# Patient Record
Sex: MALE | Race: WHITE | Employment: FULL TIME | URBAN - METROPOLITAN AREA
[De-identification: names, ages, dates, MRNs, and addresses within clinical notes are randomized per-mention and may not be internally consistent; named-entity substitution may affect disease eponyms.]

---

## 2017-12-12 ENCOUNTER — CLINICAL DOCUMENTATION (OUTPATIENT)
Dept: PHARMACY | Facility: CLINIC | Age: 52
End: 2017-12-12

## 2017-12-12 NOTE — PROGRESS NOTES
Pharmacy Pop Care Documentation:   Patient application received. Patient missing First provider visit for DM, First A1c result, Lipid panel drawn yearly and [facility] creation: code sent via email provided. Letter sent.

## 2017-12-12 NOTE — LETTER
[facility]   [address] [address]           12/12/17     Dear Home Corrales,    Thanks so much for taking the first step towards better health. This letter is to inform you that we have received your enrollment form for the [facility] DM Program but you are missing the following requirements or documentation:    Documentation of 2017 provider visit for DM; 2017 Results for A1C and Lipid Panel; MyChart account creation    To continue to qualify for this program the above requirements must be met by 12/15/17. Results or visits obtained outside of [facility] will need to be provided by fax or email to the numbers listed below before the deadline in order to qualify for the program.    If requirements are not met by the date listed above you will be disqualified from the program and the credit valued at $600 towards your diabetic medications and supplies will be revoked. You will be able to reapply the following calendar year. [facility] Team        [phone] Option #7    Email: [email or web address]    Fax Number: [phone]

## 2017-12-12 NOTE — LETTER
[place]   [address] [facility] [address]           01/08/18     Dear Pablo Ramachandran,    We regret to inform you that you have been disqualified from The [address] [place] [address] DM Program because the following requirements were not met by the stated deadline:    Documentation of 2017 MD Visit for DM and A1C and Lipid Panel drawn yearly. You will not receive the copay waiver up to $600 towards your diabetic medications and supplies in 2018. If you complete the missing requirements by 7/01/18 you can be enrolled for the remainder of the year and receive $300 in waived copays. Or you will be eligible to reapply to The [address] [place] [address] DM Program the following calendar year. [address] [place] [address] Team    [phone] Option #7    Email: [email or web address]    Fax Number: [phone]

## 2017-12-12 NOTE — PROGRESS NOTES
Pharmacy Pop Care Documentation:      The application for Caridad Stringer for enrollment into the diabetes management program has been reviewed and accepted on 12/12/17.     Nadia Markham

## 2017-12-15 NOTE — PROGRESS NOTES
Spoke with patient informed missing dm note, a1c, and lipid. Patient stated we will not get those today.  Informed him deadline is 12/15/17

## 2018-01-09 ENCOUNTER — TELEPHONE (OUTPATIENT)
Dept: PHARMACY | Facility: CLINIC | Age: 53
End: 2018-01-09

## 2018-03-14 ENCOUNTER — HOSPITAL ENCOUNTER (OUTPATIENT)
Age: 53
Discharge: HOME | End: 2018-03-14
Payer: COMMERCIAL

## 2018-03-14 LAB
C3 COMPLEMENT: 134 MG/DL (ref 90–180)
C4 COMPLEMENT: 23 MG/DL (ref 10–40)
HCT VFR BLD CALC: 44.7 % (ref 37–54)
HEMOGLOBIN: 15.9 G/DL (ref 12.5–16.5)
MCH RBC QN AUTO: 32.6 PG (ref 26–35)
MCHC RBC AUTO-ENTMCNC: 35.6 % (ref 32–34.5)
MCV RBC AUTO: 91.6 FL (ref 80–99.9)
PDW BLD-RTO: 12.6 FL (ref 11.5–15)
PLATELET # BLD: 252 E9/L (ref 130–450)
PMV BLD AUTO: 10.6 FL (ref 7–12)
RBC # BLD: 4.88 E12/L (ref 3.8–5.8)
TOTAL CK: 58 U/L (ref 20–200)
WBC # BLD: 7 E9/L (ref 4.5–11.5)

## 2018-03-14 PROCEDURE — 36415 COLL VENOUS BLD VENIPUNCTURE: CPT

## 2018-03-14 PROCEDURE — 86255 FLUORESCENT ANTIBODY SCREEN: CPT

## 2018-03-14 PROCEDURE — 85027 COMPLETE CBC AUTOMATED: CPT

## 2018-03-14 PROCEDURE — 86235 NUCLEAR ANTIGEN ANTIBODY: CPT

## 2018-03-14 PROCEDURE — 82550 ASSAY OF CK (CPK): CPT

## 2018-03-14 PROCEDURE — 86200 CCP ANTIBODY: CPT

## 2018-03-14 PROCEDURE — 86225 DNA ANTIBODY NATIVE: CPT

## 2018-03-14 PROCEDURE — 86160 COMPLEMENT ANTIGEN: CPT

## 2018-03-16 LAB
ANCA IFA: NORMAL
ANTI DNA DOUBLE STRANDED: NEGATIVE
CCP IGG ANTIBODIES: 3 UNITS (ref 0–19)
ENA TO SMITH (SM) ANTIBODY: NEGATIVE
ENA TO SSA (RO) ANTIBODY: NEGATIVE
ENA TO SSB (LA) ANTIBODY: NEGATIVE

## 2018-04-25 ENCOUNTER — PATIENT MESSAGE (OUTPATIENT)
Dept: PHARMACY | Facility: CLINIC | Age: 53
End: 2018-04-25

## 2019-03-13 PROBLEM — E55.9 VITAMIN D DEFICIENCY: Status: ACTIVE | Noted: 2019-03-13

## 2019-03-13 PROBLEM — J30.2 SEASONAL ALLERGIES: Status: ACTIVE | Noted: 2019-03-13

## 2019-03-13 PROBLEM — E66.812 CLASS 2 SEVERE OBESITY DUE TO EXCESS CALORIES WITH SERIOUS COMORBIDITY AND BODY MASS INDEX (BMI) OF 35.0 TO 35.9 IN ADULT: Status: ACTIVE | Noted: 2019-03-13

## 2019-03-13 PROBLEM — E11.69 DIABETES MELLITUS TYPE 2 IN OBESE (HCC): Status: ACTIVE | Noted: 2019-03-13

## 2019-03-13 PROBLEM — E66.9 DIABETES MELLITUS TYPE 2 IN OBESE (HCC): Status: ACTIVE | Noted: 2019-03-13

## 2019-03-13 PROBLEM — E66.01 CLASS 2 SEVERE OBESITY DUE TO EXCESS CALORIES WITH SERIOUS COMORBIDITY AND BODY MASS INDEX (BMI) OF 35.0 TO 35.9 IN ADULT (HCC): Status: ACTIVE | Noted: 2019-03-13

## 2019-06-20 ENCOUNTER — HOSPITAL ENCOUNTER (OUTPATIENT)
Age: 54
Discharge: HOME | End: 2019-06-20
Payer: COMMERCIAL

## 2019-06-20 LAB
ALBUMIN SERPL-MCNC: 4.3 G/DL (ref 3.5–5.2)
ALP BLD-CCNC: 49 U/L (ref 40–129)
ALT SERPL-CCNC: 20 U/L (ref 0–40)
ANION GAP SERPL CALCULATED.3IONS-SCNC: 12 MMOL/L (ref 7–16)
AST SERPL-CCNC: 22 U/L (ref 0–39)
BILIRUB SERPL-MCNC: 0.6 MG/DL (ref 0–1.2)
BUN BLDV-MCNC: 15 MG/DL (ref 6–20)
CALCIUM SERPL-MCNC: 9.5 MG/DL (ref 8.6–10.2)
CHLORIDE BLD-SCNC: 103 MMOL/L (ref 98–107)
CHOLESTEROL, TOTAL: 161 MG/DL (ref 0–199)
CO2: 27 MMOL/L (ref 22–29)
CREAT SERPL-MCNC: 0.7 MG/DL (ref 0.7–1.2)
GFR AFRICAN AMERICAN: >60
GFR NON-AFRICAN AMERICAN: >60 ML/MIN/1.73
GLUCOSE BLD-MCNC: 185 MG/DL (ref 74–99)
HBA1C MFR BLD: 8 % (ref 4–5.6)
HDLC SERPL-MCNC: 32 MG/DL
LDL CHOLESTEROL CALCULATED: 92 MG/DL (ref 0–99)
POTASSIUM SERPL-SCNC: 4.5 MMOL/L (ref 3.5–5)
SODIUM BLD-SCNC: 142 MMOL/L (ref 132–146)
TOTAL PROTEIN: 7.3 G/DL (ref 6.4–8.3)
TRIGL SERPL-MCNC: 183 MG/DL (ref 0–149)
VITAMIN D 25-HYDROXY: 51 NG/ML (ref 30–100)
VLDLC SERPL CALC-MCNC: 37 MG/DL

## 2019-06-20 PROCEDURE — 83036 HEMOGLOBIN GLYCOSYLATED A1C: CPT

## 2019-06-20 PROCEDURE — 82306 VITAMIN D 25 HYDROXY: CPT

## 2019-06-20 PROCEDURE — 80061 LIPID PANEL: CPT

## 2019-06-20 PROCEDURE — 36415 COLL VENOUS BLD VENIPUNCTURE: CPT

## 2019-06-20 PROCEDURE — 80053 COMPREHEN METABOLIC PANEL: CPT

## 2019-07-30 ENCOUNTER — PREP FOR PROCEDURE (OUTPATIENT)
Dept: GASTROENTEROLOGY | Age: 54
End: 2019-07-30

## 2019-07-30 RX ORDER — SODIUM CHLORIDE 9 MG/ML
INJECTION, SOLUTION INTRAVENOUS CONTINUOUS
Status: CANCELLED | OUTPATIENT
Start: 2019-07-30

## 2019-07-30 RX ORDER — SODIUM CHLORIDE 0.9 % (FLUSH) 0.9 %
10 SYRINGE (ML) INJECTION EVERY 12 HOURS SCHEDULED
Status: CANCELLED | OUTPATIENT
Start: 2019-07-30

## 2019-07-31 ENCOUNTER — ANESTHESIA EVENT (OUTPATIENT)
Dept: ENDOSCOPY | Age: 54
End: 2019-07-31
Payer: COMMERCIAL

## 2019-07-31 ENCOUNTER — ANESTHESIA (OUTPATIENT)
Dept: ENDOSCOPY | Age: 54
End: 2019-07-31
Payer: COMMERCIAL

## 2019-07-31 ENCOUNTER — HOSPITAL ENCOUNTER (OUTPATIENT)
Age: 54
Setting detail: OUTPATIENT SURGERY
Discharge: HOME | End: 2019-07-31
Attending: INTERNAL MEDICINE | Admitting: INTERNAL MEDICINE
Payer: COMMERCIAL

## 2019-07-31 VITALS
SYSTOLIC BLOOD PRESSURE: 131 MMHG | HEART RATE: 57 BPM | OXYGEN SATURATION: 96 % | DIASTOLIC BLOOD PRESSURE: 84 MMHG | TEMPERATURE: 97.6 F | BODY MASS INDEX: 33.2 KG/M2 | HEIGHT: 75 IN | RESPIRATION RATE: 20 BRPM | WEIGHT: 267 LBS

## 2019-07-31 VITALS — SYSTOLIC BLOOD PRESSURE: 110 MMHG | DIASTOLIC BLOOD PRESSURE: 74 MMHG | OXYGEN SATURATION: 91 %

## 2019-07-31 LAB — METER GLUCOSE: 136 MG/DL (ref 74–99)

## 2019-07-31 PROCEDURE — 2580000003 HC RX 258: Performed by: INTERNAL MEDICINE

## 2019-07-31 PROCEDURE — 2709999900 HC NON-CHARGEABLE SUPPLY: Performed by: INTERNAL MEDICINE

## 2019-07-31 PROCEDURE — 3700000001 HC ADD 15 MINUTES (ANESTHESIA): Performed by: INTERNAL MEDICINE

## 2019-07-31 PROCEDURE — 3609027000 HC COLONOSCOPY: Performed by: INTERNAL MEDICINE

## 2019-07-31 PROCEDURE — 6360000002 HC RX W HCPCS: Performed by: NURSE ANESTHETIST, CERTIFIED REGISTERED

## 2019-07-31 PROCEDURE — 7100000010 HC PHASE II RECOVERY - FIRST 15 MIN: Performed by: INTERNAL MEDICINE

## 2019-07-31 PROCEDURE — 82962 GLUCOSE BLOOD TEST: CPT

## 2019-07-31 PROCEDURE — 7100000011 HC PHASE II RECOVERY - ADDTL 15 MIN: Performed by: INTERNAL MEDICINE

## 2019-07-31 PROCEDURE — 3700000000 HC ANESTHESIA ATTENDED CARE: Performed by: INTERNAL MEDICINE

## 2019-07-31 RX ORDER — SODIUM CHLORIDE 0.9 % (FLUSH) 0.9 %
10 SYRINGE (ML) INJECTION PRN
Status: DISCONTINUED | OUTPATIENT
Start: 2019-07-31 | End: 2019-07-31 | Stop reason: HOSPADM

## 2019-07-31 RX ORDER — FENTANYL CITRATE 50 UG/ML
INJECTION, SOLUTION INTRAMUSCULAR; INTRAVENOUS PRN
Status: DISCONTINUED | OUTPATIENT
Start: 2019-07-31 | End: 2019-07-31 | Stop reason: SDUPTHER

## 2019-07-31 RX ORDER — SODIUM CHLORIDE 9 MG/ML
INJECTION, SOLUTION INTRAVENOUS CONTINUOUS
Status: DISCONTINUED | OUTPATIENT
Start: 2019-07-31 | End: 2019-07-31 | Stop reason: HOSPADM

## 2019-07-31 RX ORDER — SODIUM CHLORIDE 0.9 % (FLUSH) 0.9 %
10 SYRINGE (ML) INJECTION EVERY 12 HOURS SCHEDULED
Status: DISCONTINUED | OUTPATIENT
Start: 2019-07-31 | End: 2019-07-31 | Stop reason: HOSPADM

## 2019-07-31 RX ORDER — MIDAZOLAM HYDROCHLORIDE 1 MG/ML
INJECTION INTRAMUSCULAR; INTRAVENOUS PRN
Status: DISCONTINUED | OUTPATIENT
Start: 2019-07-31 | End: 2019-07-31 | Stop reason: SDUPTHER

## 2019-07-31 RX ADMIN — MIDAZOLAM HYDROCHLORIDE 2 MG: 1 INJECTION, SOLUTION INTRAMUSCULAR; INTRAVENOUS at 14:10

## 2019-07-31 RX ADMIN — SODIUM CHLORIDE: 9 INJECTION, SOLUTION INTRAVENOUS at 13:21

## 2019-07-31 RX ADMIN — SODIUM CHLORIDE: 9 INJECTION, SOLUTION INTRAVENOUS at 14:06

## 2019-07-31 RX ADMIN — FENTANYL CITRATE 50 MCG: 50 INJECTION, SOLUTION INTRAMUSCULAR; INTRAVENOUS at 14:11

## 2019-07-31 RX ADMIN — FENTANYL CITRATE 50 MCG: 50 INJECTION, SOLUTION INTRAMUSCULAR; INTRAVENOUS at 14:10

## 2019-07-31 ASSESSMENT — PAIN SCALES - GENERAL
PAINLEVEL_OUTOF10: 0

## 2019-07-31 ASSESSMENT — PAIN - FUNCTIONAL ASSESSMENT: PAIN_FUNCTIONAL_ASSESSMENT: 0-10

## 2019-07-31 NOTE — PROGRESS NOTES
Abdomen softly distended  With audible  Bowel sounds
products on the day of surgery    [x] If not already done, you can expect a call from registration    [x] You can expect a call the business day prior to procedure to notify you if your arrival time changes    [x] If you receive a survey after surgery we would greatly appreciate your comments    [] Parent/guardian of a minor must accompany their child and remain on the premises  the entire time they are under our care     [] Pediatric patients may bring favorite toy, blanket or comfort item with them    [] A caregiver or family member must remain with the patient during their stay if they are mentally handicapped, have dementia, disoriented or unable to use a call light or would be a safety concern if left unattended    [x] Please notify surgeon if you develop any illness between now and time of surgery (cold, cough, sore throat, fever, nausea, vomiting) or any signs of infections  including skin, wounds, and dental.    []  The Outpatient Pharmacy is available to fill your prescription here on your day of surgery, ask your preop nurse for details    [] Other instructions  EDUCATIONAL MATERIALS PROVIDED:    [] PAT Preoperative Education Packet/Booklet     [] Medication List    [] Fluoroscopy Information Pamphlet    [] Transfusion bracelet applied with instructions    [] Joint replacement video reviewed    [] Shower with soap, lather and rinse well, and use CHG wipes provided the evening before surgery as instructed

## 2019-07-31 NOTE — ANESTHESIA PRE PROCEDURE
Intravenous Continuous Elicia Gilmore MD 50 mL/hr at 07/31/19 1321      sodium chloride flush 0.9 % injection 10 mL  10 mL Intravenous PRN Elicia Gilmore MD         Facility-Administered Medications Ordered in Other Encounters   Medication Dose Route Frequency Provider Last Rate Last Dose    midazolam (VERSED) injection    PRN Bonita Mac APRN - CRNA   2 mg at 07/31/19 1410    fentaNYL (SUBLIMAZE) injection    PRN Bonita Mac APRN - CRNA   50 mcg at 07/31/19 1410       Allergies:  No Known Allergies    Problem List:    Patient Active Problem List   Diagnosis Code    Chest pain R07.9    Hyperlipemia E78.5    Hypertension I10    Diabetes mellitus type 2 in obese ([facility] E11.69, E66.9    Vitamin D deficiency E55.9    Class 2 severe obesity due to excess calories with serious comorbidity and body mass index (BMI) of 35.0 to 35.9 in adult ([facility]) E66.01, Z68.35    Seasonal allergies J30.2       Past Medical History:        Diagnosis Date    Diabetes mellitus ([facility]     Encounter for screening colonoscopy     Hyperlipemia     Hypertension        Past Surgical History:        Procedure Laterality Date    SINUS SURGERY      TONSILLECTOMY      22yrs ago    UPPER GASTROINTESTINAL ENDOSCOPY  09/20/12    ESOPHAGOGASTRODUODENOSCOPY       Social History:    Social History     Tobacco Use    Smoking status: Never Smoker    Smokeless tobacco: Never Used   Substance Use Topics    Alcohol use: Yes     Comment: Once per week                                Counseling given: Not Answered      Vital Signs (Current):   Vitals:    07/24/19 1422 07/31/19 1303   BP:  (!) 149/83   Pulse:  64   Resp:  18   Temp:  96.9 °F (36.1 °C)   TempSrc:  Temporal   SpO2:  98%   Weight: 275 lb (124.7 kg) 267 lb (121.1 kg)   Height: 6' 3\" (1.905 m) 6' 3\" (1.905 m)                                              BP Readings from Last 3 Encounters:   07/31/19 (!) 149/83   06/21/19 (!) 152/90   03/13/19 130/80       NPO Status: Time of last liquid consumption: 0500(finishing prep)                        Time of last solid consumption: 1800                        Date of last liquid consumption: 07/31/19                        Date of last solid food consumption: 07/29/19    BMI:   Wt Readings from Last 3 Encounters:   07/31/19 267 lb (121.1 kg)   06/21/19 278 lb (126.1 kg)   03/13/19 275 lb (124.7 kg)     Body mass index is 33.37 kg/m². CBC:   Lab Results   Component Value Date    WBC 7.0 03/14/2018    RBC 4.88 03/14/2018    HGB 15.9 03/14/2018    HCT 44.7 03/14/2018    MCV 91.6 03/14/2018    RDW 12.6 03/14/2018     03/14/2018       CMP:   Lab Results   Component Value Date     06/20/2019    K 4.5 06/20/2019     06/20/2019    CO2 27 06/20/2019    BUN 15 06/20/2019    CREATININE 0.7 06/20/2019    GFRAA >60 06/20/2019    LABGLOM >60 06/20/2019    GLUCOSE 185 06/20/2019    GLUCOSE 147 05/15/2012    PROT 7.3 06/20/2019    CALCIUM 9.5 06/20/2019    BILITOT 0.6 06/20/2019    ALKPHOS 49 06/20/2019    AST 22 06/20/2019    ALT 20 06/20/2019       POC Tests: No results for input(s): POCGLU, POCNA, POCK, POCCL, POCBUN, POCHEMO, POCHCT in the last 72 hours.     Coags: No results found for: PROTIME, INR, APTT    HCG (If Applicable): No results found for: PREGTESTUR, PREGSERUM, HCG, HCGQUANT     ABGs: No results found for: PHART, PO2ART, JYF7TFC, KDY9LWC, BEART, E0KKTLMH     Type & Screen (If Applicable):  No results found for: LABABO, LABRH    Anesthesia Evaluation   no history of anesthetic complications:   Airway: Mallampati: III  TM distance: >3 FB   Neck ROM: full  Mouth opening: > = 3 FB Dental:          Pulmonary:Negative Pulmonary ROS breath sounds clear to auscultation                             Cardiovascular:  Exercise tolerance: good (>4 METS),   (+) hypertension:, hyperlipidemia        Rhythm: regular  Rate: normal                    Neuro/Psych:   Negative Neuro/Psych ROS              GI/Hepatic/Renal: Neg GI/Hepatic/Renal ROS            Endo/Other:    (+) DiabetesType II DM, , .                  ROS comment: Obesity Abdominal:           Vascular: negative vascular ROS. Anesthesia Plan      MAC     ASA 3       Induction: intravenous. Anesthetic plan and risks discussed with patient.                       Foster Garcia MD   7/31/2019

## 2019-09-30 ENCOUNTER — HOSPITAL ENCOUNTER (OUTPATIENT)
Age: 54
Discharge: HOME | End: 2019-09-30
Payer: COMMERCIAL

## 2019-09-30 DIAGNOSIS — E11.69 DIABETES MELLITUS ASSOCIATED WITH HORMONAL ETIOLOGY (HCC): ICD-10-CM

## 2019-09-30 DIAGNOSIS — E55.9 AVITAMINOSIS D: ICD-10-CM

## 2019-09-30 DIAGNOSIS — E66.9 DIABETES MELLITUS TYPE 2 IN OBESE (HCC): ICD-10-CM

## 2019-09-30 DIAGNOSIS — E11.69 DIABETES MELLITUS TYPE 2 IN OBESE (HCC): ICD-10-CM

## 2019-09-30 LAB
ALBUMIN SERPL-MCNC: 4.7 G/DL (ref 3.5–5.2)
ALP BLD-CCNC: 52 U/L (ref 40–129)
ALT SERPL-CCNC: 20 U/L (ref 0–40)
ANION GAP SERPL CALCULATED.3IONS-SCNC: 14 MMOL/L (ref 7–16)
AST SERPL-CCNC: 21 U/L (ref 0–39)
BILIRUB SERPL-MCNC: 0.6 MG/DL (ref 0–1.2)
BUN BLDV-MCNC: 16 MG/DL (ref 6–20)
CALCIUM SERPL-MCNC: 10.3 MG/DL (ref 8.6–10.2)
CHLORIDE BLD-SCNC: 102 MMOL/L (ref 98–107)
CHOLESTEROL, TOTAL: 138 MG/DL (ref 0–199)
CO2: 29 MMOL/L (ref 22–29)
CREAT SERPL-MCNC: 0.8 MG/DL (ref 0.7–1.2)
GFR AFRICAN AMERICAN: >60
GFR NON-AFRICAN AMERICAN: >60 ML/MIN/1.73
GLUCOSE BLD-MCNC: 164 MG/DL (ref 74–99)
HBA1C MFR BLD: 7.2 % (ref 4–5.6)
HDLC SERPL-MCNC: 36 MG/DL
LDL CHOLESTEROL CALCULATED: 71 MG/DL (ref 0–99)
POTASSIUM SERPL-SCNC: 4.6 MMOL/L (ref 3.5–5)
SODIUM BLD-SCNC: 145 MMOL/L (ref 132–146)
TOTAL PROTEIN: 7.5 G/DL (ref 6.4–8.3)
TRIGL SERPL-MCNC: 156 MG/DL (ref 0–149)
VITAMIN D 25-HYDROXY: 50 NG/ML (ref 30–100)
VLDLC SERPL CALC-MCNC: 31 MG/DL

## 2019-09-30 PROCEDURE — 36415 COLL VENOUS BLD VENIPUNCTURE: CPT

## 2019-09-30 PROCEDURE — 80053 COMPREHEN METABOLIC PANEL: CPT

## 2019-09-30 PROCEDURE — 83036 HEMOGLOBIN GLYCOSYLATED A1C: CPT

## 2019-09-30 PROCEDURE — 80061 LIPID PANEL: CPT

## 2019-09-30 PROCEDURE — 82306 VITAMIN D 25 HYDROXY: CPT

## 2019-10-01 PROBLEM — E66.811 CLASS 1 OBESITY DUE TO EXCESS CALORIES WITH SERIOUS COMORBIDITY AND BODY MASS INDEX (BMI) OF 34.0 TO 34.9 IN ADULT: Status: ACTIVE | Noted: 2019-03-13

## 2019-10-01 PROBLEM — E66.09 CLASS 1 OBESITY DUE TO EXCESS CALORIES WITH SERIOUS COMORBIDITY AND BODY MASS INDEX (BMI) OF 34.0 TO 34.9 IN ADULT: Status: ACTIVE | Noted: 2019-03-13

## 2019-12-18 ENCOUNTER — TELEPHONE (OUTPATIENT)
Dept: PHARMACY | Facility: CLINIC | Age: 54
End: 2019-12-18

## 2020-05-11 ENCOUNTER — HOSPITAL ENCOUNTER (OUTPATIENT)
Age: 55
Discharge: HOME | End: 2020-05-13
Payer: COMMERCIAL

## 2020-05-11 ENCOUNTER — OFFICE VISIT (OUTPATIENT)
Dept: PRIMARY CARE CLINIC | Age: 55
End: 2020-05-11
Payer: COMMERCIAL

## 2020-05-11 VITALS
HEART RATE: 91 BPM | TEMPERATURE: 98.9 F | OXYGEN SATURATION: 99 % | HEIGHT: 75 IN | RESPIRATION RATE: 16 BRPM | SYSTOLIC BLOOD PRESSURE: 160 MMHG | WEIGHT: 275 LBS | BODY MASS INDEX: 34.19 KG/M2 | DIASTOLIC BLOOD PRESSURE: 90 MMHG

## 2020-05-11 PROCEDURE — 99213 OFFICE O/P EST LOW 20 MIN: CPT | Performed by: FAMILY MEDICINE

## 2020-05-11 PROCEDURE — U0003 INFECTIOUS AGENT DETECTION BY NUCLEIC ACID (DNA OR RNA); SEVERE ACUTE RESPIRATORY SYNDROME CORONAVIRUS 2 (SARS-COV-2) (CORONAVIRUS DISEASE [COVID-19]), AMPLIFIED PROBE TECHNIQUE, MAKING USE OF HIGH THROUGHPUT TECHNOLOGIES AS DESCRIBED BY CMS-2020-01-R: HCPCS

## 2020-05-11 NOTE — LETTER
[address]  Phone: [phone]  Fax: [phone] [facility], DO        May 11, 2020     Patient: Valerie Rayo. YOB: 1965   Date of Visit: 5/11/2020       To Whom It May Concern: It is my medical opinion that Rj Query should remain out of work pending COVID testing. .    If you have any questions or concerns, please don't hesitate to call.     Sincerely,        Jesús Panchal, DO

## 2020-05-12 LAB
SARS-COV-2: NOT DETECTED
SOURCE: NORMAL

## 2020-05-14 ENCOUNTER — TELEPHONE (OUTPATIENT)
Dept: ADMINISTRATIVE | Age: 55
End: 2020-05-14

## 2020-06-17 ENCOUNTER — HOSPITAL ENCOUNTER (OUTPATIENT)
Age: 55
Discharge: HOME | End: 2020-06-17
Payer: COMMERCIAL

## 2020-06-17 LAB
ALBUMIN SERPL-MCNC: 4.2 G/DL (ref 3.5–5.2)
ALP BLD-CCNC: 49 U/L (ref 40–129)
ALT SERPL-CCNC: 21 U/L (ref 0–40)
ANION GAP SERPL CALCULATED.3IONS-SCNC: 12 MMOL/L (ref 7–16)
AST SERPL-CCNC: 23 U/L (ref 0–39)
BILIRUB SERPL-MCNC: 0.7 MG/DL (ref 0–1.2)
BUN BLDV-MCNC: 12 MG/DL (ref 6–20)
CALCIUM SERPL-MCNC: 9.7 MG/DL (ref 8.6–10.2)
CHLORIDE BLD-SCNC: 102 MMOL/L (ref 98–107)
CHOLESTEROL, TOTAL: 133 MG/DL (ref 0–199)
CO2: 27 MMOL/L (ref 22–29)
CREAT SERPL-MCNC: 0.7 MG/DL (ref 0.7–1.2)
GFR AFRICAN AMERICAN: >60
GFR NON-AFRICAN AMERICAN: >60 ML/MIN/1.73
GLUCOSE BLD-MCNC: 156 MG/DL (ref 74–99)
HBA1C MFR BLD: 8.4 % (ref 4–5.6)
HDLC SERPL-MCNC: 32 MG/DL
LDL CHOLESTEROL CALCULATED: 63 MG/DL (ref 0–99)
POTASSIUM SERPL-SCNC: 4.4 MMOL/L (ref 3.5–5)
SODIUM BLD-SCNC: 141 MMOL/L (ref 132–146)
TOTAL PROTEIN: 7.1 G/DL (ref 6.4–8.3)
TRIGL SERPL-MCNC: 188 MG/DL (ref 0–149)
VITAMIN D 25-HYDROXY: 43 NG/ML (ref 30–100)
VLDLC SERPL CALC-MCNC: 38 MG/DL

## 2020-06-17 PROCEDURE — 80053 COMPREHEN METABOLIC PANEL: CPT

## 2020-06-17 PROCEDURE — 82306 VITAMIN D 25 HYDROXY: CPT

## 2020-06-17 PROCEDURE — 36415 COLL VENOUS BLD VENIPUNCTURE: CPT

## 2020-06-17 PROCEDURE — 83036 HEMOGLOBIN GLYCOSYLATED A1C: CPT

## 2020-06-17 PROCEDURE — 80061 LIPID PANEL: CPT

## 2020-10-01 ENCOUNTER — HOSPITAL ENCOUNTER (OUTPATIENT)
Age: 55
Discharge: HOME | End: 2020-10-01
Payer: COMMERCIAL

## 2020-10-01 LAB
CHOLESTEROL, TOTAL: 143 MG/DL (ref 0–199)
HBA1C MFR BLD: 8.1 % (ref 4–5.6)
HDLC SERPL-MCNC: 40 MG/DL
LDL CHOLESTEROL CALCULATED: 78 MG/DL (ref 0–99)
TRIGL SERPL-MCNC: 123 MG/DL (ref 0–149)
VLDLC SERPL CALC-MCNC: 25 MG/DL

## 2020-10-01 PROCEDURE — 83036 HEMOGLOBIN GLYCOSYLATED A1C: CPT

## 2020-10-01 PROCEDURE — 36415 COLL VENOUS BLD VENIPUNCTURE: CPT

## 2020-10-01 PROCEDURE — 80061 LIPID PANEL: CPT

## 2020-11-30 ENCOUNTER — OFFICE VISIT (OUTPATIENT)
Dept: PRIMARY CARE CLINIC | Age: 55
End: 2020-11-30
Payer: COMMERCIAL

## 2020-11-30 VITALS
OXYGEN SATURATION: 98 % | DIASTOLIC BLOOD PRESSURE: 88 MMHG | HEIGHT: 75 IN | WEIGHT: 265 LBS | BODY MASS INDEX: 32.95 KG/M2 | HEART RATE: 70 BPM | TEMPERATURE: 99.3 F | SYSTOLIC BLOOD PRESSURE: 142 MMHG

## 2020-11-30 LAB
Lab: NORMAL
QC PASS/FAIL: NORMAL
SARS-COV-2, POC: DETECTED

## 2020-11-30 PROCEDURE — 87426 SARSCOV CORONAVIRUS AG IA: CPT | Performed by: PHYSICIAN ASSISTANT

## 2020-11-30 PROCEDURE — 99213 OFFICE O/P EST LOW 20 MIN: CPT | Performed by: PHYSICIAN ASSISTANT

## 2020-11-30 RX ORDER — METHYLPREDNISOLONE 4 MG/1
TABLET ORAL
Qty: 1 KIT | Refills: 0 | Status: SHIPPED | OUTPATIENT
Start: 2020-11-30 | End: 2020-12-06

## 2020-11-30 RX ORDER — AZITHROMYCIN 250 MG/1
TABLET, FILM COATED ORAL
Qty: 6 TABLET | Refills: 0 | Status: SHIPPED
Start: 2020-11-30 | End: 2021-01-08

## 2020-11-30 NOTE — PROGRESS NOTES
Chief Complaint   Nausea; Diarrhea; Cough (nonproductive, no known exposure); Chills (nothing OTC); and Pharyngitis      History of Present Illness   Source of history provided by: patientMegan Ortiz is a 50 y.o. old male who has a past medical history of:   Past Medical History:   Diagnosis Date    Diabetes mellitus ([facility]     Encounter for screening colonoscopy     Hyperlipemia     Hypertension    Presents to the flu clinic for evaluation of nausea, diarrhea, nonproductive cough, chills, and sore throat x4 days. Patient denies any known exposure to COVID-19. Patient has not been taking anything over-the-counter for symptomatic relief. Denies any fever, loss of taste or smell, CP, dyspnea, LE edema, abdominal pain, vomiting, rash, or lethargy. Denies any hx of asthma, COPD, or tobacco use. Past medical history significant for hypertension and diabetes. ROS   Pertinent positives and negatives are stated within HPI, all other systems reviewed and are negative. Surgical History:  has a past surgical history that includes Tonsillectomy; Upper gastrointestinal endoscopy (09/20/12); sinus surgery; and Colonoscopy (N/A, 7/31/2019). Social History:  reports that he has never smoked. He has never used smokeless tobacco. He reports current alcohol use. He reports that he does not use drugs. Family History: family history includes Alzheimer's Disease in his mother; Anxiety Disorder in his sister; No Known Problems in his father. Allergies: Patient has no known allergies. Physical Exam      VS:  BP (!) 142/88   Pulse 70   Temp 99.3 °F (37.4 °C)   Ht 6' 3\" (1.905 m)   Wt 265 lb (120.2 kg)   SpO2 98%   BMI 33.12 kg/m²    Oxygen Saturation Interpretation: Normal.    Constitutional:  Alert, development consistent with age. NAD. Head:  NC/NT. Airway patent. Mouth: Posterior pharynx with mild erythema and clear postnasal drip. No tonsillar hypertrophy or exudate. Neck:  Normal ROM. Supple. No anterior cervical adenopathy noted. Lungs: CTAB without wheezes, rales, or rhonchi. CV:  Regular rate and rhythm, normal heart sounds, without pathological murmurs, ectopy, gallops, or rubs. Skin:  Normal turgor. Warm, dry, without visible rash. Lymphatic: No lymphangitis or adenopathy noted. Neurological:  Oriented. Motor functions intact. Lab / Imaging Results   (All laboratory and radiology results have been personally reviewed by myself)  Labs:  Results for orders placed or performed in visit on 11/30/20   POCT COVID-19, Antigen   Result Value Ref Range    SARS-COV-2, POC Detected Not Detected    Lot Number [id number]     QC Pass/Fail pass        Imaging: All Radiology results interpreted by Radiologist unless otherwise noted. No results found. Medical Decision Making   Pt non-toxic, in no apparent distress and stable at time of discharge. Assessment/Plan   Mari Welch was seen today for nausea, diarrhea, cough, chills and pharyngitis. Diagnoses and all orders for this visit:    COVID-19  -     POCT COVID-19, Antigen  -     azithromycin (ZITHROMAX Z-RAMSES) 250 MG tablet; Take 2 tabs on day one, then 1 tab daily for the next 4 days  -     methylPREDNISolone (MEDROL DOSEPACK) 4 MG tablet; Take by mouth. Rapid COVID-19 testing is positive in office. Advised strict 10-day quarantine from start of illness. Prescriptions written for Zithromax and Medrol Dosepak, side effects discussed. Patient advised to closely monitor blood glucose readings at home. ER immediately with any readings greater than 350 or signs/symptoms of hyperglycemia. COVID-19 swab obtained and pending, will call with results once available. Pt should remain out of work for at least 10 days from the start of symptoms. Pt should also be fever free for 24 hours and symptoms should be improved overall prior to returning. Increase fluids and rest. Symptomatic relief discussed including Tylenol prn pain/fever.  Schedule virtual f/u with PCP in 7-10 days if symptoms persist. ED sooner if symptoms worsen or change. ED immediately with high or refractory fever, progressive SOB, dyspnea, CP, calf pain/swelling, shaking chills, vomiting, abdominal pain, lethargy, flank pain, or decreased urinary output. Pt verbalizes understanding and is in agreement with plan of care. All questions answered. Rhona Gonzales PA-C    This visit was provided as a focused evaluation during the COVID -19 pandemic/national emergency. A comprehensive review of all previous patient history and testing was not conducted. Pertinent findings were elicited during the visit.

## 2020-11-30 NOTE — LETTER
[address] [place] [address]  [place], [address]   Phone: [phone]  Fax: [phone]    Evonnie Koyanagi. Jimmy Ceja PA-C      11/30/2020     Patient: Anup Christianson YOB: 1965       To Whom It May Concern: It is my medical opinion that Anup Christianson should remain out of work while acutely ill as he tested positive for COVID-19. Return to work with no retesting should be followed if test is negative AND meets these 3 criteria as outlined by CDC/ODH:     a. No fever without the use of fever reducers for 24 hours  b. Improvement in symptoms  c. At least 10 days since the onset of symptoms (12/7). If you have any questions or concerns, please don't hesitate to call. Sincerely,        Evonnie Koyanagi.  WILBER Gonzales

## 2021-01-08 ENCOUNTER — OFFICE VISIT (OUTPATIENT)
Dept: ORTHOPEDIC SURGERY | Age: 56
End: 2021-01-08
Payer: COMMERCIAL

## 2021-01-08 VITALS — TEMPERATURE: 97.4 F | WEIGHT: 260 LBS | BODY MASS INDEX: 32.33 KG/M2 | HEIGHT: 75 IN

## 2021-01-08 DIAGNOSIS — M25.511 ACUTE PAIN OF RIGHT SHOULDER: Primary | ICD-10-CM

## 2021-01-08 PROCEDURE — 99203 OFFICE O/P NEW LOW 30 MIN: CPT | Performed by: NURSE PRACTITIONER

## 2021-01-08 RX ORDER — GLIPIZIDE 5 MG/1
TABLET ORAL
COMMUNITY
End: 2021-03-11 | Stop reason: SDUPTHER

## 2021-01-08 RX ORDER — SIMVASTATIN 5 MG
TABLET ORAL
COMMUNITY
End: 2021-01-18 | Stop reason: SDUPTHER

## 2021-01-08 NOTE — PROGRESS NOTES
SOCIAL HISTORY  Social History     Occupational History    Occupation: active worker     Comment: [employer]   Tobacco Use    Smoking status: Never Smoker    Smokeless tobacco: Never Used    Tobacco comment: social   Substance and Sexual Activity    Alcohol use: Yes     Comment: Once per week    Drug use: No    Sexual activity: Yes       CURRENT MEDICATIONS     Current Outpatient Medications:     glipiZIDE (GLUCOTROL) 5 MG tablet, , Disp: , Rfl:     simvastatin (ZOCOR) 5 MG tablet, , Disp: , Rfl:     metFORMIN (GLUCOPHAGE-XR) 500 MG extended release tablet, Take 2 tablets by mouth 2 times daily, Disp: 360 tablet, Rfl: 1    montelukast (SINGULAIR) 10 MG tablet, TAKE ONE TABLET BY MOUTH NIGHTLY, Disp: 30 tablet, Rfl: 5    dapagliflozin (FARXIGA) 10 MG tablet, Take 1 tablet by mouth every morning, Disp: 30 tablet, Rfl: 5    losartan-hydrochlorothiazide (HYZAAR) 100-25 MG per tablet, Take 1 tablet by mouth daily, Disp: 30 tablet, Rfl: 5    blood glucose test strips (ASCENSIA AUTODISC VI;ONE TOUCH ULTRA TEST VI) strip, 1 each by In Vitro route 2 times daily As needed. , Disp: 200 each, Rfl: 1    Lancets Ultra Fine MISC, 1 each by Does not apply route 2 times daily, Disp: 200 each, Rfl: 1    Cholecalciferol (VITAMIN D3) 5000 UNITS TABS, Take by mouth daily LD 7/25/19, Disp: , Rfl:     KRILL OIL PO, Take by mouth daily LD 7/25/19, Disp: , Rfl:     aspirin 81 MG tablet, Take 81 mg by mouth daily LD 7/25/19 (takes for healthy living), Disp: , Rfl:     Multiple Vitamins-Minerals (CENTRUM PO), Take 1 tablet by mouth daily LD 7/25/19, Disp: , Rfl:     ALLERGIES  No Known Allergies    Controlled Substances Monitoring:        REVIEW OF SYSTEMS:     General: Negative Fever, chills, fatigue   Cardiovascular: Negative chest pain, palpitations  Respiratory: Equal chest expansion, negative shortness of breath   Skin: Negative rash, open wounds  Psych: Normal affect, mood stable Neurologic: sensation grossly intact in extremities   Musculoskeletal: See HPI      PHYSICAL EXAM     Vitals:    01/08/21 0951 01/08/21 0954   Temp: 97.4 °F (36.3 °C)    TempSrc: Infrared    Weight:  260 lb (117.9 kg)   Height:  6' 3\" (1.905 m)       Height: 6' 3\" (1.905 m)  Weight: [unfilled]  BMI:  Body mass index is 32.5 kg/m². General: The patient is alert and oriented x 3, appears to be stated age and in no distress. HEENT: head is normocephalic, atraumatic. EOMI. Neck: supple, trachea midline, no thyromegaly   Cardiovascular: peripheral pulses palpable. Normal Capillary refill   Respiratory: breathing unlabored, chest expansion symmetric   Skin: no rash, no open wounds, no erythema  Psych: normal affect; mood stable  Neurologic: gait normal, sensation grossly intact in extremities  MSK:    Cervical Spine: There is no tenderness to palpation along the cervical spine. Range of motion is normal.  Spurling's is negative    Shoulder Exam:   Right shoulder exam range of motion 170/60/T10. Mild pain and weakness present with Jobes, and O'Briens exams. Mild pain present with speeds exam.  Intact belly press exam.  Pain is present with internal and external rotation. There is some old laxity shift in the shoulder joint with palpable pop with range of motion. IMAGING:     XRAY:  3 views of the right shoulder show maintained glenohumeral joint space mild degenerative changes to the [facility] joint. Normal shoulder x-ray negative for acute process. Pression: Normal shoulder x-ray negative for acute process.     Radiographic findings reviewed with patient    ASSESSMENT   Right shoulder pain      PLAN Today we discussed his right shoulder. Patient reported onset of symptoms 3 months ago without known injury. 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 On physical exam there was moderate crepitus with a palpable pop and mild laxity present. He had some positive findings for rotator cuff strength testing. We discussed obtaining an MRI of his right shoulder to further evaluate as imaging today was unremarkable. Patient was agreeable to plan of care. MRI without contrast was ordered today patient will call the office to schedule follow-up appointment after MRI is obtained. We will see him back to discuss MRI findings.         Terri Green CNP  Orthopaedic Surgery   1/8/21  12:15 PM

## 2021-01-20 ENCOUNTER — HOSPITAL ENCOUNTER (OUTPATIENT)
Dept: GENERAL RADIOLOGY | Age: 56
Discharge: HOME | End: 2021-01-22
Payer: COMMERCIAL

## 2021-01-20 ENCOUNTER — HOSPITAL ENCOUNTER (OUTPATIENT)
Age: 56
Discharge: HOME | End: 2021-01-22
Payer: COMMERCIAL

## 2021-01-20 ENCOUNTER — HOSPITAL ENCOUNTER (OUTPATIENT)
Dept: MRI IMAGING | Age: 56
Discharge: HOME | End: 2021-01-22
Payer: COMMERCIAL

## 2021-01-20 DIAGNOSIS — M79.5 FOREIGN BODY (FB) IN SOFT TISSUE: ICD-10-CM

## 2021-01-20 DIAGNOSIS — M25.511 ACUTE PAIN OF RIGHT SHOULDER: ICD-10-CM

## 2021-01-20 PROCEDURE — 73221 MRI JOINT UPR EXTREM W/O DYE: CPT

## 2021-01-20 PROCEDURE — 70030 X-RAY EYE FOR FOREIGN BODY: CPT

## 2021-01-25 ENCOUNTER — OFFICE VISIT (OUTPATIENT)
Dept: ORTHOPEDIC SURGERY | Age: 56
End: 2021-01-25
Payer: COMMERCIAL

## 2021-01-25 VITALS — HEIGHT: 75 IN | BODY MASS INDEX: 32.33 KG/M2 | TEMPERATURE: 97.3 F | WEIGHT: 260 LBS

## 2021-01-25 DIAGNOSIS — M25.511 ACUTE PAIN OF RIGHT SHOULDER: Primary | ICD-10-CM

## 2021-01-25 PROCEDURE — 20610 DRAIN/INJ JOINT/BURSA W/O US: CPT | Performed by: ORTHOPAEDIC SURGERY

## 2021-01-25 PROCEDURE — 99213 OFFICE O/P EST LOW 20 MIN: CPT | Performed by: ORTHOPAEDIC SURGERY

## 2021-01-25 RX ORDER — TRIAMCINOLONE ACETONIDE 40 MG/ML
40 INJECTION, SUSPENSION INTRA-ARTICULAR; INTRAMUSCULAR ONCE
Status: COMPLETED | OUTPATIENT
Start: 2021-01-25 | End: 2021-01-25

## 2021-01-25 RX ORDER — LIDOCAINE HYDROCHLORIDE 10 MG/ML
4 INJECTION, SOLUTION INFILTRATION; PERINEURAL ONCE
Status: COMPLETED | OUTPATIENT
Start: 2021-01-25 | End: 2021-01-25

## 2021-01-25 RX ADMIN — LIDOCAINE HYDROCHLORIDE 4 ML: 10 INJECTION, SOLUTION INFILTRATION; PERINEURAL at 15:30

## 2021-01-25 RX ADMIN — TRIAMCINOLONE ACETONIDE 40 MG: 40 INJECTION, SUSPENSION INTRA-ARTICULAR; INTRAMUSCULAR at 15:31

## 2021-01-25 NOTE — PROGRESS NOTES
Follow Up Visit     Jojo Pinto returns today for follow up visit regarding right shoulder pain. Treatment thus far has included getting an MRI to further assess his symptoms. He reports symptoms are unchanged. Physical Exam:     Height: 6' 3\" (1.905 m), Weight: 260 lb (117.9 kg)    General: Alert and oriented x3, no acute distress  Cardiovascular/pulmonary: No labored breathing, peripheral perfusion intact  Musculoskeletal:    Right shoulder exam range of motion 170/65/T10. Jobes and O'Briens exams were positive for pain without weakness. Speeds exam was intact. Mild pain present with Yergason's exam.  Jodeen Rob exam was positive for pain without weakness. No swelling or deformity visualized on inspection. Periscapular pain positive to palpation. Controlled Substances Monitoring:      Imaging:  No new imaging obtained today. Previous imaging was reviewed    RI of right shoulder was reviewed today which showed probable small partial-thickness subscapularis tear, supraspinatus appears to be intact. Procedure Note:  Right Shoulder steroid injection     The Right shoulder was identified as the injection site. The risk and benefits of a cortisone injection were explained and the patient consented to the injection. Under sterile conditions, the subacromial space was injected from posterior approach with a mixture of 40 mg of Kenalog, 4 cc  1% Lidocaine without complication. A sterile bandage was applied.     Administrations This Visit     lidocaine 1 % injection 4 mL     Admin Date  01/25/2021 Action  Given Dose  4 mL Route  Intra-articular Administered By  Lorena Han RN          triamcinolone acetonide VIA [facility]) injection 40 mg     Admin Date  01/25/2021 Action  Given Dose  40 mg Route  Intra-articular Administered By  Lorena Han RN                  Assessment: Right shoulder partial rotator cuff tear      Plan: Today we discussed his right shoulder. Patient reports that symptoms remain unchanged and continues to have sharp pains with movement. Patient denied weakness or loss of range of motion. On physical exam rotator cuff strengthening was intact. We discussed conservative treatment today. Patient was offered a cortisone injection for symptom management. This was agreeable and performed today. Patient will follow-up in 3 months. Patient verbalized understanding. Alec Jorge [address] [facility]  Orthopedic Surgery   01/25/21  3:32 PM    Staff Addendum    I have seen and evaluated the patient and agree with the assessment and plan as documented by Alec Jorge CNP. I have performed the key components of the history and physical examination and concur with the findings and plan, and have made changes where appropriate/necessary. Agree with above. Most of his pain is posterior and periscapular. Shoulder provocative exams are fairly benign today. MRI reviewed showing some probable partial tearing of subscapularis which does not seem to be symptomatic today. We did offer him a subacromial injection he was agreeable. He will try a course of physical therapy.   We will check him back in about 2 months to reassess      Yisel Hicks MD  [address]

## 2021-01-25 NOTE — PROGRESS NOTES
Assisted Dr. Hiram Mendoza with injection of 4 cc lidocaine/1 cc kenalog in R shoulder. Patient tolerated procedure well. Verbal instructions were given.

## 2021-01-29 ENCOUNTER — HOSPITAL ENCOUNTER (OUTPATIENT)
Dept: PHYSICAL THERAPY | Age: 56
Setting detail: THERAPIES SERIES
Discharge: HOME | End: 2021-01-29
Payer: COMMERCIAL

## 2021-01-29 PROCEDURE — 97161 PT EVAL LOW COMPLEX 20 MIN: CPT | Performed by: PHYSICAL THERAPIST

## 2021-01-29 ASSESSMENT — PAIN DESCRIPTION - FREQUENCY: FREQUENCY: INTERMITTENT

## 2021-01-29 ASSESSMENT — PAIN SCALES - GENERAL: PAINLEVEL_OUTOF10: 4

## 2021-01-29 ASSESSMENT — PAIN DESCRIPTION - DESCRIPTORS: DESCRIPTORS: ACHING

## 2021-01-29 ASSESSMENT — PAIN DESCRIPTION - ORIENTATION: ORIENTATION: RIGHT

## 2021-01-29 NOTE — PROGRESS NOTES
[address] [facility]                Phone: [phone]   Fax: [phone]    Physical Therapy Daily Treatment Note  Date:  2021    Patient Name:  Arthur Velasquez. :  1965  MRN: [id number]    Evaluating therapist:  ARTURO Balderrama             (21)  Restrictions/Precautions:    Diagnosis:  R shoulder sp/st  Treatment Diagnosis:    Insurance/Certification information:  [employer]  Referring Physician:  Darian Frias of care signed (Y/N):    Visit# / total visits:  1/10  Pain level: 4/10   Time In:  Time Out:    Subjective:      Exercises:  Exercise/Equipment Resistance/Repetitions Other comments   UBE              shrugs     scap ret            H/M pulls      rows     IR/ER     hor abd                                                                                Other Therapeutic Activities:      Home Exercise Program:      Manual Treatments:      Modalities:  IFC/MH to R shoulder     Timed Code Treatment Minutes: Total Treatment Minutes:      Treatment/Activity Tolerance:  [] Patient tolerated treatment well [] Patient limited by fatique  [] Patient limited by pain  [] Patient limited by other medical complications  [] Other:     Prognosis: [] Good [] Fair  [] Poor    Patient Requires Follow-up: [] Yes  [] No    Plan:   [] Continue per plan of care [] Alter current plan (see comments)  [] Plan of care initiated [] Hold pending MD visit [] Discharge  Plan for Next Session:      See Weekly Progress Note: []  Yes  []  No  Next due:        Electronically signed by:   Eduardo Alexander

## 2021-01-29 NOTE — PROGRESS NOTES
Physical Therapy  Initial Assessment  Date: 2021  Patient Name: David Rodriguez   MRN: [id number]  : 1965           Subjective   General  Chart Reviewed: Yes  Referring Practitioner: Julia Alberto.  Referral Date : 21  Diagnosis: sp/st R shoulder  PT Visit Information  Onset Date: 21  PT Insurance Information: [employer]  Total # of Visits Approved: 10  Total # of Visits to Date: 1  Subjective  Subjective: pt presents to therapy with c/o R shoulder pain off/on for last 3 months of insidious onset; no PMH for R shoulder per pt; pt is L handed; MRI done + for small tear in subscap and slight OA of AC jt; no MEDS at this time; pt tells PT that he was injected on 21 with relief noted; no c/o N/T or clicking in the joint; sleep is hampered due to aching; RTD for follow-up 3/25/21  Pain Screening  Patient Currently in Pain: Yes  Pain Assessment  Pain Assessment: 0-10  Pain Level: 4  Pain Type: Acute pain  Pain Location: Shoulder  Pain Orientation: Right  Pain Descriptors: Aching  Pain Frequency: Intermittent  Functional Pain Assessment: Prevents or interferes with many active not passive activities  Vital Signs  Patient Currently in Pain: Yes    Objective     Observation/Palpation  Palpation: discomfort noted across ant/post poles of shoulder into upper trap and AC jt  Observation: posture:  forward head/rounded shoulders/B protracted scapulae    AROM RLE (degrees)  RLE AROM: WNL  AROM LLE (degrees)  LLE AROM : WNL  AROM RUE (degrees)  RUE AROM : WNL  AROM LUE (degrees)  LUE AROM : WNL  Spine  Cervical: WNL for all ranges with no c/o radiculopathy noted    Strength Other  Other: grossly 4+, 5/5 for all ranges R shoulder/RTC     Additional Measures  Special Tests: R shoulder:  empty can/short abd/drop arm   - for strength/+ for pain                            Yeargason's -/Speed -                   Rogers/Neer   -/-  Other: endurance for all prolonged activities is GOOD-  Sensation  Overall Sensation Status: WFL      Assessment   Conditions Requiring Skilled Therapeutic Intervention  Body structures, Functions, Activity limitations: Decreased strength;Decreased endurance  Assessment: pain noted across R shoulder with all prolonged activities, 4/10  Prognosis: Fair;Good  Decision Making: Low Complexity  Activity Tolerance  Activity Tolerance: Patient Tolerated treatment well       Plan   Plan  Times per week: pt to be seen 2x/week/4-5 weeks  Current Treatment Recommendations: ROM, Strengthening, Endurance Training, Modalities, Home Exercise Program    Goals  Time Frame for  goals: 4-5 weeks  goal 1: decrease pain across R shoulder with all prolonged activities, 0-2/10  goal 2: increase strength across R shoulder/RTC to grossly 5/5 for all ranges  goal 3: improve endurance for all prolonged activities to GOOD/GOOD+  goal 4: assure I with HEP for home management of condition    Patient goals : to get rid of the pain across his shoulder with all activities          Dixie Posada, PT

## 2021-02-03 ENCOUNTER — HOSPITAL ENCOUNTER (OUTPATIENT)
Dept: PHYSICAL THERAPY | Age: 56
Setting detail: THERAPIES SERIES
Discharge: HOME | End: 2021-02-03
Payer: COMMERCIAL

## 2021-02-03 PROCEDURE — G0283 ELEC STIM OTHER THAN WOUND: HCPCS | Performed by: PHYSICAL THERAPIST

## 2021-02-03 PROCEDURE — 97110 THERAPEUTIC EXERCISES: CPT | Performed by: PHYSICAL THERAPIST

## 2021-02-03 NOTE — PROGRESS NOTES
[address] [facility]                Phone: [phone]   Fax: [phone]    Physical Therapy Daily Treatment Note  Date:  2/3/2021    Patient Name:  Estefani Hudson. :  1965  MRN: [id number]    Evaluating therapist:  ARTURO Reza             (21)  Restrictions/Precautions:    Diagnosis:  R shoulder sp/st  Treatment Diagnosis:    Insurance/Certification information:  [employer]  Referring Physician:  Sameer De Dios of care signed (Y/N):    Visit# / total visits:  2/10  Pain level: 4/10   Time In:  1358  Time Out:  1443    Subjective:      Exercises:  Exercise/Equipment Resistance/Repetitions Other comments   UBE   3 min F/B            shrugs 1d12n1fw    scap ret 3i73l8ns           H/M pulls  9a53wjwoe    rows 3r99jclxx    IR/ER 1o87ycmzln    hor abd 5r65inndi                                                                                Other Therapeutic Activities:      Home Exercise Program:  provided 2/3/21    Manual Treatments:      Modalities:  IFC/MH to R shoulder x 15 min     Timed Code Treatment Minutes: Total Treatment Minutes:      Treatment/Activity Tolerance:  [] Patient tolerated treatment well [] Patient limited by fatique  [] Patient limited by pain  [] Patient limited by other medical complications  [] Other:     Prognosis: [] Good [] Fair  [] Poor    Patient Requires Follow-up: [] Yes  [] No    Plan:   [] Continue per plan of care [] Alter current plan (see comments)  [] Plan of care initiated [] Hold pending MD visit [] Discharge  Plan for Next Session:      See Weekly Progress Note: []  Yes  []  No  Next due:        Electronically signed by:   Teofilo Ojeda

## 2021-02-05 ENCOUNTER — HOSPITAL ENCOUNTER (OUTPATIENT)
Dept: PHYSICAL THERAPY | Age: 56
Setting detail: THERAPIES SERIES
Discharge: HOME | End: 2021-02-05
Payer: COMMERCIAL

## 2021-02-05 PROCEDURE — G0283 ELEC STIM OTHER THAN WOUND: HCPCS | Performed by: PHYSICAL THERAPIST

## 2021-02-05 PROCEDURE — 97110 THERAPEUTIC EXERCISES: CPT | Performed by: PHYSICAL THERAPIST

## 2021-02-05 NOTE — PROGRESS NOTES
[address] [facility]                Phone: [phone]   Fax: [phone]    Physical Therapy Daily Treatment Note  Date:  2021    Patient Name:  Cleve Lesch. :  1965  MRN: [id number]    Evaluating therapist:  ARTURO Patel             (21)  Restrictions/Precautions:    Diagnosis:  R shoulder sp/st  Treatment Diagnosis:    Insurance/Certification information:  [employer]  Referring Physician:  Patricio Molina of care signed (Y/N):    Visit# / total visits:  3/10  Pain level: 4/10   Time In:  1400  Time Out:  1452    Subjective:      Exercises:  Exercise/Equipment Resistance/Repetitions Other comments   UBE   3 min F/B            shrugs 0i87x6jf    scap ret 6g69r9fx           H/M pulls  3r20zicze    rows 4j94lbfey    IR/ER 9c58pzrfqr    hor abd 9e79tdwfb                                                                                Other Therapeutic Activities:      Home Exercise Program:  provided 2/3/21    Manual Treatments:      Modalities:  IFC/MH to R shoulder x 15 min     Timed Code Treatment Minutes: Total Treatment Minutes:      Treatment/Activity Tolerance:  [] Patient tolerated treatment well [] Patient limited by fatique  [] Patient limited by pain  [] Patient limited by other medical complications  [] Other:     Prognosis: [] Good [] Fair  [] Poor    Patient Requires Follow-up: [] Yes  [] No    Plan:   [] Continue per plan of care [] Alter current plan (see comments)  [] Plan of care initiated [] Hold pending MD visit [] Discharge  Plan for Next Session:      See Weekly Progress Note: []  Yes  []  No  Next due:        Electronically signed by:   Mo Calero

## 2021-02-08 ENCOUNTER — HOSPITAL ENCOUNTER (OUTPATIENT)
Dept: PHYSICAL THERAPY | Age: 56
Setting detail: THERAPIES SERIES
Discharge: HOME | End: 2021-02-08
Payer: COMMERCIAL

## 2021-02-08 PROCEDURE — 97110 THERAPEUTIC EXERCISES: CPT | Performed by: PHYSICAL THERAPIST

## 2021-02-08 PROCEDURE — G0283 ELEC STIM OTHER THAN WOUND: HCPCS | Performed by: PHYSICAL THERAPIST

## 2021-02-08 NOTE — PROGRESS NOTES
[address] [facility]                Phone: [phone]   Fax: [phone]    Physical Therapy Daily Treatment Note  Date:  2021    Patient Name:  Cory Louis. :  1965  MRN: [id number]    Evaluating therapist:  ARTURO Hernandez             (21)  Restrictions/Precautions:    Diagnosis:  R shoulder sp/st  Treatment Diagnosis:    Insurance/Certification information:  [facility] [employer]  Referring Physician:  Emily Mohan of care signed (Y/N):    Visit# / total visits:  4/10  Pain level: 4/10   Time In:  1354  Time Out:  1440    Subjective:      Exercises:  Exercise/Equipment Resistance/Repetitions Other comments   UBE   3 min F/B            shrugs 4w06b0ek    scap ret 0y52l1hm           H/M pulls  0m22klzkl    rows 8s54ozijv    IR/ER 6l42pwqibj    hor abd 6j26jwyba                                                                                Other Therapeutic Activities:      Home Exercise Program:  provided 2/3/21    Manual Treatments:      Modalities:  IFC/MH to R shoulder x 15 min     Timed Code Treatment Minutes: Total Treatment Minutes:      Treatment/Activity Tolerance:  [] Patient tolerated treatment well [] Patient limited by fatique  [] Patient limited by pain  [] Patient limited by other medical complications  [] Other:     Prognosis: [] Good [] Fair  [] Poor    Patient Requires Follow-up: [] Yes  [] No    Plan:   [] Continue per plan of care [] Alter current plan (see comments)  [] Plan of care initiated [] Hold pending MD visit [] Discharge  Plan for Next Session:      See Weekly Progress Note: []  Yes  []  No  Next due:        Electronically signed by:   Mortimer Bonine

## 2021-02-12 ENCOUNTER — HOSPITAL ENCOUNTER (OUTPATIENT)
Dept: PHYSICAL THERAPY | Age: 56
Setting detail: THERAPIES SERIES
Discharge: HOME | End: 2021-02-12
Payer: COMMERCIAL

## 2021-02-12 NOTE — PROGRESS NOTES
S:  pt presents to therapy for one of two scheduled visits this week, having to cancel on 2/12/21; at week's only visit he reported that his shoulder was doing OK with aching persisting across the front/side with most prolonged activities; pain level given as 4/10 and does slow him at time; HEP going well per pt    O:  performed the exercises/treatments as written in the flowsheet for the week ending 2/12/21;  AROM R shoulder WNL for all range and strength across R shoulder grossly 4+, 5/5 for all ranges     A:  diony tx well; pt able to perform all requested tasks with good form and pacing noted; AROM/strength across R shoulder grossly stable since eval; endurance for all prolonged activiites is GOOD-; pt c/o aching across front of shoulder into bicep with some activities; movement smoother with near normal scapulo-thoracic rhythm noted in all ranges     P:  cont with POC of stretching/strengthening for shoulder with modalities as needed

## 2021-02-12 NOTE — PROGRESS NOTES
[address] [facility]                Phone: [phone]  Fax: [phone]    Physical Therapy  Cancellation/No-show Note  Patient Name:  Susan Tolbert. :  1965   Date:  2021    For today's appointment patient:  [x]  Cancelled  []  Rescheduled appointment  []  No-show     Reason given by patient:  []  Patient ill  []  Conflicting appointment  []  No transportation    []  Conflict with work  []  No reason given  [x]  Other:  family issue    Comments:      Electronically signed by:   Rose Roland

## 2021-02-17 ENCOUNTER — HOSPITAL ENCOUNTER (OUTPATIENT)
Dept: PHYSICAL THERAPY | Age: 56
Setting detail: THERAPIES SERIES
Discharge: HOME | End: 2021-02-17
Payer: COMMERCIAL

## 2021-02-17 PROCEDURE — G0283 ELEC STIM OTHER THAN WOUND: HCPCS | Performed by: PHYSICAL THERAPIST

## 2021-02-17 PROCEDURE — 97110 THERAPEUTIC EXERCISES: CPT | Performed by: PHYSICAL THERAPIST

## 2021-02-17 NOTE — PROGRESS NOTES
[address] [facility]                Phone: [phone]   Fax: [phone]    Physical Therapy Daily Treatment Note  Date:  2021    Patient Name:  Estefani Hudson. :  1965  MRN: [id number]    Evaluating therapist:  ARTURO Reza             (21)  Restrictions/Precautions:    Diagnosis:  R shoulder sp/st  Treatment Diagnosis:    Insurance/Certification information:  [employer]  Referring Physician:  Latisha Coffman  (3/24/21)  Plan of care signed (Y/N):    Visit# / total visits:  5/10  Pain level: 4/10   Time In:  1356  Time Out:  1447    Subjective:      Exercises:  Exercise/Equipment Resistance/Repetitions Other comments   UBE   3 min F/B            shrugs 6v55o0fe    scap ret 8e46n3ww           H/M pulls  0t17frfdmha    rows 3p91takfknl    IR/ER 2y04ejlgc    hor abd 2o02lcowhjy                                                                                Other Therapeutic Activities:      Home Exercise Program:  provided 2/3/21    Manual Treatments:      Modalities:  IFC/MH to R shoulder x 15 min     Timed Code Treatment Minutes: Total Treatment Minutes:      Treatment/Activity Tolerance:  [] Patient tolerated treatment well [] Patient limited by fatique  [] Patient limited by pain  [] Patient limited by other medical complications  [] Other:     Prognosis: [] Good [] Fair  [] Poor    Patient Requires Follow-up: [] Yes  [] No    Plan:   [] Continue per plan of care [] Alter current plan (see comments)  [] Plan of care initiated [] Hold pending MD visit [] Discharge  Plan for Next Session:      See Weekly Progress Note: []  Yes  []  No  Next due:        Electronically signed by:   Teofilo Ojeda

## 2021-02-19 ENCOUNTER — HOSPITAL ENCOUNTER (OUTPATIENT)
Dept: PHYSICAL THERAPY | Age: 56
Setting detail: THERAPIES SERIES
Discharge: HOME | End: 2021-02-19
Payer: COMMERCIAL

## 2021-02-19 PROCEDURE — 97110 THERAPEUTIC EXERCISES: CPT | Performed by: PHYSICAL THERAPIST

## 2021-02-19 PROCEDURE — G0283 ELEC STIM OTHER THAN WOUND: HCPCS | Performed by: PHYSICAL THERAPIST

## 2021-02-19 NOTE — PROGRESS NOTES
[address] [facility]                Phone: [phone]   Fax: [phone]    Physical Therapy Daily Treatment Note  Date:  2021    Patient Name:  Asher Giraldo. :  1965  MRN: [id number]    Evaluating therapist:  ARTURO Carter             (21)  Restrictions/Precautions:    Diagnosis:  R shoulder sp/st  Treatment Diagnosis:    Insurance/Certification information:  [employer]  Referring Physician:  Jona Diaz  (3/24/21)  Plan of care signed (Y/N):    Visit# / total visits:  6/10  Pain level: 4/10   Time In:  6876  Time Out:  1423    Subjective:      Exercises:  Exercise/Equipment Resistance/Repetitions Other comments   UBE   3 min F/B            shrugs 7v64j0dy    scap ret 9k52e5dv           H/M pulls  2u27acuenbu    rows 3h79kvjifvy    IR/ER 2c37oqrmu    hor abd 8l79yuvzpqt                                                                                Other Therapeutic Activities:      Home Exercise Program:  provided 2/3/21    Manual Treatments:      Modalities:  IFC/MH to R shoulder x 15 min     Timed Code Treatment Minutes: Total Treatment Minutes:      Treatment/Activity Tolerance:  [] Patient tolerated treatment well [] Patient limited by fatique  [] Patient limited by pain  [] Patient limited by other medical complications  [] Other:     Prognosis: [] Good [] Fair  [] Poor    Patient Requires Follow-up: [] Yes  [] No    Plan:   [] Continue per plan of care [] Alter current plan (see comments)  [] Plan of care initiated [] Hold pending MD visit [] Discharge  Plan for Next Session:      See Weekly Progress Note: []  Yes  []  No  Next due:        Electronically signed by:   Cosmo Corrales

## 2021-02-19 NOTE — PROGRESS NOTES
S:  pt presents to therapy for two of two scheduled visits this week; at this time he reports  that his shoulder is OK with aching persisting across the front/side with most prolonged activities; pain level given down to 2/10 and does seem less limiting to him; sleep and prolonged postures continue to present a problem; HEP going well per pt    O:  performed the exercises/treatments as written in the flowsheet for the week ending 2/19/21;  AROM R shoulder WNL for all range and strength across R shoulder grossly 4+, 5/5 for all ranges     A:  diony tx well; pt able to perform all requested tasks with good form and pacing noted; AROM/strength across R shoulder grossly stable since eval; endurance for all prolonged activiites is GOOD-; pt c/o aching across front of shoulder into bicep with some activities; movement smoother with near normal scapulo-thoracic rhythm noted in all ranges     P:  cont with POC of stretching/strengthening for shoulder with modalities as needed

## 2021-02-22 ENCOUNTER — HOSPITAL ENCOUNTER (OUTPATIENT)
Dept: PHYSICAL THERAPY | Age: 56
Setting detail: THERAPIES SERIES
Discharge: HOME | End: 2021-02-22
Payer: COMMERCIAL

## 2021-02-22 PROCEDURE — 97110 THERAPEUTIC EXERCISES: CPT | Performed by: PHYSICAL THERAPIST

## 2021-02-22 PROCEDURE — G0283 ELEC STIM OTHER THAN WOUND: HCPCS | Performed by: PHYSICAL THERAPIST

## 2021-02-22 NOTE — PROGRESS NOTES
[address] [facility]                Phone: [phone]   Fax: [phone]    Physical Therapy Daily Treatment Note  Date:  2021    Patient Name:  Gisela Arboleda :  1965  MRN: [id number]    Evaluating therapist:  ARTURO Douglas             (21)  Restrictions/Precautions:    Diagnosis:  R shoulder sp/st  Treatment Diagnosis:    Insurance/Certification information:  [employer]  Referring Physician:  Adam Vanessa  (3/24/21)  Plan of care signed (Y/N):    Visit# / total visits:  7/10  Pain level: 4/10   Time In:  1327  Time Out:  1411    Subjective:      Exercises:  Exercise/Equipment Resistance/Repetitions Other comments   UBE   3 min F/B            shrugs 0u22b1ao    scap ret 4x45r5ps           H/M pulls  0q76dxvwgnl    rows 9h18zmngyup    IR/ER 9p30iunxu    hor abd 0x39omccshs                                                                                Other Therapeutic Activities:      Home Exercise Program:  provided 2/3/21    Manual Treatments:      Modalities:  IFC/MH to R shoulder x 15 min     Timed Code Treatment Minutes: Total Treatment Minutes:      Treatment/Activity Tolerance:  [] Patient tolerated treatment well [] Patient limited by fatique  [] Patient limited by pain  [] Patient limited by other medical complications  [] Other:     Prognosis: [] Good [] Fair  [] Poor    Patient Requires Follow-up: [] Yes  [] No    Plan:   [] Continue per plan of care [] Alter current plan (see comments)  [] Plan of care initiated [] Hold pending MD visit [] Discharge  Plan for Next Session:      See Weekly Progress Note: []  Yes  []  No  Next due:        Electronically signed by:   Richy Dhillon

## 2021-02-26 ENCOUNTER — HOSPITAL ENCOUNTER (OUTPATIENT)
Dept: PHYSICAL THERAPY | Age: 56
Setting detail: THERAPIES SERIES
Discharge: HOME | End: 2021-02-26
Payer: COMMERCIAL

## 2021-02-26 PROCEDURE — G0283 ELEC STIM OTHER THAN WOUND: HCPCS | Performed by: PHYSICAL THERAPIST

## 2021-02-26 PROCEDURE — 97110 THERAPEUTIC EXERCISES: CPT | Performed by: PHYSICAL THERAPIST

## 2021-02-26 NOTE — PROGRESS NOTES
S:  pt presents to therapy for two of two scheduled visits this week; at this time he reports  that his shoulder seems to be the same with  aching persisting across the front/side with most prolonged activities; pain level given down to 2/10 and does seem less limiting to him; sleep and prolonged postures continue to present a problem; HEP going well per pt    O:  performed the exercises/treatments as written in the flowsheet for the week ending 2/26/21;  AROM R shoulder WNL for all range and strength across R shoulder grossly 4+, 5/5 for all ranges     A:  diony tx well; pt able to perform all requested tasks with good form and pacing noted; AROM/strength across R shoulder grossly stable since eval; endurance for all prolonged activiites is GOOD-; pt c/o aching across front of shoulder into bicep with some activities; movement smoother with near normal scapulo-thoracic rhythm noted in all ranges     P:  cont with POC of stretching/strengthening for shoulder with modalities as needed

## 2021-02-26 NOTE — PROGRESS NOTES
[address] [facility]                Phone: [phone]   Fax: [phone]    Physical Therapy Daily Treatment Note  Date:  2021    Patient Name:  Arthur Velasquez. :  1965  MRN: [id number]    Evaluating therapist:  ARTURO Balderrama             (21)  Restrictions/Precautions:    Diagnosis:  R shoulder sp/st  Treatment Diagnosis:    Insurance/Certification information:  [employer]  Referring Physician:  Phoenix Hinds  (3/24/21)  Plan of care signed (Y/N):    Visit# / total visits:  8/10  Pain level: 4/10   Time In:  9472  Time Out:  1409    Subjective:      Exercises:  Exercise/Equipment Resistance/Repetitions Other comments   UBE   3 min F/B            shrugs 2n61l8yp    scap ret 9a51g7bj           H/M pulls  4u70rpbtqhe    rows 9a89mbygsbb    IR/ER 2t45fqzhm    hor abd 2e66vajghzq                                                                                Other Therapeutic Activities:      Home Exercise Program:  provided 2/3/21    Manual Treatments:      Modalities:  IFC/MH to R shoulder x 15 min     Timed Code Treatment Minutes: Total Treatment Minutes:      Treatment/Activity Tolerance:  [] Patient tolerated treatment well [] Patient limited by fatique  [] Patient limited by pain  [] Patient limited by other medical complications  [] Other:     Prognosis: [] Good [] Fair  [] Poor    Patient Requires Follow-up: [] Yes  [] No    Plan:   [] Continue per plan of care [] Alter current plan (see comments)  [] Plan of care initiated [] Hold pending MD visit [] Discharge  Plan for Next Session:      See Weekly Progress Note: []  Yes  []  No  Next due:        Electronically signed by:   Eduardo Alexander

## 2021-03-01 ENCOUNTER — HOSPITAL ENCOUNTER (OUTPATIENT)
Dept: PHYSICAL THERAPY | Age: 56
Setting detail: THERAPIES SERIES
Discharge: HOME | End: 2021-03-01
Payer: COMMERCIAL

## 2021-03-01 PROCEDURE — G0283 ELEC STIM OTHER THAN WOUND: HCPCS | Performed by: PHYSICAL THERAPIST

## 2021-03-01 PROCEDURE — 97110 THERAPEUTIC EXERCISES: CPT | Performed by: PHYSICAL THERAPIST

## 2021-03-01 NOTE — PROGRESS NOTES
[address] [facility]                Phone: [phone]   Fax: [phone]    Physical Therapy Daily Treatment Note  Date:  3/1/2021    Patient Name:  Kandis Huang. :  1965  MRN: [id number]    Evaluating therapist:  ARTURO Segura             (21)  Restrictions/Precautions:    Diagnosis:  R shoulder sp/st  Treatment Diagnosis:    Insurance/Certification information:  [employer]  Referring Physician:  Zacarias Quiroga  (3/24/21)  Plan of care signed (Y/N):    Visit# / total visits:  9/10  Pain level: 4/10   Time In:  1328  Time Out:  1409    Subjective:      Exercises:  Exercise/Equipment Resistance/Repetitions Other comments   UBE   3 min F/B            shrugs 6p04g8jb    scap ret 8c00c7vh           H/M pulls  6b70mdgcjcz    rows 1a42zrfaert    IR/ER 7b35yklew    hor abd 3p78jfwhyph                                                                                Other Therapeutic Activities:      Home Exercise Program:  provided 2/3/21    Manual Treatments:      Modalities:  IFC/MH to R shoulder x 15 min     Timed Code Treatment Minutes: Total Treatment Minutes:      Treatment/Activity Tolerance:  [] Patient tolerated treatment well [] Patient limited by fatique  [] Patient limited by pain  [] Patient limited by other medical complications  [] Other:     Prognosis: [] Good [] Fair  [] Poor    Patient Requires Follow-up: [] Yes  [] No    Plan:   [] Continue per plan of care [] Alter current plan (see comments)  [] Plan of care initiated [] Hold pending MD visit [] Discharge  Plan for Next Session:      See Weekly Progress Note: []  Yes  []  No  Next due:        Electronically signed by:   Elissa Be

## 2021-03-05 ENCOUNTER — HOSPITAL ENCOUNTER (OUTPATIENT)
Dept: PHYSICAL THERAPY | Age: 56
Setting detail: THERAPIES SERIES
Discharge: HOME | End: 2021-03-05
Payer: COMMERCIAL

## 2021-03-05 NOTE — PROGRESS NOTES
[address] [facility]                Phone: [phone]  Fax: [phone]    Physical Therapy  Cancellation/No-show Note  Patient Name:  Chau Fernandes :  1965   Date:  3/5/2021    For today's appointment patient:  [x]  Cancelled  []  Rescheduled appointment  []  No-show     Reason given by patient:  []  Patient ill  []  Conflicting appointment  []  No transportation    [x]  Conflict with work  []  No reason given  []  Other:      Comments:      Electronically signed by:   Hoa Zaidi

## 2021-03-08 ENCOUNTER — HOSPITAL ENCOUNTER (OUTPATIENT)
Dept: PHYSICAL THERAPY | Age: 56
Setting detail: THERAPIES SERIES
Discharge: HOME | End: 2021-03-08
Payer: COMMERCIAL

## 2021-03-08 PROCEDURE — 97110 THERAPEUTIC EXERCISES: CPT | Performed by: PHYSICAL THERAPIST

## 2021-03-08 PROCEDURE — G0283 ELEC STIM OTHER THAN WOUND: HCPCS | Performed by: PHYSICAL THERAPIST

## 2021-03-08 NOTE — PROGRESS NOTES
[address] [facility]                Phone: [phone]   Fax: [phone]    Physical Therapy Daily Treatment Note  Date:  3/8/2021    Patient Name:  Evangelina Meyer. :  1965  MRN: [id number]    Evaluating therapist:  Devera Hammans, MPT             (21)  Restrictions/Precautions:    Diagnosis:  R shoulder sp/st  Treatment Diagnosis:    Insurance/Certification information:  [employer]  Referring Physician:  Jack Iqbal  (3/24/21)  Plan of care signed (Y/N):    Visit# / total visits:  10/10  Pain level: 4/10   Time In:  1332  Time Out:  1414    Subjective:      Exercises:  Exercise/Equipment Resistance/Repetitions Other comments   UBE   3 min F/B            shrugs 9m76a1eg    scap ret 9x50d5ku           H/M pulls  5k99uwsjkzk    rows 5k71umqkvum    IR/ER 3q41zmckx    hor abd 8h87uyyknde                                                                                Other Therapeutic Activities:      Home Exercise Program:  provided 2/3/21    Manual Treatments:      Modalities:  IFC/MH to R shoulder x 15 min     Timed Code Treatment Minutes: Total Treatment Minutes:      Treatment/Activity Tolerance:  [] Patient tolerated treatment well [] Patient limited by fatique  [] Patient limited by pain  [] Patient limited by other medical complications  [] Other:     Prognosis: [] Good [] Fair  [] Poor    Patient Requires Follow-up: [] Yes  [] No    Plan:   [] Continue per plan of care [] Alter current plan (see comments)  [] Plan of care initiated [] Hold pending MD visit [] Discharge  Plan for Next Session:      See Weekly Progress Note: []  Yes  []  No  Next due:        Electronically signed by:   Priti Palomo

## 2021-03-11 ENCOUNTER — OFFICE VISIT (OUTPATIENT)
Dept: FAMILY MEDICINE CLINIC | Age: 56
End: 2021-03-11
Payer: COMMERCIAL

## 2021-03-11 VITALS
DIASTOLIC BLOOD PRESSURE: 113 MMHG | SYSTOLIC BLOOD PRESSURE: 160 MMHG | HEART RATE: 74 BPM | RESPIRATION RATE: 20 BRPM | TEMPERATURE: 97.2 F | HEIGHT: 75 IN | BODY MASS INDEX: 32.5 KG/M2

## 2021-03-11 DIAGNOSIS — E66.9 DIABETES MELLITUS TYPE 2 IN OBESE (HCC): Primary | ICD-10-CM

## 2021-03-11 DIAGNOSIS — E55.9 VITAMIN D DEFICIENCY: ICD-10-CM

## 2021-03-11 DIAGNOSIS — E78.00 PURE HYPERCHOLESTEROLEMIA: ICD-10-CM

## 2021-03-11 DIAGNOSIS — E11.69 DIABETES MELLITUS TYPE 2 IN OBESE (HCC): Primary | ICD-10-CM

## 2021-03-11 DIAGNOSIS — E66.09 CLASS 1 OBESITY DUE TO EXCESS CALORIES WITH SERIOUS COMORBIDITY AND BODY MASS INDEX (BMI) OF 33.0 TO 33.9 IN ADULT: ICD-10-CM

## 2021-03-11 DIAGNOSIS — I10 ESSENTIAL HYPERTENSION: ICD-10-CM

## 2021-03-11 LAB — HBA1C MFR BLD: 8.3 %

## 2021-03-11 PROCEDURE — 3052F HG A1C>EQUAL 8.0%<EQUAL 9.0%: CPT | Performed by: FAMILY MEDICINE

## 2021-03-11 PROCEDURE — 83036 HEMOGLOBIN GLYCOSYLATED A1C: CPT | Performed by: FAMILY MEDICINE

## 2021-03-11 PROCEDURE — 99214 OFFICE O/P EST MOD 30 MIN: CPT | Performed by: FAMILY MEDICINE

## 2021-03-11 RX ORDER — LOSARTAN POTASSIUM AND HYDROCHLOROTHIAZIDE 12.5; 5 MG/1; MG/1
1 TABLET ORAL 2 TIMES DAILY
Qty: 60 TABLET | Refills: 2 | Status: SHIPPED
Start: 2021-03-11 | End: 2021-03-11

## 2021-03-11 RX ORDER — LOSARTAN POTASSIUM AND HYDROCHLOROTHIAZIDE 12.5; 5 MG/1; MG/1
1 TABLET ORAL 2 TIMES DAILY
Qty: 180 TABLET | Refills: 1 | Status: SHIPPED
Start: 2021-03-11 | End: 2021-11-16

## 2021-03-11 RX ORDER — SIMVASTATIN 10 MG
10 TABLET ORAL DAILY
Qty: 90 TABLET | Refills: 1 | Status: SHIPPED
Start: 2021-03-11 | End: 2021-11-16

## 2021-03-11 RX ORDER — GLIPIZIDE 5 MG/1
5 TABLET ORAL
Qty: 180 TABLET | Refills: 1 | Status: SHIPPED
Start: 2021-03-11 | End: 2021-11-16

## 2021-03-11 RX ORDER — GLIPIZIDE 5 MG/1
5 TABLET ORAL
Qty: 60 TABLET | Refills: 2 | Status: SHIPPED
Start: 2021-03-11 | End: 2021-03-11

## 2021-03-11 ASSESSMENT — PATIENT HEALTH QUESTIONNAIRE - PHQ9
2. FEELING DOWN, DEPRESSED OR HOPELESS: 0
SUM OF ALL RESPONSES TO PHQ QUESTIONS 1-9: 0
SUM OF ALL RESPONSES TO PHQ QUESTIONS 1-9: 0

## 2021-03-11 ASSESSMENT — ENCOUNTER SYMPTOMS
RHINORRHEA: 0
CONSTIPATION: 0
COUGH: 0
BACK PAIN: 0
EYE ITCHING: 0
SHORTNESS OF BREATH: 0
EYE PAIN: 0
ABDOMINAL PAIN: 0
NAUSEA: 0
DIARRHEA: 0
CHEST TIGHTNESS: 0
SORE THROAT: 0

## 2021-03-11 NOTE — PROGRESS NOTES
HPI:  49 y.o.m. with PMH HLD, HTN, DM, Vit D deficiency presents as a new pt, transferring care from Dr. [facility], changing d/t dissatisfaction with care/coordination. Prior charts reviewed. H/o recent right shoulder pain - following with Dr. Yon Wilson with ortho. Had MRI, thought based on PE to have 1/2021 partial tear right subscapularis s/p subacromial injection of kenalog/PT. States shoulder pain improved but worsening right biceps pain with abduction/extension right shoulder. To see Dr. Yon Wilson again in f/u 3/26/21. PMH: Type II DM, hyperlipidemia, essential HTN, vitamin D deficiency, seasonal allergies, COVID 19 11/2020    Surg Hx:      Current Outpatient Medications   Medication Sig Dispense Refill    losartan-hydroCHLOROthiazide (HYZAAR) 100-25 MG per tablet TAKE ONE TABLET BY MOUTH DAILY 30 tablet 5    dapagliflozin (FARXIGA) 10 MG tablet Take 1 tablet by mouth every morning 90 tablet 3    simvastatin (ZOCOR) 5 MG tablet Take 1 tablet by mouth daily 90 tablet 3    montelukast (SINGULAIR) 10 MG tablet TAKE ONE TABLET BY MOUTH NIGHTLY 30 tablet 5    blood glucose test strips (ASCENSIA AUTODISC VI;ONE TOUCH ULTRA TEST VI) strip 1 each by In Vitro route 2 times daily As needed. 200 each 1    Lancets Ultra Fine MISC 1 each by Does not apply route 2 times daily 200 each 1    Cholecalciferol (VITAMIN D3) 5000 UNITS TABS Take by mouth daily LD 7/25/19      KRILL OIL PO Take by mouth daily LD 7/25/19      aspirin 81 MG tablet Take 81 mg by mouth daily LD 7/25/19 (takes for healthy living)      Multiple Vitamins-Minerals (CENTRUM PO) Take 1 tablet by mouth daily LD 7/25/19      glipiZIDE (GLUCOTROL) 5 MG tablet       metFORMIN (GLUCOPHAGE-XR) 500 MG extended release tablet Take 2 tablets by mouth 2 times daily 360 tablet 1     No current facility-administered medications for this visit. ALL: NKDA    Social hx: smokes approximately 10 cigs/week. EtOH drinks 2 beer or mixed drinks/week.  No illicit drug use. . Owns [employer]. [address]:   Mother: dementia, gout, HTN, pre-diabetic  Father: no significant medical hx  Sister: no significant medical hx  Children: healthy    Review of Systems - History obtained from the patient  General ROS: negative for - chills, fatigue or fever  Psychological ROS: negative for - anxiety or depression  ENT ROS: negative for - headaches or visual changes  Allergy and Immunology ROS: positive for - seasonal allergies  Endocrine ROS: negative for - unexpected weight changes  Respiratory ROS: negative for - cough or shortness of breath  Cardiovascular ROS: negative for - chest pain, edema or palpitations  Gastrointestinal ROS: negative for - diarrhea or nausea/vomiting  Genito-Urinary ROS: negative for - dysuria or hematuria  Musculoskeletal ROS: positive for - joint pain and muscle pain  Neurological ROS: negative for - headaches or numbness/tingling, weakness  Dermatological ROS: negative for - dry skin or rash      /113  OBJ: General: alert, cooperative, in NAD  Skin: warm, dry, free of rashes  HEENT: Normocephalic. Pupils equal, round. Mucous membranes pink, moist.   Neck: supple  Lungs: CTA b/l. Respirations even and unlabored on room air  Heart: RR S1, S2. no murmur appreciated  ABD: soft, nontender, nondistended. +BS  Extr: no edema    No results found for requested labs within last 720 hours. LABS: 10/1/20 HbA1C 8.1, HbA1C 8.4 on 6/17/2020. TGs 123, HDL 40, LDL 78, VLDL 25    ASSESS:  Type II DM - last HbA1C 8.1 on 10/1/20        PLAN:   HbA1C  Repeat BP, pt states he has \"white coat\" changes. Encourage monitoring blood glucose. D/w pt low carb diet - states he is aware but stress eats and not really following low carb diet.          April Cecy Bridges, OMS-3

## 2021-03-11 NOTE — PROGRESS NOTES
Grace Mckeon. : 1965    Chief Complaint:     Chief Complaint   Patient presents with   [address] Ángela Miller. 49 y.o. presents for   Chief Complaint   Patient [facility] with   [facility] to [facility] care. Previous PCP was Dr. Marya Ann. He does have a history of diabetes admits to not watching his diet recently. He is currently on Francee Ramal as well as Metformin. His A1c has been consistently at 8 for the past year or so. He has not been checking his sugars at home. His blood pressure is also slightly elevated. He states that he was on losartan HCTZ 5012-1/2 twice daily. He states this worked for him better than the once a day medication. He wishes for this to be changed. He does check his blood pressure at home and has been elevated. He recently had blood work done October for a lipid panel which was within normal limits. He has been taking Zocor 10 mg once daily. He denies any issues with this medication. He currently denies any chest pain, shortness of breath, swelling anywhere, neurologic changes. All questions were answered to patients satisfaction.     Past Medical History:   Diagnosis Date    Diabetes mellitus ([facility] [address]     Encounter for screening colonoscopy     Hyperlipemia     Hypertension        Past Surgical History:   Procedure Laterality Date    COLONOSCOPY N/A 2019    COLORECTAL CANCER SCREENING, NOT HIGH RISK performed by Kassy Canchola MD at [place] [address]      22yrs ago    UPPER GASTROINTESTINAL ENDOSCOPY  12    ESOPHAGOGASTRODUODENOSCOPY       Social History     Socioeconomic History    Marital status:      Spouse name: None    Number of children: 1    Years of education: None    Highest education level: None   Occupational History    Occupation: active worker     Comment: [employer]   Social Needs    Financial resource strain: None  Food insecurity     Worry: None     Inability: None    Transportation needs     Medical: None     Non-medical: None   Tobacco Use    Smoking status: Never Smoker    Smokeless tobacco: Never Used    Tobacco comment: social   Substance and Sexual Activity    Alcohol use: Yes     Comment: Once per week    Drug use: No    Sexual activity: Yes   Lifestyle    Physical activity     Days per week: None     Minutes per session: None    Stress: None   Relationships    Social connections     Talks on phone: None     Gets together: None     Attends [employer] service: None     Active member of club or organization: None     Attends meetings of clubs or organizations: None     Relationship status: None    Intimate partner violence     Fear of current or ex partner: None     Emotionally abused: None     Physically abused: None     Forced sexual activity: None   Other Topics Concern    None   Social History Narrative    None       Family History   Problem Relation Age of Onset    Alzheimer's Disease Mother     No Known Problems Father     Anxiety Disorder Sister           Current Outpatient Medications   Medication Sig Dispense Refill    simvastatin (ZOCOR) 10 MG tablet Take 1 tablet by mouth daily 90 tablet 1    losartan-hydroCHLOROthiazide (HYZAAR) 50-12.5 MG per tablet Take 1 tablet by mouth 2 times daily 180 tablet 1    glipiZIDE (GLUCOTROL) 5 MG tablet Take 1 tablet by mouth 2 times daily (before meals) 180 tablet 1    dapagliflozin (FARXIGA) 10 MG tablet Take 1 tablet by mouth every morning 90 tablet 3    montelukast (SINGULAIR) 10 MG tablet TAKE ONE TABLET BY MOUTH NIGHTLY 30 tablet 5    blood glucose test strips (ASCENSIA AUTODISC VI;ONE TOUCH ULTRA TEST VI) strip 1 each by In Vitro route 2 times daily As needed.  200 each 1    Lancets Ultra Fine MISC 1 each by Does not apply route 2 times daily 200 each 1    Cholecalciferol (VITAMIN D3) 5000 UNITS TABS Take by mouth daily LD 7/25/19      KRILL OIL PO Take by mouth daily LD 7/25/19      aspirin 81 MG tablet Take 81 mg by mouth daily LD 7/25/19 (takes for healthy living)      Multiple Vitamins-Minerals (CENTRUM PO) Take 1 tablet by mouth daily LD 7/25/19      metFORMIN (GLUCOPHAGE-XR) 500 MG extended release tablet Take 2 tablets by mouth 2 times daily 360 tablet 1     No current facility-administered medications for this visit. No Known Allergies    Health Maintenance Due   Topic Date Due    Hepatitis C screen  Never done    HIV screen  Never done    COVID-19 Vaccine (1 of 2) Never done    Hepatitis B vaccine (1 of 3 - Risk 3-dose series) Never done    DTaP/Tdap/Td vaccine (1 - Tdap) Never done    Shingles Vaccine (1 of 2) Never done           REVIEW OF SYSTEMS  Review of Systems   Constitutional: Negative for chills, fatigue and fever. HENT: Negative for congestion, ear pain, postnasal drip, rhinorrhea and sore throat. Eyes: Negative for pain and itching. Respiratory: Negative for cough, chest tightness and shortness of breath. Cardiovascular: Negative for chest pain and leg swelling. Gastrointestinal: Negative for abdominal pain, constipation, diarrhea and nausea. Endocrine: Negative for heat intolerance. Genitourinary: Negative for flank pain, frequency and urgency. Musculoskeletal: Negative for arthralgias and back pain. Skin: Negative for pallor and rash. Allergic/Immunologic: Negative for environmental allergies. Neurological: Negative for dizziness, numbness and headaches. Hematological: Does not bruise/bleed easily. Psychiatric/Behavioral: Negative for agitation, behavioral problems and hallucinations. PHYSICAL EXAM  BP (!) 160/113   Pulse 74   Temp 97.2 °F (36.2 °C) (Temporal)   Resp 20   Ht 6' 3\" (1.905 m)   BMI 32.50 kg/m²   Physical Exam  Vitals signs and nursing note reviewed. Constitutional:       General: He is not in acute distress. Appearance: He is well-developed.  He is not diaphoretic. HENT:      Head: Normocephalic and atraumatic. Right Ear: External ear normal.      Left Ear: External ear normal.      Nose: Nose normal.      Mouth/Throat:      Pharynx: No oropharyngeal exudate. Eyes:      General:         Right eye: No discharge. Left eye: No discharge. Conjunctiva/sclera: Conjunctivae normal.   Neck:      Musculoskeletal: Normal range of motion and neck supple. Thyroid: No thyromegaly. Cardiovascular:      Rate and Rhythm: Normal rate and regular rhythm. Heart sounds: Normal heart sounds. No murmur. No friction rub. No gallop. Pulmonary:      Effort: Pulmonary effort is normal.      Breath sounds: Normal breath sounds. No wheezing or rales. Chest:      Chest wall: No tenderness. Abdominal:      Palpations: Abdomen is soft. Tenderness: There is no abdominal tenderness. Musculoskeletal: Normal range of motion. General: No tenderness. Lymphadenopathy:      Cervical: No cervical adenopathy. Skin:     General: Skin is warm and dry. Findings: No erythema or rash. Neurological:      Mental Status: He is alert and oriented to person, place, and time. Coordination: Coordination normal.      Deep Tendon Reflexes: Reflexes are normal and symmetric. Psychiatric:         Behavior: Behavior normal.         Thought Content: Thought content normal.         Judgment: Judgment normal.              Laboratory:   All laboratory and radiology results have been personally reviewed by myself    Lab Results   Component Value Date     06/17/2020    K 4.4 06/17/2020     06/17/2020    CO2 27 06/17/2020    BUN 12 06/17/2020    CREATININE 0.7 06/17/2020    PROT 7.1 06/17/2020    LABALBU 4.2 06/17/2020    LABALBU 4.4 05/15/2012    CALCIUM 9.7 06/17/2020    GFRAA >60 06/17/2020    LABGLOM >60 06/17/2020    GLUCOSE 156 06/17/2020    GLUCOSE 147 05/15/2012    AST 23 06/17/2020    ALT 21 06/17/2020    ALKPHOS 49 06/17/2020 BILITOT 0.7 06/17/2020    TSH 1.960 06/27/2017    VITD25 43 06/17/2020    CHOL 143 10/01/2020    TRIG 123 10/01/2020    HDL 40 10/01/2020    LDLCALC 78 10/01/2020    LABA1C 8.3 03/11/2021        Lab Results   Component Value Date    CHOL 143 10/01/2020    CHOL 133 06/17/2020    CHOL 138 09/30/2019     Lab Results   Component Value Date    TRIG 123 10/01/2020    TRIG 188 (H) 06/17/2020    TRIG 156 (H) 09/30/2019     Lab Results   Component Value Date    HDL 40 10/01/2020    HDL 32 06/17/2020    HDL 36 09/30/2019     Lab Results   Component Value Date    LDLCALC 78 10/01/2020    LDLCALC 63 06/17/2020    LDLCALC 71 09/30/2019    LDLCHOLESTEROL 82 10/29/2015       Lab Results   Component Value Date    LABA1C 8.3 03/11/2021    LABA1C 8.1 (H) 10/01/2020    LABA1C 8.4 (H) 06/17/2020     Lab Results   Component Value Date    LABMICR 26.3 (H) 06/27/2017    LDLCALC 78 10/01/2020    CREATININE 0.7 06/17/2020       ASSESSMENT/PLAN:     Diagnosis Orders   1. Diabetes mellitus type 2 in obese (HCC)  POCT glycosylated hemoglobin (Hb A1C)   2. Vitamin D deficiency     3. Class 1 obesity due to excess calories with serious comorbidity and body mass index (BMI) of 33.0 to 33.9 in adult     4. Pure hypercholesterolemia     5. Essential hypertension       A1c at 8.3 will add glipizide back to his regimen. We will change Zocor to 10 mg once daily instead of 5 mg 2 tabs once daily. We will also change BP medication to losartan HCTZ 5012-1/2 twice daily. He does have a BP cuff at home and will start checking and call me in 2 weeks for recheck. Otherwise he should fast for his next appointment as we will complete blood work. Patient is agreeable to the above plan.   He will return in 3 months for reevaluation    Problem list reviewed andsimplified/updated  HM reviewed today and counseled as appropriate    Call or go to ED immediately if symptoms worsen or persist.  Future Appointments   Date Time Provider Liam Gore   3/24/2021 1:30 PM BREANNE Chiang CNP B[facility] ORTHO [facility]   6/15/2021  7:30 AM DO Fani Longo PC [facility]     Or sooner if necessary. Educational materials and/or homeexercises printed for patient's review and were included in patient instructions on his/her After Visit Summary and given to patient at the end of visit.       Counseled regarding above diagnosis, including possible risks and complications,  especially if left uncontrolled.     Counseled regarding the possible side effects, risks, benefits and alternatives to treatment; patient and/or guardian verbalizes understanding, agrees,feels comfortable with and wishes to proceed with above treatment plan.     Advised patient to call Alize Fieldsner new medication issues, and read all Rx info from pharmacy to assure aware of all possible risks and side effects of medication before taking.     Reviewed age and gender appropriate health screening exams and vaccinations. Advised patient regarding importance of keeping up with recommended health maintenance and toschedule as soon as possible if overdue, as this is important in assessing for undiagnosed pathology, especially cancer, as well as protecting against potentially harmful/life threatening disease.          Patient and/or guardian verbalizes understanding and agrees with above counseling, assessment and plan.     All questions answered. Chad Miller DO  3/11/21    NOTE: This report was transcribed using voice recognition software.  Every effort was made to ensure accuracy; however, inadvertent computerized transcription errors may be present

## 2021-03-19 ENCOUNTER — OFFICE VISIT (OUTPATIENT)
Dept: ORTHOPEDIC SURGERY | Age: 56
End: 2021-03-19
Payer: COMMERCIAL

## 2021-03-19 VITALS — HEIGHT: 75 IN | BODY MASS INDEX: 32.58 KG/M2 | WEIGHT: 262 LBS

## 2021-03-19 DIAGNOSIS — M75.111 INCOMPLETE TEAR OF RIGHT ROTATOR CUFF, UNSPECIFIED WHETHER TRAUMATIC: Primary | ICD-10-CM

## 2021-03-19 PROCEDURE — 99213 OFFICE O/P EST LOW 20 MIN: CPT | Performed by: ORTHOPAEDIC SURGERY

## 2021-03-19 NOTE — PROGRESS NOTES
Follow Up Visit     Angelina Amador. returns today for follow up visit regarding Right shoulder partial rotator cuff tear. Treatment thus far has included Cortisone injection in right shoulder on 1/25/2021 performed last visit, with good improvement; physical therapy, he states that painful over biceps area. He reports symptoms are improved. REVIEW OF SYSTEMS:     General: Negative Fever, chills, fatigue   Cardiovascular: Negative chest pain, palpitations  Respiratory: Equal chest expansion, negative shortness of breath   Skin: Negative rash, open wounds  Psych: Normal affect, mood stable  Neurologic: sensation grossly intact in extremities   Musculoskeletal: See HPI    Physical Exam:     Height: 6' 3\" (1.905 m), Weight: 262 lb (118.8 kg) (per pt)    General: Alert and oriented x3, no acute distress  Cardiovascular/pulmonary: No labored breathing, peripheral perfusion intact  Musculoskeletal:    Right shoulder exam range of motion 170/50/T10. Nontender to palpation no swelling or deformity visualized. Positive speeds exam for pain and weakness. Belly press, Jobes, Waller's exams are intact. Controlled Substances Monitoring:      Imaging:  No new imaging obtained today. Previous imaging of his right shoulder was reviewed. MRI shows abnormality to the biceps tendon and subscapularis possible partial tear      Assessment: Right shoulder partial rotator cuff tear      Plan: Today we discussed his right shoulder. Patient reports that he has had good improvement with physical therapy and pain has improved following cortisone injection in January. He does have some tightness in his biceps with range of motion. On exam he has positive pain and weakness on his speeds exam.  We discussed potential cortisone injection over the biceps tendon sheath today as well as surgical intervention specifically a right shoulder arthroscopy rotator cuff repair versus debridement possible biceps tenotomy versus tenodesis. Patient wishes to refrain from surgery and injection at this time. He will continue with home exercises. He will follow up in 3 months. Rosalie Cortez, [address] [facility]  Orthopedic Surgery   03/19/21  11:27 AM    Staff Addendum    I have seen and evaluated the patient and agree with the assessment and plan as documented by Rosalie Cortez CNP. I have performed the key components of the history and physical examination and concur with the findings and plan, and have made changes where appropriate/necessary.           Deepthi Couch MD  [address]

## 2021-03-20 ENCOUNTER — IMMUNIZATION (OUTPATIENT)
Dept: PRIMARY CARE CLINIC | Age: 56
End: 2021-03-20
Payer: COMMERCIAL

## 2021-03-20 PROCEDURE — 91300 COVID-19, PFIZER VACCINE 30MCG/0.3ML DOSE: CPT | Performed by: INTERNAL MEDICINE

## 2021-03-20 PROCEDURE — 0001A COVID-19, PFIZER VACCINE 30MCG/0.3ML DOSE: CPT | Performed by: INTERNAL MEDICINE

## 2021-03-25 NOTE — PROGRESS NOTES
[address] [facility]                Phone: [phone]   Fax: [phone]    To:  Dr. Adeline Mckeon       From: Alanis Ontiveros     Patient: Dane Jorge. : 1965  Diagnosis:       Date: 3/25/2021  Treatment Diagnosis:  acute R shoulder pain     Physical Therapy Discharge Note    Total Visits to date:     10 Cancels/No-shows to date:  1 cancellation    Plan of Care/Treatment to date:  [x] Therapeutic Exercise    [x] Modalities:  [x] Therapeutic Activity     [] Ultrasound  [x] Electrical Stimulation  [] Gait Training      [] Cervical Traction   [] Lumbar Traction  [] Neuromuscular Re-education  [x] Cold/hotpack [] Iontophoresis  [x] Instruction in HEP      Other:  [] Manual Therapy       []    [] Aquatic Therapy       []                            Progress towards goals:  pt reached all stated functional LTG's for therapy and was I with HEP; pain across shoulder persisted with all movements        Goal Status:  [] Achieved [x] Partially Achieved  [] Not Achieved     Patient Status: [] Continue per initial plan of Care     [x] Patient now discharged to [facility] for home management of condition     [] Additional visits requested, Please re-certify for additional visits:          Electronically signed by: ARTURO Baker     If you have any questions or concerns, please don't hesitate to call.   Thank you for your referral.

## 2021-04-15 ENCOUNTER — IMMUNIZATION (OUTPATIENT)
Dept: PRIMARY CARE CLINIC | Age: 56
End: 2021-04-15
Payer: COMMERCIAL

## 2021-04-15 PROCEDURE — 91300 COVID-19, PFIZER VACCINE 30MCG/0.3ML DOSE: CPT | Performed by: NURSE PRACTITIONER

## 2021-04-15 PROCEDURE — 0002A COVID-19, PFIZER VACCINE 30MCG/0.3ML DOSE: CPT | Performed by: NURSE PRACTITIONER

## 2021-06-04 RX ORDER — METFORMIN HYDROCHLORIDE 500 MG/1
1000 TABLET, EXTENDED RELEASE ORAL 2 TIMES DAILY
Qty: 360 TABLET | Refills: 1 | Status: SHIPPED
Start: 2021-06-04 | End: 2022-01-21 | Stop reason: SDUPTHER

## 2021-06-04 RX ORDER — MONTELUKAST SODIUM 10 MG/1
TABLET ORAL
Qty: 90 TABLET | Refills: 1 | Status: SHIPPED
Start: 2021-06-04 | End: 2021-12-07

## 2021-06-15 ENCOUNTER — OFFICE VISIT (OUTPATIENT)
Dept: FAMILY MEDICINE CLINIC | Age: 56
End: 2021-06-15
Payer: COMMERCIAL

## 2021-06-15 VITALS
WEIGHT: 258 LBS | DIASTOLIC BLOOD PRESSURE: 84 MMHG | HEART RATE: 83 BPM | HEIGHT: 75 IN | BODY MASS INDEX: 32.08 KG/M2 | SYSTOLIC BLOOD PRESSURE: 132 MMHG | OXYGEN SATURATION: 97 % | RESPIRATION RATE: 18 BRPM

## 2021-06-15 DIAGNOSIS — I10 ESSENTIAL HYPERTENSION: ICD-10-CM

## 2021-06-15 DIAGNOSIS — E55.9 VITAMIN D DEFICIENCY: ICD-10-CM

## 2021-06-15 DIAGNOSIS — E66.9 DIABETES MELLITUS TYPE 2 IN OBESE (HCC): ICD-10-CM

## 2021-06-15 DIAGNOSIS — E78.00 PURE HYPERCHOLESTEROLEMIA: ICD-10-CM

## 2021-06-15 DIAGNOSIS — E66.9 DIABETES MELLITUS TYPE 2 IN OBESE (HCC): Primary | ICD-10-CM

## 2021-06-15 DIAGNOSIS — E11.69 DIABETES MELLITUS TYPE 2 IN OBESE (HCC): ICD-10-CM

## 2021-06-15 DIAGNOSIS — E11.69 DIABETES MELLITUS TYPE 2 IN OBESE (HCC): Primary | ICD-10-CM

## 2021-06-15 LAB
ALBUMIN SERPL-MCNC: 4.4 G/DL (ref 3.5–5.2)
ALP BLD-CCNC: 50 U/L (ref 40–129)
ALT SERPL-CCNC: 18 U/L (ref 0–40)
ANION GAP SERPL CALCULATED.3IONS-SCNC: 16 MMOL/L (ref 7–16)
AST SERPL-CCNC: 23 U/L (ref 0–39)
BASOPHILS ABSOLUTE: 0.07 E9/L (ref 0–0.2)
BASOPHILS RELATIVE PERCENT: 0.9 % (ref 0–2)
BILIRUB SERPL-MCNC: 0.5 MG/DL (ref 0–1.2)
BUN BLDV-MCNC: 13 MG/DL (ref 6–20)
CALCIUM SERPL-MCNC: 9.6 MG/DL (ref 8.6–10.2)
CHLORIDE BLD-SCNC: 100 MMOL/L (ref 98–107)
CHOLESTEROL, TOTAL: 153 MG/DL (ref 0–199)
CO2: 24 MMOL/L (ref 22–29)
CREAT SERPL-MCNC: 0.7 MG/DL (ref 0.7–1.2)
EOSINOPHILS ABSOLUTE: 0.38 E9/L (ref 0.05–0.5)
EOSINOPHILS RELATIVE PERCENT: 4.7 % (ref 0–6)
GFR AFRICAN AMERICAN: >60
GFR NON-AFRICAN AMERICAN: >60 ML/MIN/1.73
GLUCOSE BLD-MCNC: 183 MG/DL (ref 74–99)
HBA1C MFR BLD: 7.8 %
HCT VFR BLD CALC: 49.4 % (ref 37–54)
HDLC SERPL-MCNC: 33 MG/DL
HEMOGLOBIN: 16.7 G/DL (ref 12.5–16.5)
IMMATURE GRANULOCYTES #: 0.09 E9/L
IMMATURE GRANULOCYTES %: 1.1 % (ref 0–5)
LDL CHOLESTEROL CALCULATED: 87 MG/DL (ref 0–99)
LYMPHOCYTES ABSOLUTE: 1.75 E9/L (ref 1.5–4)
LYMPHOCYTES RELATIVE PERCENT: 21.6 % (ref 20–42)
MCH RBC QN AUTO: 32.6 PG (ref 26–35)
MCHC RBC AUTO-ENTMCNC: 33.8 % (ref 32–34.5)
MCV RBC AUTO: 96.5 FL (ref 80–99.9)
MONOCYTES ABSOLUTE: 0.52 E9/L (ref 0.1–0.95)
MONOCYTES RELATIVE PERCENT: 6.4 % (ref 2–12)
NEUTROPHILS ABSOLUTE: 5.29 E9/L (ref 1.8–7.3)
NEUTROPHILS RELATIVE PERCENT: 65.3 % (ref 43–80)
PDW BLD-RTO: 12.5 FL (ref 11.5–15)
PLATELET # BLD: 280 E9/L (ref 130–450)
PMV BLD AUTO: 11.3 FL (ref 7–12)
POTASSIUM SERPL-SCNC: 4.7 MMOL/L (ref 3.5–5)
RBC # BLD: 5.12 E12/L (ref 3.8–5.8)
SODIUM BLD-SCNC: 140 MMOL/L (ref 132–146)
TOTAL PROTEIN: 7.7 G/DL (ref 6.4–8.3)
TRIGL SERPL-MCNC: 163 MG/DL (ref 0–149)
VITAMIN D 25-HYDROXY: 40 NG/ML (ref 30–100)
VLDLC SERPL CALC-MCNC: 33 MG/DL
WBC # BLD: 8.1 E9/L (ref 4.5–11.5)

## 2021-06-15 PROCEDURE — 99214 OFFICE O/P EST MOD 30 MIN: CPT | Performed by: FAMILY MEDICINE

## 2021-06-15 PROCEDURE — 3051F HG A1C>EQUAL 7.0%<8.0%: CPT | Performed by: FAMILY MEDICINE

## 2021-06-15 PROCEDURE — 83036 HEMOGLOBIN GLYCOSYLATED A1C: CPT | Performed by: FAMILY MEDICINE

## 2021-06-15 SDOH — ECONOMIC STABILITY: FOOD INSECURITY: WITHIN THE PAST 12 MONTHS, THE FOOD YOU BOUGHT JUST DIDN'T LAST AND YOU DIDN'T HAVE MONEY TO GET MORE.: NEVER TRUE

## 2021-06-15 SDOH — ECONOMIC STABILITY: FOOD INSECURITY: WITHIN THE PAST 12 MONTHS, YOU WORRIED THAT YOUR FOOD WOULD RUN OUT BEFORE YOU GOT MONEY TO BUY MORE.: NEVER TRUE

## 2021-06-15 ASSESSMENT — ENCOUNTER SYMPTOMS
RHINORRHEA: 0
BACK PAIN: 0
EYE ITCHING: 0
CONSTIPATION: 0
SORE THROAT: 0
COUGH: 0
CHEST TIGHTNESS: 0
DIARRHEA: 0
ABDOMINAL PAIN: 0
EYE PAIN: 0
SHORTNESS OF BREATH: 0
NAUSEA: 0

## 2021-06-15 ASSESSMENT — SOCIAL DETERMINANTS OF HEALTH (SDOH): HOW HARD IS IT FOR YOU TO PAY FOR THE VERY BASICS LIKE FOOD, HOUSING, MEDICAL CARE, AND HEATING?: NOT HARD AT ALL

## 2021-11-16 RX ORDER — GLIPIZIDE 5 MG/1
TABLET ORAL
Qty: 180 TABLET | Refills: 1 | Status: SHIPPED
Start: 2021-11-16 | End: 2022-04-14

## 2021-11-16 RX ORDER — LOSARTAN POTASSIUM AND HYDROCHLOROTHIAZIDE 12.5; 5 MG/1; MG/1
TABLET ORAL
Qty: 180 TABLET | Refills: 1 | Status: SHIPPED
Start: 2021-11-16 | End: 2022-04-14 | Stop reason: ALTCHOICE

## 2021-11-16 RX ORDER — SIMVASTATIN 10 MG
TABLET ORAL
Qty: 90 TABLET | Refills: 1 | Status: SHIPPED | OUTPATIENT
Start: 2021-11-16 | End: 2022-08-03 | Stop reason: SDUPTHER

## 2021-12-03 ENCOUNTER — IMMUNIZATION (OUTPATIENT)
Dept: PRIMARY CARE CLINIC | Age: 56
End: 2021-12-03
Payer: COMMERCIAL

## 2021-12-03 PROCEDURE — 0004A COVID-19, PFIZER VACCINE 30MCG/0.3ML DOSE: CPT | Performed by: NURSE PRACTITIONER

## 2021-12-03 PROCEDURE — 91300 COVID-19, PFIZER VACCINE 30MCG/0.3ML DOSE: CPT | Performed by: NURSE PRACTITIONER

## 2021-12-07 RX ORDER — MONTELUKAST SODIUM 10 MG/1
TABLET ORAL
Qty: 90 TABLET | Refills: 1 | Status: SHIPPED
Start: 2021-12-07 | End: 2022-06-13

## 2021-12-09 ENCOUNTER — OFFICE VISIT (OUTPATIENT)
Dept: FAMILY MEDICINE CLINIC | Age: 56
End: 2021-12-09
Payer: COMMERCIAL

## 2021-12-09 VITALS
WEIGHT: 271.4 LBS | OXYGEN SATURATION: 95 % | TEMPERATURE: 98 F | DIASTOLIC BLOOD PRESSURE: 88 MMHG | RESPIRATION RATE: 20 BRPM | BODY MASS INDEX: 33.74 KG/M2 | HEART RATE: 86 BPM | SYSTOLIC BLOOD PRESSURE: 135 MMHG | HEIGHT: 75 IN

## 2021-12-09 DIAGNOSIS — E11.69 DIABETES MELLITUS TYPE 2 IN OBESE (HCC): Primary | ICD-10-CM

## 2021-12-09 DIAGNOSIS — E66.9 DIABETES MELLITUS TYPE 2 IN OBESE (HCC): Primary | ICD-10-CM

## 2021-12-09 DIAGNOSIS — I10 PRIMARY HYPERTENSION: ICD-10-CM

## 2021-12-09 DIAGNOSIS — E78.00 PURE HYPERCHOLESTEROLEMIA: ICD-10-CM

## 2021-12-09 DIAGNOSIS — E55.9 VITAMIN D DEFICIENCY: ICD-10-CM

## 2021-12-09 PROCEDURE — 3051F HG A1C>EQUAL 7.0%<8.0%: CPT | Performed by: FAMILY MEDICINE

## 2021-12-09 PROCEDURE — 99214 OFFICE O/P EST MOD 30 MIN: CPT | Performed by: FAMILY MEDICINE

## 2021-12-09 PROCEDURE — 83036 HEMOGLOBIN GLYCOSYLATED A1C: CPT | Performed by: FAMILY MEDICINE

## 2021-12-09 ASSESSMENT — ENCOUNTER SYMPTOMS
NAUSEA: 0
CHEST TIGHTNESS: 0
RHINORRHEA: 0
SORE THROAT: 0
EYE ITCHING: 0
ABDOMINAL PAIN: 0
COUGH: 0
SHORTNESS OF BREATH: 0
DIARRHEA: 0
CONSTIPATION: 0
BACK PAIN: 0
EYE PAIN: 0

## 2021-12-09 NOTE — PROGRESS NOTES
Michael Mancuso : 1965    Chief Complaint:     Chief Complaint   Patient presents with    Diabetes    Hypertension       HPI  Michael Mancuso 49 y.o. presents for   Chief Complaint   Patient presents with    Diabetes    Hypertension     Treatment Adherence:   Medication compliance:  compliant most of the time  Diet compliance:  noncompliant: admits to not watching diet recently  Weight trend: stable  Current exercise: no regular exercise  Barriers: stress    Diabetes Mellitus Type 2: Current symptoms/problems include none. Home blood sugar records: patient does not test  Any episodes of hypoglycemia? no  Eye exam current (within one year): yes  Tobacco history: He  reports that he has never smoked. He has never used smokeless tobacco.   Daily Aspirin? Yes    Hypertension:  Home blood pressure monitoring: No.  He is adherent to a low sodium diet. Patient denies chest pain and shortness of breath. Antihypertensive medication side effects: no medication side effects noted. Use of agents associated with hypertension: none. Hyperlipidemia:  No new myalgias or GI upset on simvastatin    Lab Results   Component Value Date    LABA1C 7.8 06/15/2021    LABA1C 8.3 2021    LABA1C 8.1 (H) 10/01/2020     Lab Results   Component Value Date    LABMICR 26.3 (H) 2017    CREATININE 0.7 06/15/2021     Lab Results   Component Value Date    ALT 18 06/15/2021    AST 23 06/15/2021     Lab Results   Component Value Date    CHOL 153 06/15/2021    TRIG 163 (H) 06/15/2021    HDL 33 06/15/2021    LDLCALC 87 06/15/2021          All questions were answered to patients satisfaction.     Past Medical History:   Diagnosis Date    Diabetes mellitus ([address]     Encounter for screening colonoscopy     Hyperlipemia     Hypertension        No Known Allergies    Health Maintenance Due   Topic Date Due    Hepatitis C screen  Never done    Pneumococcal 0-64 years Vaccine (1 of 2 - PPSV23) Never done    HIV screen Never done    Hepatitis B vaccine (1 of 3 - Risk 3-dose series) Never done    DTaP/Tdap/Td vaccine (1 - Tdap) Never done    Shingles Vaccine (1 of 2) Never done    Diabetic microalbuminuria test  06/24/2021    Flu vaccine (1) Never done    Diabetic foot exam  10/06/2021    Diabetic retinal exam  12/18/2021         REVIEW OF SYSTEMS  Review of Systems   Constitutional: Negative for chills, fatigue and fever. HENT: Negative for congestion, ear pain, postnasal drip, rhinorrhea and sore throat. Eyes: Negative for pain and itching. Respiratory: Negative for cough, chest tightness and shortness of breath. Cardiovascular: Negative for chest pain and leg swelling. Gastrointestinal: Negative for abdominal pain, constipation, diarrhea and nausea. Endocrine: Negative for heat intolerance. Genitourinary: Negative for flank pain, frequency and urgency. Musculoskeletal: Negative for arthralgias and back pain. Skin: Negative for pallor and rash. Allergic/Immunologic: Negative for environmental allergies. Neurological: Negative for dizziness, numbness and headaches. Hematological: Does not bruise/bleed easily. Psychiatric/Behavioral: Negative for agitation, behavioral problems and hallucinations. PHYSICAL EXAM  /88 (Site: Right Upper Arm, Position: Sitting, Cuff Size: Large Adult)   Pulse 86   Temp 98 °F (36.7 °C) (Temporal)   Resp 20   Ht 6' 3\" (1.905 m)   Wt 271 lb 6.4 oz (123.1 kg)   SpO2 95%   BMI 33.92 kg/m²   Physical Exam  Vitals and nursing note reviewed. Constitutional:       General: He is not in acute distress. Appearance: He is well-developed. He is not diaphoretic. HENT:      Head: Normocephalic and atraumatic. Right Ear: External ear normal.      Left Ear: External ear normal.      Nose: Nose normal.      Mouth/Throat:      Pharynx: No oropharyngeal exudate. Eyes:      General:         Right eye: No discharge. Left eye: No discharge. Conjunctiva/sclera: Conjunctivae normal.   Neck:      Thyroid: No thyromegaly. Cardiovascular:      Rate and Rhythm: Normal rate and regular rhythm. Heart sounds: Normal heart sounds. No murmur heard. Pulmonary:      Effort: Pulmonary effort is normal.      Breath sounds: Normal breath sounds. No rales. Abdominal:      Palpations: Abdomen is soft. Tenderness: There is no abdominal tenderness. Musculoskeletal:         General: No tenderness. Normal range of motion. Cervical back: Normal range of motion and neck supple. Lymphadenopathy:      Cervical: No cervical adenopathy. Skin:     General: Skin is warm and dry. Findings: No erythema or rash. Neurological:      Mental Status: He is alert and oriented to person, place, and time. Coordination: Coordination normal.      Deep Tendon Reflexes: Reflexes are normal and symmetric. Psychiatric:         Behavior: Behavior normal.         Thought Content: Thought content normal.         Judgment: Judgment normal.              Laboratory:   All laboratory and radiology results have been personally reviewed by myself    Lab Results   Component Value Date     06/15/2021    K 4.7 06/15/2021     06/15/2021    CO2 24 06/15/2021    BUN 13 06/15/2021    CREATININE 0.7 06/15/2021    PROT 7.7 06/15/2021    LABALBU 4.4 06/15/2021    LABALBU 4.4 05/15/2012    CALCIUM 9.6 06/15/2021    GFRAA >60 06/15/2021    LABGLOM >60 06/15/2021    GLUCOSE 183 06/15/2021    GLUCOSE 147 05/15/2012    AST 23 06/15/2021    ALT 18 06/15/2021    ALKPHOS 50 06/15/2021    BILITOT 0.5 06/15/2021    TSH 1.960 06/27/2017    VITD25 40 06/15/2021    CHOL 153 06/15/2021    TRIG 163 06/15/2021    HDL 33 06/15/2021    LDLCALC 87 06/15/2021    LABA1C 7.8 06/15/2021        Lab Results   Component Value Date    CHOL 153 06/15/2021     Lab Results   Component Value Date    TRIG 163 (H) 06/15/2021     Lab Results   Component Value Date    HDL 33 06/15/2021     Lab Results   Component Value Date    LDLCALC 87 06/15/2021    LDLCHOLESTEROL 82 10/29/2015       Lab Results   Component Value Date    LABA1C 7.8 06/15/2021     Lab Results   Component Value Date    LABMICR 26.3 (H) 06/27/2017    LDLCALC 87 06/15/2021    CREATININE 0.7 06/15/2021       ASSESSMENT/PLAN:    1. Diabetes mellitus type 2 in obese (HCC)  -     POCT glycosylated hemoglobin (Hb A1C)  2. Primary hypertension  3. Vitamin D deficiency  4. Pure hypercholesterolemia     A1c slightly elevated from previous. We did discuss in detail today. Patient is not watching his diet and will start trying to watch his diet. We will have him return in 4 months for reevaluation if elevated at that point time would increase medication. Patient agreeable. BP under good control will continue current medication which was reviewed in detail today. On statin doing well we will continue. No other change made at this time follow-up in 4 months or sooner if needed he will give us urine sample at that point time. Problem list reviewed andsimplified/updated  HM reviewed today and counseled as appropriate    Call or go to ED immediately if symptoms worsen or persist.  Future Appointments   Date Time Provider Liam Gore   4/14/2022  7:00 AM Kar 5, DO [facility]     Or sooner if necessary. Educational materials and/or homeexercises printed for patient's review and were included in patient instructions on his/her After Visit Summary and given to patient at the end of visit. Counseled regarding above diagnosis, including possible risks and complications,  especially if left uncontrolled. Counseled regarding the possible side effects, risks, benefits and alternatives to treatment; patient and/or guardian verbalizes understanding, agrees,feels comfortable with and wishes to proceed with above treatment plan.      Advised patient to call Holly Pratt new medication issues, and read all Rx info from pharmacy to assure aware of all possible risks and side effects of medication before taking. Reviewed age and gender appropriate health screening exams and vaccinations. Advised patient regarding importance of keeping up with recommended health maintenance and toschedule as soon as possible if overdue, as this is important in assessing for undiagnosed pathology, especially cancer, as well as protecting against potentially harmful/life threatening disease. and/or guardian verbalizes understanding and agrees with above counseling, assessment and plan. All questions answered. Pati Hair DO  12/9/21    NOTE: This report was transcribed using voice recognition software.  Every effort was made to ensure accuracy; however, inadvertent computerized transcription errors may be present

## 2021-12-14 LAB — HBA1C MFR BLD: 8.1 %

## 2021-12-27 ENCOUNTER — OFFICE VISIT (OUTPATIENT)
Dept: PRIMARY CARE CLINIC | Age: 56
End: 2021-12-27
Payer: COMMERCIAL

## 2021-12-27 VITALS
WEIGHT: 271 LBS | RESPIRATION RATE: 20 BRPM | HEART RATE: 83 BPM | DIASTOLIC BLOOD PRESSURE: 96 MMHG | SYSTOLIC BLOOD PRESSURE: 149 MMHG | HEIGHT: 75 IN | BODY MASS INDEX: 33.69 KG/M2 | TEMPERATURE: 97.7 F

## 2021-12-27 DIAGNOSIS — R05.9 COUGH: Primary | ICD-10-CM

## 2021-12-27 DIAGNOSIS — R05.9 COUGH: ICD-10-CM

## 2021-12-27 LAB
INFLUENZA A ANTIBODY: NEGATIVE
INFLUENZA B ANTIBODY: NEGATIVE
Lab: NORMAL
PERFORMING INSTRUMENT: NORMAL
QC PASS/FAIL: NORMAL
SARS-COV-2, POC: NORMAL

## 2021-12-27 PROCEDURE — 87804 INFLUENZA ASSAY W/OPTIC: CPT | Performed by: FAMILY MEDICINE

## 2021-12-27 PROCEDURE — 87426 SARSCOV CORONAVIRUS AG IA: CPT | Performed by: FAMILY MEDICINE

## 2021-12-27 PROCEDURE — 99213 OFFICE O/P EST LOW 20 MIN: CPT | Performed by: FAMILY MEDICINE

## 2021-12-27 NOTE — PROGRESS NOTES
Taras Favre. : 1965    Chief Complaint:     Chief Complaint   Patient presents with    Concern For COVID-19       HPI  Taras Favre. 49 y.o. presents for   Chief Complaint   Patient presents with    Concern For COVID-19     Patient presents for cough, congestion for the past few days. His daughter tested positive today. He states he feels well otherwise. He is just slightly fatigued. All symptoms started on  day. All questions were answered to patients satisfaction. Past Medical History:   Diagnosis Date    Diabetes mellitus ([facility] [address]     Encounter for screening colonoscopy     Hyperlipemia     Hypertension        No Known Allergies    Health Maintenance Due   Topic Date Due    Hepatitis C screen  Never done    Pneumococcal 0-64 years Vaccine (1 of 2 - PPSV23) Never done    HIV screen  Never done    Hepatitis B vaccine (1 of 3 - Risk 3-dose series) Never done    DTaP/Tdap/Td vaccine (1 - Tdap) Never done    Shingles Vaccine (1 of 2) Never done    Diabetic retinal exam  2020    Diabetic microalbuminuria test  2021    Flu vaccine (1) Never done    Diabetic foot exam  10/06/2021         REVIEW OF SYSTEMS  Review of Systems   Constitutional: Positive for fatigue. Negative for chills and fever. HENT: Positive for congestion. Negative for ear pain, postnasal drip, sinus pressure, sinus pain and sore throat. Respiratory: Positive for cough. Negative for shortness of breath. Cardiovascular: Negative for chest pain. Gastrointestinal: Negative for abdominal pain and nausea. Genitourinary: Negative for frequency. Musculoskeletal: Negative for arthralgias and myalgias. Skin: Negative for rash. Neurological: Negative for dizziness. Hematological: Negative for adenopathy. Psychiatric/Behavioral: Negative for decreased concentration and sleep disturbance.        PHYSICAL EXAM  BP (!) 149/96   Pulse 83   Temp 97.7 °F (36.5 °C)   Resp 20   Ht 6' 3\" (1.905 m)   Wt 271 lb (122.9 kg)   BMI 33.87 kg/m²   Physical Exam  Vitals and nursing note reviewed. Constitutional:       Appearance: Normal appearance. He is normal weight. HENT:      Head: Normocephalic and atraumatic. Right Ear: Tympanic membrane, ear canal and external ear normal.      Left Ear: Tympanic membrane, ear canal and external ear normal.      Nose: Congestion present. Comments: PND     Mouth/Throat:      Pharynx: Posterior oropharyngeal erythema present. Eyes:      Extraocular Movements: Extraocular movements intact. Conjunctiva/sclera: Conjunctivae normal.   Cardiovascular:      Rate and Rhythm: Normal rate and regular rhythm. Pulses: Normal pulses. Pulmonary:      Effort: Pulmonary effort is normal.      Breath sounds: Normal breath sounds. No wheezing, rhonchi or rales. Chest:      Chest wall: No tenderness. Abdominal:      Palpations: Abdomen is soft. Tenderness: There is no abdominal tenderness. Musculoskeletal:      Cervical back: Normal range of motion. Lymphadenopathy:      Cervical: No cervical adenopathy. Skin:     General: Skin is warm and dry. Neurological:      General: No focal deficit present. Mental Status: He is alert and oriented to person, place, and time. Psychiatric:         Mood and Affect: Mood normal.         Behavior: Behavior normal.              Laboratory:   All laboratory and radiology results have been personally reviewed by myself    Lab Results   Component Value Date     06/15/2021    K 4.7 06/15/2021     06/15/2021    CO2 24 06/15/2021    BUN 13 06/15/2021    CREATININE 0.7 06/15/2021    PROT 7.7 06/15/2021    LABALBU 4.4 06/15/2021    LABALBU 4.4 05/15/2012    CALCIUM 9.6 06/15/2021    GFRAA >60 06/15/2021    LABGLOM >60 06/15/2021    GLUCOSE 183 06/15/2021    GLUCOSE 147 05/15/2012    AST 23 06/15/2021    ALT 18 06/15/2021    ALKPHOS 50 06/15/2021    BILITOT 0.5 06/15/2021    TSH 1.960 06/27/2017 VITD25 40 06/15/2021    CHOL 153 06/15/2021    TRIG 163 06/15/2021    HDL 33 06/15/2021    LDLCALC 87 06/15/2021    LABA1C 8.1 12/14/2021        Lab Results   Component Value Date    CHOL 153 06/15/2021     Lab Results   Component Value Date    TRIG 163 (H) 06/15/2021     Lab Results   Component Value Date    HDL 33 06/15/2021     Lab Results   Component Value Date    LDLCALC 87 06/15/2021    LDLCHOLESTEROL 82 10/29/2015       Lab Results   Component Value Date    LABA1C 8.1 12/14/2021     Lab Results   Component Value Date    LABMICR 26.3 (H) 06/27/2017    LDLCALC 87 06/15/2021    CREATININE 0.7 06/15/2021       ASSESSMENT/PLAN:    1. Cough  -     POCT COVID-19, Antigen  -     POCT Influenza A/B  -     COVID-19 Ambulatory; Future     Rapid Covid and flu negative. COVID-19 swab obtained and pending, will call with results once available. Advised strict self-quarantine at home in the interim. Work excuse provided to patient today. Increase fluids. Rest as able, but take opportunities to ambulate frequently. Symptomatic relief discussed including Tylenol as directed prn pain/fever. Schedule f/u with PCP in 7-10 days if symptoms persist. ED sooner if symptoms worsen or change. ED immediately with high or refractory fever, progressive SOB, dyspnea, CP, calf pain/swelling, shaking chills, vomiting, abdominal pain, lethargy, flank pain, or decreased urinary output. Pt verbalizes understanding and is in agreement with plan of care. All questions answered. Problem list reviewed andsimplified/updated  HM reviewed today and counseled as appropriate    Call or go to ED immediately if symptoms worsen or persist.  Future Appointments   Date Time Provider Liam Gore   4/14/2022  7:00 AM Kar 5, DO [facility]     Or sooner if necessary.       Educational materials and/or homeexercises printed for patient's review and were included in patient instructions on his/her After Visit Summary and given to patient at the end of visit. Counseled regarding above diagnosis, including possible risks and complications,  especially if left uncontrolled. Counseled regarding the possible side effects, risks, benefits and alternatives to treatment; patient and/or guardian verbalizes understanding, agrees,feels comfortable with and wishes to proceed with above treatment plan. Advised patient to call Est[facility] Hoof new medication issues, and read all Rx info from pharmacy to assure aware of all possible risks and side effects of medication before taking. Reviewed age and gender appropriate health screening exams and vaccinations. Advised patient regarding importance of keeping up with recommended health maintenance and toschedule as soon as possible if overdue, as this is important in assessing for undiagnosed pathology, especially cancer, as well as protecting against potentially harmful/life threatening disease. and/or guardian verbalizes understanding and agrees with above counseling, assessment and plan. All questions answered. Kavin Rawls DO  12/27/21    NOTE: This report was transcribed using voice recognition software.  Every effort was made to ensure accuracy; however, inadvertent computerized transcription errors may be present

## 2021-12-28 ASSESSMENT — ENCOUNTER SYMPTOMS
COUGH: 1
SINUS PAIN: 0
SORE THROAT: 0
ABDOMINAL PAIN: 0
NAUSEA: 0
SHORTNESS OF BREATH: 0
SINUS PRESSURE: 0

## 2021-12-29 LAB
SARS-COV-2: NOT DETECTED
SOURCE: NORMAL

## 2022-01-21 ENCOUNTER — TELEPHONE (OUTPATIENT)
Dept: FAMILY MEDICINE CLINIC | Age: 57
End: 2022-01-21

## 2022-01-21 RX ORDER — METFORMIN HYDROCHLORIDE 500 MG/1
1000 TABLET, EXTENDED RELEASE ORAL 2 TIMES DAILY
Qty: 360 TABLET | Refills: 1 | Status: SHIPPED
Start: 2022-01-21 | End: 2022-08-03 | Stop reason: SDUPTHER

## 2022-01-31 ENCOUNTER — TELEPHONE (OUTPATIENT)
Dept: FAMILY MEDICINE CLINIC | Age: 57
End: 2022-01-31

## 2022-03-02 ENCOUNTER — OFFICE VISIT (OUTPATIENT)
Dept: ORTHOPEDIC SURGERY | Age: 57
End: 2022-03-02
Payer: COMMERCIAL

## 2022-03-02 VITALS — WEIGHT: 270 LBS | HEIGHT: 75 IN | BODY MASS INDEX: 33.57 KG/M2

## 2022-03-02 DIAGNOSIS — M54.12 CERVICAL RADICULOPATHY: Primary | ICD-10-CM

## 2022-03-02 PROCEDURE — 99213 OFFICE O/P EST LOW 20 MIN: CPT | Performed by: ORTHOPAEDIC SURGERY

## 2022-03-02 NOTE — PROGRESS NOTES
Follow Up Visit     Celia Bermudez. returns today for follow up visit regarding Right shoulder partial rotator cuff tear, last seen on 3/19/2021. Treatment thus far has included Cortisone injection in right shoulder on 1/25/2021; physical therapy, and HEP. He reports symptoms are {Improved/diminished/unchanged:88126}.      Physical Exam:     No data recorded    General: Alert and oriented x3, no acute distress  Cardiovascular/pulmonary: No labored breathing, peripheral perfusion intact  Musculoskeletal:    ***    Controlled Substances Monitoring:      Imaging:  ***      Assessment: ***      Plan:   ***    Becky Lovelace MD  Orthopaedic Surgery   3/2/22  1:41 PM

## 2022-03-02 NOTE — PROGRESS NOTES
Follow Up Visit     Corie Iniguez returns today for follow up visit regarding Right shoulder partial rotator cuff tear, last seen on 3/19/2021. Treatment thus far has included Cortisone injection in right shoulder on 1/25/2021; physical therapy, and HEP. He reports symptoms were initially greatly improved with corticosteroid injection however approximately 5 to 6 months after he slowly began to have an onset of pain similar to his previous symptoms. Today is also complaining of some radicular pain into the left thumb and index fingers with neck motion. He states that he first noticed it when he was trying to avoid sleeping on his right side and began sleeping on his left side. States that it happens often throughout the day. Denies any gait instability, clumsiness, frequently dropping items in the hand. Physical Exam:     Height: 6' 3\" (1.905 m), Weight: 270 lb (122.5 kg) (per pt)    General: Alert and oriented x3, no acute distress  Cardiovascular/pulmonary: No labored breathing, peripheral perfusion intact  Musculoskeletal:    Shoulder Exam:   On evaluation, the right shoulder has no deformity. There is no evidence of atrophy or bony abnormality. There is no ecchymosis or swelling. Spurling's maneuver positive for radicular symptoms on the left upper extremity with post left and rightward testing. Range of motion is 180 /50/T12 flexion/external rotation/internal rotation in the seated position.   Tenderness to palpation: None    Cuff testing:  Gene test (supraspinatus): negative for pain and/or weakness  Supine external rotation (infraspinatus): negative for pain and/or weakness  Belly press test and lift off test (subscapularis): positive for pain and/or weakness  Hornblower's sign (teres minor): negative for pain and weakness      Special tests:  Bearhug test: Positive for posterior sided pain  Yergason's test: negative  Speed's test: Negative  [place]'s test: negative           Controlled Substances Monitoring:      Imaging:  MRI of January 2021 reviewed. Assessment:   Right SLAP tear  Right subscapularis partial-thickness tear  Cervical radiculopathy, left-sided        Plan:   After extensive discussion with the patient we will have the patient follow-up with physical medicine rehabilitation for nerve testing to further delineate his symptoms. Patient agreeable to plan. Currently we will have the patient follow-up as needed for right shoulder pain. Justin Bahena, DO  Orthopaedic Surgery   3/2/22  2:05 PM     Staff Addendum    I have seen and evaluated the patient and agree with the assessment and plan as documented by the orthopaedic surgery resident. I have performed the key components of the history and physical examination and concur with the findings and plan, and have made changes where appropriate/necessary. Agree with above. Pain today seems to be more radicular in nature, is bilateral, goes to bilateral hands. This is reproducible with certain maneuvers involving his neck. At this point I would refer him to physical medicine rehab for further assessment. We can Saxman back regarding his shoulders after he has his current radicular symptoms managed.       Krissy De La Torre MD  [address] [place] [address]

## 2022-03-17 ENCOUNTER — APPOINTMENT (OUTPATIENT)
Dept: CT IMAGING | Age: 57
End: 2022-03-17
Payer: COMMERCIAL

## 2022-03-17 ENCOUNTER — HOSPITAL ENCOUNTER (EMERGENCY)
Age: 57
Discharge: HOME | End: 2022-03-17
Attending: EMERGENCY MEDICINE
Payer: COMMERCIAL

## 2022-03-17 ENCOUNTER — OFFICE VISIT (OUTPATIENT)
Dept: PRIMARY CARE CLINIC | Age: 57
End: 2022-03-17
Payer: COMMERCIAL

## 2022-03-17 VITALS
RESPIRATION RATE: 17 BRPM | TEMPERATURE: 99 F | DIASTOLIC BLOOD PRESSURE: 100 MMHG | SYSTOLIC BLOOD PRESSURE: 166 MMHG | OXYGEN SATURATION: 97 % | HEART RATE: 103 BPM

## 2022-03-17 VITALS
SYSTOLIC BLOOD PRESSURE: 148 MMHG | RESPIRATION RATE: 16 BRPM | BODY MASS INDEX: 33.57 KG/M2 | TEMPERATURE: 97.6 F | WEIGHT: 270 LBS | DIASTOLIC BLOOD PRESSURE: 90 MMHG | HEIGHT: 75 IN | HEART RATE: 77 BPM | OXYGEN SATURATION: 98 %

## 2022-03-17 DIAGNOSIS — N28.1 RENAL CYST: ICD-10-CM

## 2022-03-17 DIAGNOSIS — R11.2 NAUSEA AND VOMITING IN ADULT: ICD-10-CM

## 2022-03-17 DIAGNOSIS — R07.9 CHEST PAIN, UNSPECIFIED TYPE: ICD-10-CM

## 2022-03-17 DIAGNOSIS — I10 ESSENTIAL HYPERTENSION: Primary | ICD-10-CM

## 2022-03-17 DIAGNOSIS — R06.02 SOB (SHORTNESS OF BREATH): Primary | ICD-10-CM

## 2022-03-17 LAB
ALBUMIN SERPL-MCNC: 3.7 G/DL (ref 3.5–5.2)
ALP BLD-CCNC: 47 U/L (ref 40–129)
ALT SERPL-CCNC: 23 U/L (ref 0–40)
ANION GAP SERPL CALCULATED.3IONS-SCNC: 14 MMOL/L (ref 7–16)
AST SERPL-CCNC: 22 U/L (ref 0–39)
BACTERIA: ABNORMAL /HPF
BASOPHILS ABSOLUTE: 0.06 E9/L (ref 0–0.2)
BASOPHILS RELATIVE PERCENT: 0.8 % (ref 0–2)
BETA-HYDROXYBUTYRATE: 0.15 MMOL/L (ref 0.02–0.27)
BILIRUB SERPL-MCNC: 0.5 MG/DL (ref 0–1.2)
BILIRUBIN URINE: NEGATIVE
BLOOD, URINE: NEGATIVE
BUN BLDV-MCNC: 8 MG/DL (ref 6–20)
CALCIUM SERPL-MCNC: 9.1 MG/DL (ref 8.6–10.2)
CHLORIDE BLD-SCNC: 101 MMOL/L (ref 98–107)
CLARITY: CLEAR
CO2: 24 MMOL/L (ref 22–29)
COLOR: YELLOW
CREAT SERPL-MCNC: 0.7 MG/DL (ref 0.7–1.2)
D DIMER: 228 NG/ML DDU
EKG ATRIAL RATE: 76 BPM
EKG P AXIS: 40 DEGREES
EKG P-R INTERVAL: 202 MS
EKG Q-T INTERVAL: 382 MS
EKG QRS DURATION: 92 MS
EKG QTC CALCULATION (BAZETT): 429 MS
EKG R AXIS: 10 DEGREES
EKG T AXIS: 46 DEGREES
EKG VENTRICULAR RATE: 76 BPM
EOSINOPHILS ABSOLUTE: 0.16 E9/L (ref 0.05–0.5)
EOSINOPHILS RELATIVE PERCENT: 2 % (ref 0–6)
EPITHELIAL CELLS, UA: ABNORMAL /HPF
GFR AFRICAN AMERICAN: >60
GFR NON-AFRICAN AMERICAN: >60 ML/MIN/1.73
GLUCOSE BLD-MCNC: 213 MG/DL (ref 74–99)
GLUCOSE URINE: 250 MG/DL
HCT VFR BLD CALC: 43.6 % (ref 37–54)
HEMOGLOBIN: 15.7 G/DL (ref 12.5–16.5)
IMMATURE GRANULOCYTES #: 0.09 E9/L
IMMATURE GRANULOCYTES %: 1.2 % (ref 0–5)
INFLUENZA A BY PCR: NOT DETECTED
INFLUENZA B BY PCR: NOT DETECTED
KETONES, URINE: NEGATIVE MG/DL
LACTIC ACID: 2.7 MMOL/L (ref 0.5–2.2)
LEUKOCYTE ESTERASE, URINE: NEGATIVE
LIPASE: 23 U/L (ref 13–60)
LYMPHOCYTES ABSOLUTE: 1.67 E9/L (ref 1.5–4)
LYMPHOCYTES RELATIVE PERCENT: 21.4 % (ref 20–42)
MCH RBC QN AUTO: 32.8 PG (ref 26–35)
MCHC RBC AUTO-ENTMCNC: 36 % (ref 32–34.5)
MCV RBC AUTO: 91.2 FL (ref 80–99.9)
MONOCYTES ABSOLUTE: 0.69 E9/L (ref 0.1–0.95)
MONOCYTES RELATIVE PERCENT: 8.8 % (ref 2–12)
NEUTROPHILS ABSOLUTE: 5.15 E9/L (ref 1.8–7.3)
NEUTROPHILS RELATIVE PERCENT: 65.8 % (ref 43–80)
NITRITE, URINE: NEGATIVE
PDW BLD-RTO: 12.4 FL (ref 11.5–15)
PH UA: 7 (ref 5–9)
PH VENOUS: 7.36 (ref 7.35–7.45)
PLATELET # BLD: 241 E9/L (ref 130–450)
PMV BLD AUTO: 10.4 FL (ref 7–12)
POTASSIUM SERPL-SCNC: 3.8 MMOL/L (ref 3.5–5)
PROTEIN UA: NEGATIVE MG/DL
RBC # BLD: 4.78 E12/L (ref 3.8–5.8)
RBC UA: ABNORMAL /HPF (ref 0–2)
SARS-COV-2, NAAT: NOT DETECTED
SODIUM BLD-SCNC: 139 MMOL/L (ref 132–146)
SPECIFIC GRAVITY UA: <=1.005 (ref 1–1.03)
TOTAL PROTEIN: 6.9 G/DL (ref 6.4–8.3)
TROPONIN, HIGH SENSITIVITY: <6 NG/L (ref 0–11)
TROPONIN, HIGH SENSITIVITY: <6 NG/L (ref 0–11)
UROBILINOGEN, URINE: 0.2 E.U./DL
WBC # BLD: 7.8 E9/L (ref 4.5–11.5)
WBC UA: ABNORMAL /HPF (ref 0–5)

## 2022-03-17 PROCEDURE — 99213 OFFICE O/P EST LOW 20 MIN: CPT | Performed by: NURSE PRACTITIONER

## 2022-03-17 PROCEDURE — 96375 TX/PRO/DX INJ NEW DRUG ADDON: CPT

## 2022-03-17 PROCEDURE — 96374 THER/PROPH/DIAG INJ IV PUSH: CPT

## 2022-03-17 PROCEDURE — 87635 SARS-COV-2 COVID-19 AMP PRB: CPT

## 2022-03-17 PROCEDURE — 6360000004 HC RX CONTRAST MEDICATION: Performed by: RADIOLOGY

## 2022-03-17 PROCEDURE — 2500000003 HC RX 250 WO HCPCS: Performed by: EMERGENCY MEDICINE

## 2022-03-17 PROCEDURE — 85025 COMPLETE CBC W/AUTO DIFF WBC: CPT

## 2022-03-17 PROCEDURE — 36415 COLL VENOUS BLD VENIPUNCTURE: CPT

## 2022-03-17 PROCEDURE — 83605 ASSAY OF LACTIC ACID: CPT

## 2022-03-17 PROCEDURE — 93005 ELECTROCARDIOGRAM TRACING: CPT | Performed by: EMERGENCY MEDICINE

## 2022-03-17 PROCEDURE — 82800 BLOOD PH: CPT

## 2022-03-17 PROCEDURE — 87502 INFLUENZA DNA AMP PROBE: CPT

## 2022-03-17 PROCEDURE — 83690 ASSAY OF LIPASE: CPT

## 2022-03-17 PROCEDURE — 80053 COMPREHEN METABOLIC PANEL: CPT

## 2022-03-17 PROCEDURE — 84484 ASSAY OF TROPONIN QUANT: CPT

## 2022-03-17 PROCEDURE — 74177 CT ABD & PELVIS W/CONTRAST: CPT

## 2022-03-17 PROCEDURE — 93010 ELECTROCARDIOGRAM REPORT: CPT | Performed by: INTERNAL MEDICINE

## 2022-03-17 PROCEDURE — 6360000002 HC RX W HCPCS: Performed by: EMERGENCY MEDICINE

## 2022-03-17 PROCEDURE — 85378 FIBRIN DEGRADE SEMIQUANT: CPT

## 2022-03-17 PROCEDURE — 71275 CT ANGIOGRAPHY CHEST: CPT

## 2022-03-17 PROCEDURE — 82010 KETONE BODYS QUAN: CPT

## 2022-03-17 PROCEDURE — 99284 EMERGENCY DEPT VISIT MOD MDM: CPT

## 2022-03-17 PROCEDURE — 81001 URINALYSIS AUTO W/SCOPE: CPT

## 2022-03-17 RX ORDER — LABETALOL HYDROCHLORIDE 5 MG/ML
10 INJECTION, SOLUTION INTRAVENOUS ONCE
Status: COMPLETED | OUTPATIENT
Start: 2022-03-17 | End: 2022-03-17

## 2022-03-17 RX ORDER — ENALAPRILAT 2.5 MG/2ML
1.25 INJECTION INTRAVENOUS ONCE
Status: COMPLETED | OUTPATIENT
Start: 2022-03-17 | End: 2022-03-17

## 2022-03-17 RX ORDER — HYDRALAZINE HYDROCHLORIDE 20 MG/ML
10 INJECTION INTRAMUSCULAR; INTRAVENOUS ONCE
Status: COMPLETED | OUTPATIENT
Start: 2022-03-17 | End: 2022-03-17

## 2022-03-17 RX ADMIN — HYDRALAZINE HYDROCHLORIDE 10 MG: 20 INJECTION, SOLUTION INTRAMUSCULAR; INTRAVENOUS at 10:44

## 2022-03-17 RX ADMIN — ENALAPRILAT 1.25 MG: 1.25 INJECTION INTRAVENOUS at 15:05

## 2022-03-17 RX ADMIN — IOPAMIDOL 75 ML: 755 INJECTION, SOLUTION INTRAVENOUS at 13:51

## 2022-03-17 RX ADMIN — LABETALOL HYDROCHLORIDE 10 MG: 5 INJECTION INTRAVENOUS at 16:51

## 2022-03-17 NOTE — PROGRESS NOTES
Chief Complaint:   Shortness of Breath, Emesis, and Chest Pain      History of Present Illness   Source of history provided by:  patient. Early Apley. is a 56 y.o. old male with a past medical history of:   Past Medical History:   Diagnosis Date    Diabetes mellitus ([facility] [address]     Encounter for screening colonoscopy     Hyperlipemia     Hypertension           Early Apley. complains of chest pain. Onset was few days ago. Symptoms have been unchanged since that time. The patient's pain is intermittent. The patient describes the pain as squeezing and does not radiate. Associated symptoms are: N/V and intermittent SOB. Aggravating factors are: physical exertion. Alleviating factors are: none. Patient's cardiac risk factors are: advanced age (older than 54 for men, 72 for women), dyslipidemia, hypertension and male gender. Patient's risk factors for DVT/PE: no h/o DVT. Previous cardiac testing: follows w/Cardiology. Pt is requesting chest xray today, wife is present in office, last emesis was last evening     ROS    Unless otherwise stated in this report or unable to obtain because of the patient's clinical or mental status as evidenced by the medical record, this patients's positive and negative responses for Review of Systems, constitutional, psych, eyes, ENT, cardiovascular, respiratory, gastrointestinal, neurological, genitourinary, musculoskeletal, integument systems and systems related to the presenting problem are either stated in the preceding or were not pertinent or were negative for the symptoms and/or complaints related to the medical problem. Past Surgical History:  has a past surgical history that includes Tonsillectomy; Upper gastrointestinal endoscopy (09/20/12); sinus surgery; and Colonoscopy (N/A, 7/31/2019). Social History:  reports that he has been smoking cigarettes and cigars. He has never used smokeless tobacco. He reports current alcohol use.  He reports that he does not use drugs. Family History: family history includes Alzheimer's Disease in his mother; Anxiety Disorder in his sister; No Known Problems in his father. Allergies: Patient has no known allergies. Physical Exam         VS:  BP (!) 148/90   Pulse 77   Temp 97.6 °F (36.4 °C)   Resp 16   Ht 6' 3\" (1.905 m)   Wt 270 lb (122.5 kg)   SpO2 98%   BMI 33.75 kg/m²    Oxygen Saturation Interpretation: Normal.    Constitutional:  Alert, development consistent with age. Ears:  External Ears: Normal bilateral pinna. TM's & External Canals: TM's normal bilaterally without perforation. Canals without erythema or drainage. Nose:   There is no obvious septal defect. Mouth:  Moist bucca mucosa and normal tongue. Throat: Airway patent  Neck:  Supple. There is no obvious adenopathy or neck tenderness. Lungs:   Breath sounds: Normal chest expansion and breath sounds noted throughout. No wheezes, rales, or rhonchi noted. Pulse ox 98%  Heart:  Regular rate and rhythm, normal heart sounds, without pathological murmurs, ectopy, gallops, or rubs. no pain reproduced with palpation to chest wall  Skin:  Normal turgor. Warm, dry, without visible rash. Neurological:  Oriented. Motor functions intact. Lab / Imaging Results   (All laboratory and radiology results have been personally reviewed by myself)  Labs:  No results found for this visit on 03/17/22. Imaging: All Radiology results interpreted by Radiologist unless otherwise noted. No orders to display         Assessment / Plan     Impression(s):  1. SOB (shortness of breath)    2. Chest pain, unspecified type    3.  Nausea and vomiting in adult      Disposition:  Disposition: Chest Xray, thoroughly discussed with pt and wife on need to go to ED now for further Cardiac evaluation, Pt stated he would call Cardiologist-Dr. Holly Anderson but advised he needed to be seen in the ED-verbal understanding obtained

## 2022-03-17 NOTE — ED PROVIDER NOTES
Patient signed out by oncoming provider only pending HTN improvement. See other provider note for full H&P. Denying chest pain or dyspnea to me. Reassuring H&P. Was hypertensive but asymptomatic on my evaluations without symptoms. No concern for dissection, neurovasc in tact. BP improving after labetalol. Discussed HTN with patient. Hasn't take BP in many days. HTN most likely 2.2 to this. Was somewhat difficult to control in the ED as required multiple medications from previous provider. Will give script for metoprolol for home but long and extensive conversation with patient and wife about bp and need to take his normal bp meds at home without the metoprolol. discussed parameters as to when to take the metoprolol at home. At this time HTN most likely because not taking his home meds but with it being difficult to control will give him script for metoprolol and only instructed to take it if previous home medication not working. They verbalized understanding. Has an appointment with PCP next Wednesday and needs to discuss medication and bp control. Strict return precautions discussed including but not limited to CP, dyspnea, leg pain or swelling, numbness, tinging, weakness. He verbalized understanding and will follow up. All questions answered and discharged.       Jaz Stauffer,   Resident  03/18/22 0030

## 2022-03-17 NOTE — ED NOTES
Discharge instructions given and pt verbalized understanding. Gait steady. No distress noted.      Trupti Whelan, RN  03/17/22 7036

## 2022-03-17 NOTE — ED PROVIDER NOTES
HPI:  3/17/22, Time: 10:37 AM EDT         Celia Barnhart is a 56 y.o. male presenting to the ED for body aches, chills, cough, beginning 5 days ago, Sunday. He reports he ate dinner on Sunday, then had epigastric stomach pain, nausea and vomiting (no blood, no bile) all night into Monday. Reports the nausea and vomiting as well as the abdominal pain stopped but then started to develop the other symptoms including body aches, chest wall pains, chills and cough. He went to urgent care this morning and they did a chest x-ray which was negative, and EKG and was advised to come to the ER for chest pain. He currently denies chest pressure or shortness of breath and his abdominal pain has resolved. He states he has not been taking any of his home medications for the past couple of days due to the nausea and vomiting and not feeling well. He reports he has not taken his diabetic medication because he just no appetite any you need to eat with the medication. He reports chronic left arm pain related to a radiculopathy that has already been addressed. The complaint has been persistent, moderate in severity, and worsened by nothing. Patient denies  sore throat, congestion, chest pressure, shortness of breath, edema, headache, visual disturbances, focal paresthesias, focal weakness, abdominal pain, nausea, vomiting, diarrhea, constipation, dysuria, hematuria, trauma, neck or back pain, rash or other complaints. ROS:   A complete review of systems was performed and all pertinent positives and negatives are stated within HPI, all other systems reviewed and are negative.      --------------------------------------------- PAST HISTORY ---------------------------------------------  Past Medical History:  has a past medical history of [facility] Encounter for screening colonoscopy, Hyperlipemia, and Hypertension. Past Surgical History:  has a past surgical history that includes Tonsillectomy;  Upper gastrointestinal endoscopy (09/20/12); sinus surgery; and Colonoscopy (N/A, 7/31/2019). Social History:  reports that he has been smoking cigarettes and cigars. He has never used smokeless tobacco. He reports current alcohol use. He reports that he does not use drugs. Family History: family history includes Alzheimer's Disease in his mother; Anxiety Disorder in his sister; No Known Problems in his father. The patients home medications have been reviewed. Allergies: Patient has no known allergies. ----------------------------------------PHYSICAL EXAM--------------------------------------  Constitutional:  Well developed, well nourished, no acute distress, non-toxic appearance   Eyes:  PERRL, conjunctiva normal, EOMI  HENT:  Atraumatic, external ears normal, nose normal, oropharynx moist, no pharyngeal exudates, redness or swelling. TMs clear and intact bilaterally. Neck- normal range of motion, no nuchal rigidity   Respiratory:  No respiratory distress, normal breath sounds, no rales, no wheezing   Cardiovascular:  Normal rate, normal rhythm, no murmurs, no gallops, no rubs. Radial  pulses 2+ bilaterally. Compartments are soft. He is warm and well perfused. Cap refill less than 3 seconds. GI:  Soft, nondistended, normal bowel sounds, nontender, no organomegaly, no mass, no rebound, no guarding   :  No costovertebral angle tenderness   Musculoskeletal:  No edema, no tenderness, no deformities. Back- no tenderness  Integument:  Well hydrated, no visible rash. Adequate perfusion. Lymphatic:  No cervical lymphadenopathy noted   Neurologic:  Alert & oriented x 3, CN 2-12 normal, no focal deficits noted. Normal gait. Psychiatric:  Speech and behavior appropriate       -------------------------------------------------- RESULTS -------------------------------------------------  I have personally reviewed all laboratory and imaging results for this patient. Results are listed below. LABS:  Results for orders placed or performed during the hospital encounter of 03/17/22   Rapid influenza A/B antigens    Specimen: Nasopharyngeal   Result Value Ref Range    Influenza A by PCR Not Detected Not Detected    Influenza B by PCR Not Detected Not Detected   COVID-19, Rapid    Specimen: Nasopharyngeal Swab   Result Value Ref Range    SARS-CoV-2, NAAT Not Detected Not Detected   CBC with Auto Differential   Result Value Ref Range    WBC 7.8 4.5 - 11.5 E9/L    RBC 4.78 3.80 - 5.80 E12/L    Hemoglobin 15.7 12.5 - 16.5 g/dL    Hematocrit 43.6 37.0 - 54.0 %    MCV 91.2 80.0 - 99.9 fL    MCH 32.8 26.0 - 35.0 pg    MCHC 36.0 (H) 32.0 - 34.5 %    RDW 12.4 11.5 - 15.0 fL    Platelets 715 340 - 151 E9/L    MPV 10.4 7.0 - 12.0 fL    Neutrophils % 65.8 43.0 - 80.0 %    Immature Granulocytes % 1.2 0.0 - 5.0 %    Lymphocytes % 21.4 20.0 - 42.0 %    Monocytes % 8.8 2.0 - 12.0 %    Eosinophils % 2.0 0.0 - 6.0 %    Basophils % 0.8 0.0 - 2.0 %    Neutrophils Absolute 5.15 1.80 - 7.30 E9/L    Immature Granulocytes # 0.09 E9/L    Lymphocytes Absolute 1.67 1.50 - 4.00 E9/L    Monocytes Absolute 0.69 0.10 - 0.95 E9/L    Eosinophils Absolute 0.16 0.05 - 0.50 E9/L    Basophils Absolute 0.06 0.00 - 0.20 E9/L   Comprehensive Metabolic Panel   Result Value Ref Range    Sodium 139 132 - 146 mmol/L    Potassium 3.8 3.5 - 5.0 mmol/L    Chloride 101 98 - 107 mmol/L    CO2 24 22 - 29 mmol/L    Anion Gap 14 7 - 16 mmol/L    Glucose 213 (H) 74 - 99 mg/dL    BUN 8 6 - 20 mg/dL    CREATININE 0.7 0.7 - 1.2 mg/dL    GFR Non-African American >60 >=60 mL/min/1.73    GFR African American >60     Calcium 9.1 8.6 - 10.2 mg/dL    Total Protein 6.9 6.4 - 8.3 g/dL    Albumin 3.7 3.5 - 5.2 g/dL    Total Bilirubin 0.5 0.0 - 1.2 mg/dL    Alkaline Phosphatase 47 40 - 129 U/L    ALT 23 0 - 40 U/L    AST 22 0 - 39 U/L   Troponin   Result Value Ref Range    Troponin, High Sensitivity <6 0 - 11 ng/L   Lactic Acid   Result Value Ref Range    Lactic Acid 2.7 (H) 0.5 - 2.2 mmol/L   Lipase   Result Value Ref Range    Lipase 23 13 - 60 U/L   pH, venous   Result Value Ref Range    pH, Juan R 7.36 7.35 - 7.45   Beta-Hydroxybutyrate   Result Value Ref Range    Beta-Hydroxybutyrate 0.15 0.02 - 0.27 mmol/L   Urinalysis with Microscopic   Result Value Ref Range    Color, UA Yellow Straw/Yellow    Clarity, UA Clear Clear    Glucose, Ur 250 (A) Negative mg/dL    Bilirubin Urine Negative Negative    Ketones, Urine Negative Negative mg/dL    Specific Gravity, UA <=1.005 1.005 - 1.030    Blood, Urine Negative Negative    pH, UA 7.0 5.0 - 9.0    Protein, UA Negative Negative mg/dL    Urobilinogen, Urine 0.2 <2.0 E.U./dL    Nitrite, Urine Negative Negative    Leukocyte Esterase, Urine Negative Negative    WBC, UA 0-1 0 - 5 /HPF    RBC, UA NONE 0 - 2 /HPF    Epithelial Cells, UA FEW /HPF    Bacteria, UA NONE SEEN None Seen /HPF   D-Dimer, Quantitative   Result Value Ref Range    D-Dimer, Quant 228 ng/mL DDU   Troponin   Result Value Ref Range    Troponin, High Sensitivity <6 0 - 11 ng/L   EKG 12 Lead   Result Value Ref Range    Ventricular Rate 76 BPM    Atrial Rate 76 BPM    P-R Interval 202 ms    QRS Duration 92 ms    Q-T Interval 382 ms    QTc Calculation (Bazett) 429 ms    P Axis 40 degrees    R Axis 10 degrees    T Axis 46 degrees       RADIOLOGY:  Interpreted by Radiologist.  CTA PULMONARY W CONTRAST   Final Result   1. There is no pulmonary embolus   2. There are no findings of pneumonia   3. Linear atelectasis within the right lower lobe. CT ABDOMEN PELVIS W IV CONTRAST Additional Contrast? None   Final Result   1. Diverticulosis. 2. Redemonstration of complex appearing cyst involving posterior aspect of   the right kidney which appears similar in size compared to prior from   February 26, 2015. Ultrasound follow-up recommended for further   characterization.                EKG Interpretation  Time: 10:04 AM EST  Rhythm: normal sinus   Rate: 76  Axis: normal  Conduction: normal  ST Segments: nonspecific changes  T Waves: no acute change  Clinical Impression: non-specific EKG  Comparison to prior EKG: stable as compared to patient's most recent EKG      ------------------------- NURSING NOTES AND VITALS REVIEWED ---------------------------  The nursing notes within the ED encounter and vital signs as below have been reviewed by myself. BP (!) 166/100   Pulse 103   Temp 99 °F (37.2 °C) (Oral)   Resp 17   SpO2 97%   Oxygen Saturation Interpretation: Normal      The patients available past medical records and past encounters were reviewed. ------------------------------ ED COURSE/MEDICAL DECISION MAKING----------------------  Medications   hydrALAZINE (APRESOLINE) injection 10 mg (10 mg IntraVENous Given 3/17/22 1044)   iopamidol (ISOVUE-370) 76 % injection 75 mL (75 mLs IntraVENous Given 3/17/22 1351)   enalaprilat (VASOTEC) injection 1.25 mg (1.25 mg IntraVENous Given 3/17/22 1505)   labetalol (NORMODYNE;TRANDATE) injection 10 mg (10 mg IntraVENous Given 3/17/22 1651)           Procedures:   none      Medical Decision Making:    workkup and H & P reasssuring. BP elevated and concerning to patient, states it's never this high. Treated here with IV hydralazine, IV enalapril and monitored. Will continue to treat if necessary. He did advise that he hasn't taken his meds for a few days and is getting anxious looking at the numbers on the monitor today. Patient to be discharged or further treated by Dr Deon Mcnally as his care is signed over pending BP improvement. This patient's ED course included: re-evaluation prior to disposition, IV medications, cardiac monitoring, continuous pulse oximetry and a personal history and physicial eaxmination    This patient has remained hemodynamically stable, improved and been closely monitored during their ED course. Re-Evaluations:  Time: 1500   Re-evaluation.   Patients symptoms are improving  Repeat physical examination is not changed      Consultations:   none    Critical Care: none    Annamaria CHOI, DO, am the Primary Provider of Record    Counseling: The emergency provider has spoken with the patient and discussed todays results, in addition to providing specific details for the plan of care and counseling regarding the diagnosis and prognosis. Questions are answered at this time and they are agreeable with the plan.    --------------------------- IMPRESSION AND DISPOSITION ---------------------------------    IMPRESSION  1. Essential hypertension    2. Chest pain, unspecified type    3.  Renal cyst        DISPOSITION  Disposition: Discharge to home per Dr Esme Navarrete  Patient condition is stable             Ric Alegria DO  03/18/22 5161

## 2022-03-18 ENCOUNTER — CARE COORDINATION (OUTPATIENT)
Dept: OTHER | Facility: CLINIC | Age: 57
End: 2022-03-18

## 2022-03-18 NOTE — PROGRESS NOTES
CLINICAL PHARMACY NOTE: MEDS TO BEDS    Total # of Prescriptions Filled: 1   The following medications were delivered to the patient:  · Metoprolol tartrate 25 mg  · Picked up in pharmacy    Additional Documentation:

## 2022-03-18 NOTE — CARE COORDINATION
stressful job but has taken today off due to symptoms. States he plans to return to work on Monday. Reports short lived headache after showering this morning that he states is now gone since being outside and moving around. ACM discussed Be Well with DM program with patient who states he was enrolled in the program several years ago but is not currently enrolled. ACM offered to send patient information about program; patient declined offer stating he frequently receives info from [place] [address] regarding [facility] enrollment. ACM reinforced benefits of enrollment and instructed patient to reach out to [facility] or [facility] if he decides to re-enroll. Patient agreeable to follow up calls from [facility]. Interventions:    Counseling and education provided at today's visit on:   Red Flag symptoms to report  Self monitoring compliance  Medication compliance  Provider follow up appointment compliance  Reinforced Discharge instructions  Advanced Care Planning education    Medication reconciliation was performed with patient, who verbalizes understanding of administration of home medications. Advised obtaining a 90-day supply of all daily and as-needed medications. Reinforced resources available to patient including: PCP and Benefits related nurse triage line. ACM encouraged outreach to Nurse Access Line and/or ACM for assessment and intervention guidance as needed. ACM encouraged patient to contact 911 for life threatening emergencies and PCP office 24/7 for non life-threatening symptoms. Reminded patient of alternatives to ED such as urgent care, walk in clinics and e-visits as available. Reviewed proper ED utilization using Right Care, Right Place, Right Time Flyer. Discussed follow-up appointments. If no appointment was previously scheduled, appointment scheduling offered: No, already scheduled  Is follow up appointment scheduled within 7 days of discharge?  Yes  [facility] follow up appointment(s):   Future Appointments   Date Time Provider Liam Gore   3/23/2022 12:15 PM Kar Francis, DO [facility]   4/14/2022  7:00 AM Mihaela Salomón Starr, [facility]   4/19/2022  2:00 PM Jenny Grijalva [facility]     Non-[facility] follow up appointment(s): None    Plan:  Continue weekly outreaches to provide telephonic support, education and resources as needed. Discuss / follow up on:   Self monitoring compliance  Diet compliance  Medication compliance  Provider follow up appointment compliance  Goal Progress    Patient verbalized understanding and is agreeable. Goals      Conditions and Symptoms      I will keep my appointment or reschedule if I have to cancel. I will notify my provider of any barriers to my plan of care. I will notify my provider of any symptoms that indicate a worsening of my condition. Barriers: none  Plan for overcoming my barriers: N/A  Confidence: 9/10  Anticipated Goal Completion Date: 4/10/2022    3/18/2022: Patient has PCP follow up 3/23/22. ACM discussed red flags and NAL this date. Edwin Oliva, MSN RN  Associate Care Manager  Phone: [phone]  Email: [email or web address]

## 2022-03-21 ENCOUNTER — TELEPHONE (OUTPATIENT)
Dept: FAMILY MEDICINE CLINIC | Age: 57
End: 2022-03-21

## 2022-03-21 ENCOUNTER — TELEPHONE (OUTPATIENT)
Dept: ADMINISTRATIVE | Age: 57
End: 2022-03-21

## 2022-03-21 DIAGNOSIS — R07.9 CHEST PAIN, UNSPECIFIED TYPE: ICD-10-CM

## 2022-03-21 DIAGNOSIS — I10 HYPERTENSION, UNSPECIFIED TYPE: Primary | ICD-10-CM

## 2022-03-21 DIAGNOSIS — I10 UNCONTROLLED HYPERTENSION: Primary | ICD-10-CM

## 2022-03-21 NOTE — TELEPHONE ENCOUNTER
NP scheduled from the [facility]. Patient Appointment Form:      PCP: Dr. Delaney Lewis  Referring: Mylene RAYMUNDO     Has the Patient:    Seen a Cardiologist? Yes Sonido Mckeon [facility]     Had a heart catheterization? No    Had heart surgery? No    Had a stress test or nuclear stress test? No    Had an echocardiogram? No    Had a vascular ultrasound? No    Had a 24/48 heart monitor or extended cardiac event monitor? No    Had recent blood work in the last 6 months? Yes 3/17/22 Epic     Had a pacemaker/ICD/ILR implant? No    Seen an Electrophysiologist? No        Will send records via: All recs in epic       Date & time of appointment:  3/22/22 @ 9:15 with Dr. Gui Reed.

## 2022-03-22 ENCOUNTER — OFFICE VISIT (OUTPATIENT)
Dept: CARDIOLOGY CLINIC | Age: 57
End: 2022-03-22
Payer: COMMERCIAL

## 2022-03-22 VITALS
WEIGHT: 278 LBS | OXYGEN SATURATION: 97 % | HEIGHT: 75 IN | BODY MASS INDEX: 34.57 KG/M2 | HEART RATE: 70 BPM | SYSTOLIC BLOOD PRESSURE: 172 MMHG | DIASTOLIC BLOOD PRESSURE: 100 MMHG | RESPIRATION RATE: 16 BRPM

## 2022-03-22 DIAGNOSIS — I10 HYPERTENSION, UNSPECIFIED TYPE: Primary | ICD-10-CM

## 2022-03-22 PROBLEM — E66.9 OBESITY: Status: ACTIVE | Noted: 2019-03-13

## 2022-03-22 PROCEDURE — 93000 ELECTROCARDIOGRAM COMPLETE: CPT | Performed by: INTERNAL MEDICINE

## 2022-03-22 PROCEDURE — 99204 OFFICE O/P NEW MOD 45 MIN: CPT | Performed by: INTERNAL MEDICINE

## 2022-03-22 NOTE — PROGRESS NOTES
Vaping Use    Vaping Use: Never used   Substance and Sexual Activity    Alcohol use: Yes     Comment: Once per week    Drug use: No    Sexual activity: Yes   Other Topics Concern    Not on file   Social History Narrative    Not on file     Social Determinants of Health     Financial Resource Strain: Low Risk     Difficulty of Paying Living Expenses: Not hard at all   Food Insecurity: No Food Insecurity    Worried About [address] in the Last Year: Never true    [address] in the Last Year: Never true   Transportation Needs:     Lack of Transportation (Medical): Not on file    Lack of Transportation (Non-Medical): Not on file   Physical Activity:     Days of Exercise per Week: Not on file    Minutes of Exercise per Session: Not on file   Stress:     Feeling of Stress : Not on file   Social Connections:     Frequency of Communication with Friends and Family: Not on file    Frequency of Social Gatherings with Friends and Family: Not on file    Attends Voodoo Services: Not on file    Active Member of [address] [place] [address] or Organizations: Not on file    Attends Club or Organization Meetings: Not on file    Marital Status: Not on file   Intimate Partner Violence:     Fear of Current or Ex-Partner: Not on file    Emotionally Abused: Not on file    Physically Abused: Not on file    Sexually Abused: Not on file   Housing Stability:     Unable to Pay for Housing in the Last Year: Not on file    Number of [place] in the Last Year: Not on file    Unstable Housing in the Last Year: Not on file       Family History   Problem Relation Age of Onset    Alzheimer's Disease Mother     No Known Problems Father     Anxiety Disorder Sister          SUBJECTIVE: Princess العراقي. presents to the office today for consult - dr Myrtle Banda. I reviewed cardiology eval of 2013 - normal nuke, weight > 300 lbs and elevated BP on more aggressive BP regimen than currently.  I also reviewed recent ED visit for non cardiac cause, incidental finding of elevated BP after being non complinat with meds for several days. 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 Smokes. No PALAK. Review of Systems:   Heart: as above   Lungs: as above   Eyes: denies changes in vision or discharge. Ears: denies changes in hearing or pain. Nose: denies epistaxis or masses   Throat: denies sore throat or trouble swallowing. Neuro: denies numbness, tingling, tremors. Skin: denies rashes or itching. : denies hematuria, dysuria   GI: denies vomiting, diarrhea   Psych: denies mood changed, anxiety, depression. all others negative. Physical Exam   BP (!) 172/100   Pulse 70   Resp 16   Ht 6' 3\" (1.905 m)   Wt 278 lb (126.1 kg)   SpO2 97%   BMI 34.75 kg/m²   Constitutional: Oriented to person, place, and time. Obese  No distress. Head: Normocephalic and atraumatic. Eyes: EOM are normal. Pupils are equal, round, and reactive to light. Neck: Normal range of motion. Neck supple. No hepatojugular reflux and no JVD present. Carotid bruit is not present. Cardiovascular: Normal rate, regular rhythm, normal heart sounds and intact distal pulses. Exam reveals no gallop and no friction rub. No murmur heard. Pulmonary/Chest: Effort normal and breath sounds normal. No respiratory distress. No wheezes. No rales. Abdominal: Soft. Bowel sounds are normal. No distension and no mass. No tenderness. No rebound and no guarding. Musculoskeletal: Normal range of motion. No edema and no tenderness. Neurological: Alert and oriented to person, place, and time. Skin: Skin is warm and dry. No rash noted. Not diaphoretic. No erythema. Psychiatric: Normal mood and affect. Behavior is normal.     EKG:  normal EKG, normal sinus rhythm.     ASSESSMENT AND PLAN:  Patient Active Problem List   Diagnosis    Hyperlipemia    Hypertension    Diabetes mellitus type 2 in obese ([address]    Vitamin D deficiency    Obesity    Seasonal allergies     No cardiac symptoms  Normal CV exam and ekg  Long discussion with him about pharmacologic and non pharmacologic approach to BP management  He needs to start taking his BP at home on daily basis and management decisions made on that  Education on importance of, adequate weight control (goal BMI of < 27), physical activity consisting of moderate cardiopulmonary exercise up to a goal of 150-250 min/wk, smoking cessation and limited ETOH intake.    We already see he is on a much lessened BP regimen since 2013 with weight loss  His recent elevation in BP is not surprising given his non compliance with meds and his acute illness  If home BP readings going forward are indeed elevated his PCP can consider doubling the ARB/thiazide combo    OV prn              Attila Victoria M.D  [facility]

## 2022-03-23 ENCOUNTER — OFFICE VISIT (OUTPATIENT)
Dept: FAMILY MEDICINE CLINIC | Age: 57
End: 2022-03-23
Payer: COMMERCIAL

## 2022-03-23 VITALS
HEART RATE: 76 BPM | TEMPERATURE: 98.4 F | HEIGHT: 75 IN | BODY MASS INDEX: 34.44 KG/M2 | SYSTOLIC BLOOD PRESSURE: 132 MMHG | WEIGHT: 277 LBS | RESPIRATION RATE: 20 BRPM | DIASTOLIC BLOOD PRESSURE: 89 MMHG

## 2022-03-23 DIAGNOSIS — M54.2 NECK PAIN: ICD-10-CM

## 2022-03-23 DIAGNOSIS — M54.12 CERVICAL RADICULOPATHY: Primary | ICD-10-CM

## 2022-03-23 PROCEDURE — 99213 OFFICE O/P EST LOW 20 MIN: CPT | Performed by: FAMILY MEDICINE

## 2022-03-23 ASSESSMENT — PATIENT HEALTH QUESTIONNAIRE - PHQ9
SUM OF ALL RESPONSES TO PHQ QUESTIONS 1-9: 0
SUM OF ALL RESPONSES TO PHQ QUESTIONS 1-9: 0
SUM OF ALL RESPONSES TO PHQ9 QUESTIONS 1 & 2: 0
1. LITTLE INTEREST OR PLEASURE IN DOING THINGS: 0
SUM OF ALL RESPONSES TO PHQ QUESTIONS 1-9: 0
SUM OF ALL RESPONSES TO PHQ QUESTIONS 1-9: 0
2. FEELING DOWN, DEPRESSED OR HOPELESS: 0

## 2022-03-23 ASSESSMENT — ENCOUNTER SYMPTOMS
COUGH: 0
SORE THROAT: 0
NAUSEA: 0
SHORTNESS OF BREATH: 0
CONSTIPATION: 0
EYE ITCHING: 0
BACK PAIN: 0
EYE PAIN: 0
RHINORRHEA: 0
DIARRHEA: 0
ABDOMINAL PAIN: 0
CHEST TIGHTNESS: 0

## 2022-03-23 NOTE — PROGRESS NOTES
Mary Carrasquillo. : 1965    Chief Complaint:     Chief Complaint   Patient presents with    ED Follow-up       HPI  Mary Carrasquillo. 56 y.o. presents for   Chief Complaint   Patient presents with   [facility] ED Follow-up     Patient presents for left shoulder/neck pain that has been ongoing for the past few weeks. He states that he was told to do some exercises which did not help. He does have some numbness and tingling in his hand at times. He did go see an orthopedic who referred him to [facility]. He does have an appointment with them. However he wishes for referral to neurosurgery. He has not had any x-rays of his neck. All questions were answered to patients satisfaction. Past Medical History:   Diagnosis Date    Diabetes mellitus ([facility] [address]     Encounter for screening colonoscopy     Hyperlipemia     Hypertension        No Known Allergies    Health Maintenance Due   Topic Date Due    Hepatitis C screen  Never done    Pneumococcal 0-64 years Vaccine (1 of 2 - PPSV23) Never done    Depression Screen  Never done    HIV screen  Never done    Hepatitis B vaccine (1 of 3 - Risk 3-dose series) Never done    DTaP/Tdap/Td vaccine (1 - Tdap) Never done    Shingles Vaccine (1 of 2) Never done    Diabetic retinal exam  2020    Diabetic microalbuminuria test  2021    Flu vaccine (1) Never done    Diabetic foot exam  10/06/2021         REVIEW OF SYSTEMS  Review of Systems   Constitutional: Negative for chills, fatigue and fever. HENT: Negative for congestion, ear pain, postnasal drip, rhinorrhea and sore throat. Eyes: Negative for pain and itching. Respiratory: Negative for cough, chest tightness and shortness of breath. Cardiovascular: Negative for chest pain and leg swelling. Gastrointestinal: Negative for abdominal pain, constipation, diarrhea and nausea. Endocrine: Negative for heat intolerance. Genitourinary: Negative for flank pain, frequency and urgency. Musculoskeletal: Positive for neck pain. Negative for arthralgias and back pain. Skin: Negative for pallor and rash. Allergic/Immunologic: Negative for environmental allergies. Neurological: Negative for dizziness, numbness and headaches. Hematological: Does not bruise/bleed easily. Psychiatric/Behavioral: Negative for agitation, behavioral problems and hallucinations. PHYSICAL EXAM  /89   Pulse 76   Temp 98.4 °F (36.9 °C)   Resp 20   Ht 6' 3\" (1.905 m)   Wt 277 lb (125.6 kg)   BMI 34.62 kg/m²   Physical Exam  Vitals reviewed. Constitutional:       Appearance: Normal appearance. He is normal weight. HENT:      Head: Normocephalic. Nose: Nose normal.   Eyes:      Extraocular Movements: Extraocular movements intact. Conjunctiva/sclera: Conjunctivae normal.   Pulmonary:      Effort: Pulmonary effort is normal.   Musculoskeletal:         General: Normal range of motion. Cervical back: Normal range of motion. Skin:     Findings: No rash. Neurological:      General: No focal deficit present. Mental Status: He is alert and oriented to person, place, and time. Mental status is at baseline. Psychiatric:         Mood and Affect: Mood normal.         Behavior: Behavior normal.         Thought Content: Thought content normal.         Judgment: Judgment normal.              Laboratory:   All laboratory and radiology results have been personally reviewed by myself    Lab Results   Component Value Date     03/17/2022    K 3.8 03/17/2022     03/17/2022    CO2 24 03/17/2022    BUN 8 03/17/2022    CREATININE 0.7 03/17/2022    PROT 6.9 03/17/2022    LABALBU 3.7 03/17/2022    LABALBU 4.4 05/15/2012    CALCIUM 9.1 03/17/2022    GFRAA >60 03/17/2022    LABGLOM >60 03/17/2022    GLUCOSE 213 03/17/2022    GLUCOSE 147 05/15/2012    AST 22 03/17/2022    ALT 23 03/17/2022    ALKPHOS 47 03/17/2022    BILITOT 0.5 03/17/2022    TSH 1.960 06/27/2017    VITD25 40 06/15/2021 CHOL 153 06/15/2021    TRIG 163 06/15/2021    HDL 33 06/15/2021    LDLCALC 87 06/15/2021    LABA1C 8.1 12/14/2021        Lab Results   Component Value Date    CHOL 153 06/15/2021     Lab Results   Component Value Date    TRIG 163 (H) 06/15/2021     Lab Results   Component Value Date    HDL 33 06/15/2021     Lab Results   Component Value Date    LDLCALC 87 06/15/2021    LDLCHOLESTEROL 82 10/29/2015       Lab Results   Component Value Date    LABA1C 8.1 12/14/2021     Lab Results   Component Value Date    LABMICR 26.3 (H) 06/27/2017    LDLCALC 87 06/15/2021    CREATININE 0.7 03/17/2022       ASSESSMENT/PLAN:    1. Cervical radiculopathy  -     [facility] Lira MD, Neurosurgery, [facility]  2. Neck pain  -     XR CERVICAL SPINE (2-3 VIEWS); Future  -     Gulshan Hernandez MD, Neurosurgery, [facility]     X-ray to be completed today. Referral placed to Dr. Sherren Brodie per patient preference. He does have an appointment in the next 2 weeks - will re-eval at that time. Problem list reviewed andsimplified/updated  HM reviewed today and counseled as appropriate    Call or go to ED immediately if symptoms worsen or persist.  Future Appointments   Date Time Provider Liam Gore   4/14/2022  7:00 AM DO Marcell[facility]   4/19/2022  2:00 PM Jenny Grijalva DO [facility]     Or sooner if necessary. Educational materials and/or homeexercises printed for patient's review and were included in patient instructions on his/her After Visit Summary and given to patient at the end of visit. Counseled regarding above diagnosis, including possible risks and complications,  especially if left uncontrolled. Counseled regarding the possible side effects, risks, benefits and alternatives to treatment; patient and/or guardian verbalizes understanding, agrees,feels comfortable with and wishes to proceed with above treatment plan.      Advised patient to call withany new medication issues, and read all Rx info from pharmacy to assure aware of all possible risks and side effects of medication before taking. Reviewed age and gender appropriate health screening exams and vaccinations. Advised patient regarding importance of keeping up with recommended health maintenance and toschedule as soon as possible if overdue, as this is important in assessing for undiagnosed pathology, especially cancer, as well as protecting against potentially harmful/life threatening disease. and/or guardian verbalizes understanding and agrees with above counseling, assessment and plan. All questions answered. Kar Blanco, DO  3/23/22    NOTE: This report was transcribed using voice recognition software.  Every effort was made to ensure accuracy; however, inadvertent computerized transcription errors may be present

## 2022-03-24 ENCOUNTER — CARE COORDINATION (OUTPATIENT)
Dept: OTHER | Facility: CLINIC | Age: 57
End: 2022-03-24

## 2022-03-24 NOTE — CARE COORDINATION
[facility] Trans[facility] ED Follow Up Call    3/24/2022    Patient: Celia Bermudez. Patient : 1965   MRN: [id number]  Reason for Admission: HTN, CP, Renal Cyst  Discharge Date: 3/17/2022        Care Transitions ED Follow Up    Care Transitions Interventions  Do you have any ongoing symptoms?: No   Do you have a copy of your discharge instructions?: Yes   Do you understand what to report and when to return?: Yes   Are you following your discharge instructions?: Yes   Do you have all of your prescriptions and are they filled?: Yes   Have you scheduled your follow up appointment?: Yes   How are you going to get to your appointment?: Car - drive self   Were you discharged with any Home Care or Post Acute Services or do you currently have any active services?: No               S D  ([facility]) received return call from patient this date who states he was able to get needed part for home BP cuff and has been monitoring BP at home. States BP normal and has not needed any doses of Metoprolol. States had follow up with PCP and cardiology who believe elevated BP was related to patient not taking BP meds for a couple days (due to GI symptoms) prior to ED visit. Denies headache or any other symptoms at this time. Denies needs or concerns. ACM signing off due to goals met and no further needs. Patient verbalized understanding and agreement to plan. BRYON Avila RN  Associate Care Manager  Phone: [phone]  Email: [email or web address]

## 2022-04-11 ENCOUNTER — INITIAL CONSULT (OUTPATIENT)
Dept: NEUROSURGERY | Age: 57
End: 2022-04-11
Payer: COMMERCIAL

## 2022-04-11 VITALS
BODY MASS INDEX: 34.44 KG/M2 | WEIGHT: 277 LBS | SYSTOLIC BLOOD PRESSURE: 182 MMHG | RESPIRATION RATE: 20 BRPM | OXYGEN SATURATION: 96 % | TEMPERATURE: 97.8 F | HEART RATE: 82 BPM | DIASTOLIC BLOOD PRESSURE: 109 MMHG | HEIGHT: 75 IN

## 2022-04-11 DIAGNOSIS — M54.12 CERVICAL RADICULOPATHY: Primary | ICD-10-CM

## 2022-04-11 PROCEDURE — 99203 OFFICE O/P NEW LOW 30 MIN: CPT | Performed by: PHYSICIAN ASSISTANT

## 2022-04-11 ASSESSMENT — ENCOUNTER SYMPTOMS
SHORTNESS OF BREATH: 0
PHOTOPHOBIA: 0
ABDOMINAL PAIN: 0
TROUBLE SWALLOWING: 0
BACK PAIN: 0

## 2022-04-11 NOTE — PROGRESS NOTES
Subjective:      Patient ID: Mil Hernandez. is a 56 y.o. male. Lorene Ferreira is a 56year old male presenting to the office today as a new patient c/o left arm pain and numbness/tingling. Describes the pain as throbbing, pinching, and itching. Pain starts in his left shoulder and intermittently radiates down the arm. States he also has pain in his right shoulder, but has a history of right shoulder tendonitis. He has tried rest, chiropractor, and physical therapy without significant relief. Pt takes 81mg aspirin daily and smokes occasionally. Denies injections in his neck. Denies headache, neck pain, loss of bowel or bladder, saddle anesthesia, loss of dexterity, fever, chills, SOB, N/V, or chest pain. Cervical x-rays demonstrate very mild degenerative disc disease primarily C5-C7.  No fracture or dislocation.  Normal soft tissues. Review of Systems   Constitutional: Negative for fever and unexpected weight change. HENT: Negative for trouble swallowing. Eyes: Negative for photophobia and visual disturbance. Respiratory: Negative for shortness of breath. Cardiovascular: Negative for chest pain. Gastrointestinal: Negative for abdominal pain. Endocrine: Negative for heat intolerance. Genitourinary: Negative for flank pain. Musculoskeletal: Positive for arthralgias. Negative for back pain, gait problem, myalgias and neck pain. Skin: Negative for wound. Neurological: Positive for weakness and numbness. Negative for headaches. Psychiatric/Behavioral: Negative for confusion. Objective:   Physical Exam  Constitutional:       Appearance: Normal appearance. He is well-developed. HENT:      Head: Normocephalic and atraumatic. Eyes:      Extraocular Movements: Extraocular movements intact. Conjunctiva/sclera: Conjunctivae normal.      Pupils: Pupils are equal, round, and reactive to light.    Neck:      Comments: No significant tenderness to palpation   Cardiovascular: Rate and Rhythm: Normal rate. Pulmonary:      Effort: Pulmonary effort is normal.   Abdominal:      General: There is no distension. Musculoskeletal:      Cervical back: Normal range of motion and neck supple. Skin:     General: Skin is warm and dry. Neurological:      Mental Status: He is alert. Comments: Alert and oriented x3  CN3-12 intact  Motor strength full  Sensation intact to light touch  No Hoffmans     Psychiatric:         Thought Content: Thought content normal.         Assessment: This is a 56year old male presenting with left arm pain and numbness/tingling into his fingers.        Plan:      -Obtain MRI cervical spine to evaluate for stenosis  -EMG UE ordered   -OARRS report reviewed   -Return to Neurosurgery clinic after completion of imaging to discuss results and further treatment plan  -Call/return sooner if symptoms worsen or new issues arise in the interim           Alla Johansen PA-C

## 2022-04-14 ENCOUNTER — OFFICE VISIT (OUTPATIENT)
Dept: FAMILY MEDICINE CLINIC | Age: 57
End: 2022-04-14
Payer: COMMERCIAL

## 2022-04-14 VITALS
WEIGHT: 279 LBS | SYSTOLIC BLOOD PRESSURE: 155 MMHG | BODY MASS INDEX: 34.69 KG/M2 | HEIGHT: 75 IN | TEMPERATURE: 97.4 F | HEART RATE: 84 BPM | DIASTOLIC BLOOD PRESSURE: 113 MMHG

## 2022-04-14 DIAGNOSIS — I10 PRIMARY HYPERTENSION: ICD-10-CM

## 2022-04-14 DIAGNOSIS — E55.9 VITAMIN D DEFICIENCY: ICD-10-CM

## 2022-04-14 DIAGNOSIS — E78.00 PURE HYPERCHOLESTEROLEMIA: ICD-10-CM

## 2022-04-14 DIAGNOSIS — E66.9 DIABETES MELLITUS TYPE 2 IN OBESE (HCC): Primary | ICD-10-CM

## 2022-04-14 DIAGNOSIS — M54.12 CERVICAL RADICULOPATHY: ICD-10-CM

## 2022-04-14 DIAGNOSIS — E11.69 DIABETES MELLITUS TYPE 2 IN OBESE (HCC): Primary | ICD-10-CM

## 2022-04-14 LAB
CREATININE URINE POCT: 200
HBA1C MFR BLD: 8.2 %
MICROALBUMIN/CREAT 24H UR: 10 MG/G{CREAT}
MICROALBUMIN/CREAT UR-RTO: <30

## 2022-04-14 PROCEDURE — 3052F HG A1C>EQUAL 8.0%<EQUAL 9.0%: CPT | Performed by: FAMILY MEDICINE

## 2022-04-14 PROCEDURE — 99214 OFFICE O/P EST MOD 30 MIN: CPT | Performed by: FAMILY MEDICINE

## 2022-04-14 PROCEDURE — 82044 UR ALBUMIN SEMIQUANTITATIVE: CPT | Performed by: FAMILY MEDICINE

## 2022-04-14 PROCEDURE — 83036 HEMOGLOBIN GLYCOSYLATED A1C: CPT | Performed by: FAMILY MEDICINE

## 2022-04-14 RX ORDER — LOSARTAN POTASSIUM AND HYDROCHLOROTHIAZIDE 25; 100 MG/1; MG/1
1 TABLET ORAL DAILY
Qty: 30 TABLET | Refills: 5 | Status: SHIPPED | OUTPATIENT
Start: 2022-04-14 | End: 2022-06-27 | Stop reason: SDUPTHER

## 2022-04-14 RX ORDER — GLIPIZIDE 5 MG/1
TABLET ORAL
Qty: 270 TABLET | Refills: 1 | Status: SHIPPED | OUTPATIENT
Start: 2022-04-14 | End: 2022-07-26 | Stop reason: SDUPTHER

## 2022-04-14 ASSESSMENT — ENCOUNTER SYMPTOMS
EYE ITCHING: 0
SHORTNESS OF BREATH: 0
SORE THROAT: 0
CONSTIPATION: 0
EYE PAIN: 0
COUGH: 0
BACK PAIN: 0
CHEST TIGHTNESS: 0
DIARRHEA: 0
ABDOMINAL PAIN: 0
RHINORRHEA: 0
NAUSEA: 0

## 2022-04-14 NOTE — PROGRESS NOTES
Carolyne Longoria. : 1965    Chief Complaint:     Chief Complaint   Patient presents with    Diabetes       HPI  Carolyne Longoria. 56 y.o. presents for   Chief Complaint   Patient presents with    Diabetes     Presents for follow-up. He continues to have issues with his diabetes and blood pressure. Blood pressure continues to be elevated. He has been taking his medications as prescribed. He is currently on losartan hydrochlorothiazide 5012-1/2 mg. He states blood pressure at home is usually in the 120s to 130s. However at work it is more elevated. It is elevated today. A1c continues to be high. He has been stable in the high sevens low eights. He has been taking the metformin, farxiga as well as glipizide. Due to his current issues in his neck he has not been exercising due to pain. He is going to get an MRI here in the near future. All questions were answered to patients satisfaction. Past Medical History:   Diagnosis Date    Diabetes mellitus ([facility] [address]     Encounter for screening colonoscopy     Hyperlipemia     Hypertension        No Known Allergies    Health Maintenance Due   Topic Date Due    Hepatitis C screen  Never done    Pneumococcal 0-64 years Vaccine (1 - PCV) Never done    HIV screen  Never done    Hepatitis B vaccine (1 of 3 - Risk 3-dose series) Never done    DTaP/Tdap/Td vaccine (1 - Tdap) Never done    Shingles Vaccine (1 of 2) Never done    Diabetic retinal exam  2020         REVIEW OF SYSTEMS  Review of Systems   Constitutional: Negative for chills, fatigue and fever. HENT: Negative for congestion, ear pain, postnasal drip, rhinorrhea and sore throat. Eyes: Negative for pain and itching. Respiratory: Negative for cough, chest tightness and shortness of breath. Cardiovascular: Negative for chest pain and leg swelling. Gastrointestinal: Negative for abdominal pain, constipation, diarrhea and nausea. Endocrine: Negative for heat intolerance. Genitourinary: Negative for flank pain, frequency and urgency. Musculoskeletal: Positive for neck pain. Negative for arthralgias and back pain. Skin: Negative for pallor and rash. Allergic/Immunologic: Negative for environmental allergies. Neurological: Negative for dizziness, numbness and headaches. Hematological: Does not bruise/bleed easily. Psychiatric/Behavioral: Negative for agitation, behavioral problems and hallucinations. PHYSICAL EXAM  BP (!) 155/113   Pulse 84   Temp 97.4 °F (36.3 °C)   Ht 6' 3\" (1.905 m)   Wt 279 lb (126.6 kg)   BMI 34.87 kg/m²   Physical Exam  Vitals reviewed. Constitutional:       Appearance: Normal appearance. He is normal weight. HENT:      Head: Normocephalic. Nose: Nose normal.   Eyes:      Extraocular Movements: Extraocular movements intact. Conjunctiva/sclera: Conjunctivae normal.   Cardiovascular:      Rate and Rhythm: Normal rate and regular rhythm. Pulses: Normal pulses. Pulmonary:      Effort: Pulmonary effort is normal.      Breath sounds: Normal breath sounds. Musculoskeletal:         General: Normal range of motion. Cervical back: Normal range of motion. Skin:     Findings: No rash. Neurological:      General: No focal deficit present. Mental Status: He is alert and oriented to person, place, and time. Mental status is at baseline. Psychiatric:         Mood and Affect: Mood normal.         Behavior: Behavior normal.         Thought Content: Thought content normal.         Judgment: Judgment normal.              Laboratory:   All laboratory and radiology results have been personally reviewed by myself    Lab Results   Component Value Date     03/17/2022    K 3.8 03/17/2022     03/17/2022    CO2 24 03/17/2022    BUN 8 03/17/2022    CREATININE 0.7 03/17/2022    PROT 6.9 03/17/2022    LABALBU 3.7 03/17/2022    LABALBU 4.4 05/15/2012    CALCIUM 9.1 03/17/2022    GFRAA >60 03/17/2022    LABGLOM >60 Educational materials and/or homeexercises printed for patient's review and were included in patient instructions on his/her After Visit Summary and given to patient at the end of visit. Counseled regarding above diagnosis, including possible risks and complications,  especially if left uncontrolled. Counseled regarding the possible side effects, risks, benefits and alternatives to treatment; patient and/or guardian verbalizes understanding, agrees,feels comfortable with and wishes to proceed with above treatment plan. Advised patient to call Olivia Oakley new medication issues, and read all Rx info from pharmacy to assure aware of all possible risks and side effects of medication before taking. Reviewed age and gender appropriate health screening exams and vaccinations. Advised patient regarding importance of keeping up with recommended health maintenance and toschedule as soon as possible if overdue, as this is important in assessing for undiagnosed pathology, especially cancer, as well as protecting against potentially harmful/life threatening disease. and/or guardian verbalizes understanding and agrees with above counseling, assessment and plan. All questions answered. Kar 5, DO  4/14/22    NOTE: This report was transcribed using voice recognition software.  Every effort was made to ensure accuracy; however, inadvertent computerized transcription errors may be present

## 2022-04-19 ENCOUNTER — HOSPITAL ENCOUNTER (OUTPATIENT)
Dept: MRI IMAGING | Age: 57
Discharge: HOME | End: 2022-04-21
Payer: COMMERCIAL

## 2022-04-19 DIAGNOSIS — M54.12 CERVICAL RADICULOPATHY: ICD-10-CM

## 2022-04-19 PROCEDURE — 72141 MRI NECK SPINE W/O DYE: CPT

## 2022-05-02 ENCOUNTER — TELEPHONE (OUTPATIENT)
Dept: FAMILY MEDICINE CLINIC | Age: 57
End: 2022-05-02

## 2022-05-02 NOTE — TELEPHONE ENCOUNTER
MEDICARE WELLNESS VISIT NOTE      HPI:   Stephen Dawkins presents for his Subsequent  Medicare Wellness Visit.   He has no current complaints or concerns.     Medication verified, no changes    Refills needed today? Yes         CHOLESTEROL (mg/dL)   Date Value   06/04/2016 113     HDL (mg/dL)   Date Value   06/04/2016 42     No components found for: CHOLHDLRATIO  TRIGLYCERIDE (mg/dL)   Date Value   06/04/2016 60     CALCULATED LDL (mg/dL)   Date Value   06/04/2016 59     Glucose (mg/dL)   Date Value   06/02/2017 84     PSA, Total (ng/mL)   Date Value   07/23/2015 1.27         Health Maintenance   Topic Date Due   • Pneumococcal Vaccine Adult (2 of 2 - PPSV23) 07/23/2016   • Medicare Wellness 65+  07/20/2017   • Zoster Vaccine  07/20/2017 (Originally 2/14/1992)   • DTaP/Tdap/Td Vaccine (1 - Tdap) 02/01/2019 (Originally 3/20/2009)   • Influenza Vaccine (1) 09/01/2017       Patient Care Team:  Nicolas Jain MD as PCP - General  Paco Carrillo MD (Gastroenterology)  Jadiel Dumont MD (Cardiovascular Disease)  Christiano Youssef MD as Consulting Physician (Internal Medicine - Clinical Cardiac Electrophysiology)  Elliot Marcial MD (General Surgery)  Gerardo Yancey MD (Orthopedic Surgery)        Patient Active Problem List    Diagnosis Date Noted   • Bifascicular block 06/10/2016     Priority: High   • Systolic HF (heart failure) (CMS/[facility]) 06/10/2016     Priority: High   • Mobitz type 2 second degree atrioventricular block 06/10/2016     Priority: High   • 1St degree AV block 06/10/2016     Priority: High   • CCM (congestive cardiomyopathy) (CMS/[facility]) 05/12/2016     Priority: Low   • Encounter for cardioversion procedure 07/08/2016     Cardioversion:    Successful DCCV of AF with 200 J    Pacer set to DDDR 60     • Medtronic Bi-V pacemaker: implanted 06/09/2016 06/10/2016     Implanted: 6/9/16  Pre-op Dx: Systolic CHF, Bifascicular block, 2nd degree AVB  Remote Monitor: Quick Key   Echo: 6/3/16;  LVEF: 25%        •  Notified. History of cardioversion 06/07/2016 06/07/16 Successful DCCV 360 J to SR    Continue amiodarone loading and anticoagulation    May go home from my standpoint this afternoon  Amiodarone 400 mg BID for 2 weeks then daily for another 2 weeks then 200 mg daily    Follow-up 1 mo     • Vitamin D insufficiency 06/22/2011   • Macrocytosis 07/01/2010   • Emphysema/COPD (CMS/HCC) 06/30/2010   • Personal history of colonic polyps 12/24/2007     adenoma 2004     • Hypertrophy of prostate with urinary obstruction and other lower urinary tract symptoms (LUTS) 12/19/2006   • Esophageal reflux 10/05/2004   • Other and unspecified hyperlipidemia 03/17/2003   • Osteoarthrosis, unspecified whether generalized or localized, unspecified site 08/02/2001   • Contracture of palmar fascia 08/02/2001         Past Medical History:   Diagnosis Date   • Atrial fibrillation (CMS/HCC)    • Congestive cardiac failure (CMS/[facility])    • Contracture of palmar fascia     bilateral   • Emphysema/COPD (CMS/HCC) 6/30/2010   • Encounter for cardioversion procedure 7/8/2016    Cardioversion:  Successful DCCV of AF with 200 J  Pacer set to DDDR 60   • Hypertrophy (benign) of prostate     mild   • Macrocytosis 7/1/2010   • Mixed hyperlipidemia     Hyperlipidemia   • Osteoarthrosis, unspecified whether generalized or localized, unspecified site     Degenerative joint disease   • Personal history of colonic polyps 1986    benign hyperplastic polyps   • RAD (reactive airway disease)    • Unspecified essential hypertension    • Vitamin D insufficiency 6/22/2011         Past Surgical History:   Procedure Laterality Date   • APPENDECTOMY     • CARDIOVERSION  06/07/2016   • COLONOSCOPY W BIOPSY  4/26/04    Dr Swift polyps, hx of polyps f/u 5 yrs   • ESOPHAGOGASTRODUODENOSCOPY TRANSORAL FLEX DIAG  8/2/04    Dr Swift esophagitis, dysphagia, abnormal UGI   • ESOPHAGOGASTRODUODENOSCOPY TRANSORAL FLEX W/BX SINGLE OR MULT  4/14/2014    Dr Carrillo dilated   • EYE SURGERY      • [facility]     Recommended follow up:  None    See orders   See Patient Instructions sectionNo Follow-up on file.      7/20/2017  Above reviewed  LION Jain MD

## 2022-05-02 NOTE — TELEPHONE ENCOUNTER
----- Message from Wevod sent at 5/2/2022 10:05 AM EDT -----  Subject: Message to Provider    QUESTIONS  Information for Provider? Patients BP has been running 120-80 or just   under; if you need to schedule an appt please call to let him know.  ---------------------------------------------------------------------------  --------------  CALL BACK INFO  What is the best way for the office to contact you? OK to leave message on   voicemail  Preferred Call Back Phone Number? [phone]  ---------------------------------------------------------------------------  --------------  SCRIPT ANSWERS  Relationship to Patient?  Self

## 2022-05-26 ENCOUNTER — HOSPITAL ENCOUNTER (OUTPATIENT)
Dept: NEUROLOGY | Age: 57
Discharge: HOME | End: 2022-05-26
Payer: COMMERCIAL

## 2022-05-26 VITALS — HEIGHT: 75 IN | BODY MASS INDEX: 33.82 KG/M2 | WEIGHT: 272 LBS

## 2022-05-26 DIAGNOSIS — M54.12 CERVICAL RADICULOPATHY: ICD-10-CM

## 2022-05-26 PROCEDURE — 95886 MUSC TEST DONE W/N TEST COMP: CPT | Performed by: PSYCHIATRY & NEUROLOGY

## 2022-05-26 PROCEDURE — 95913 NRV CNDJ TEST 13/> STUDIES: CPT | Performed by: PSYCHIATRY & NEUROLOGY

## 2022-05-26 PROCEDURE — 95886 MUSC TEST DONE W/N TEST COMP: CPT

## 2022-05-26 PROCEDURE — 95913 NRV CNDJ TEST 13/> STUDIES: CPT

## 2022-05-26 NOTE — PROCEDURES
Rec. Site Peak Lat NP Amp PP Amp Segments Peak Diff Temp. ms µV µV  ms °C   R Median - CSI      Median Thumb 3.44 1.9 9.5 Median - Radial 0.73 33.1      Radial Thumb 2.71 5.0 5.0 Median - Ulnar 0.68 33.1      Median Ring 3.91 8.9 12.7 Median palm - Ulnar palm 0.57 33.3      Ulnar Ring 3.23 1.8 12.0         Median palm Wrist 2.34 22.8 59.8         Ulnar palm Wrist 1.77 10.5 14.4         CSI     CSI 1.98    L Median - CSI      Median Thumb 3.49 15.6 25.4 Median - Radial 0.89 33      Radial Thumb 2.60 6.2 8.9 Median - Ulnar 0.94 33      Median Ring 4.11 6.4 10.5 Median palm - Ulnar palm 0.57 33.1      Ulnar Ring 3.18 6.9 6.1         Median palm Wrist 2.34 43.3 43.8         Ulnar palm Wrist 1.77 7.2 9.6         CSI     CSI 2.40            F  Wave      Nerve F Lat M Lat F-M Lat    ms ms ms   R Median - APB 32.3 3.9 28.4   R Ulnar - ADM 32.4 2.4 30.0   L Median - APB 34.1 4.0 30.1   L Ulnar - ADM 34.5 3.2 31.3         EMG         EMG Summary Table     Spontaneous MUAP Recruitment   Muscle IA Fib PSW Fasc H.F. Amp Dur. PPP Pattern   L. First dorsal interosseous N None None None None N N N N   L. Abductor pollicis brevis N None None None None N N N N   L. Flexor pollicis longus N None None None None N N N N   L. Extensor indicis proprius N None None None None N N N N   L. Flexor digitorum profundus (Ulnar) N None None None None N N N N   L. Pronator teres Sl Incr Few Few None None N 2+ 2+ Sl Decr/R   L. Brachioradialis N None None None None N N N N   L. Biceps brachii Sl Incr 1+ Rare None None N N N N   L. Triceps brachii N None None None None N N N N   L. Deltoid N None None None None N 1+ 1+ Sl Decr/R   L. Cervical paraspinals (low) Incr 1+ Few None None N N N N   L. Cervical paraspinals (mid) N None None None None N N N N       Nerve conduction studies in both arms displayed moderately increased CSI values in both median nerves.     These findings were compared to the referential values in this laboratory, available upon request.    Monopolar needle examination of the left arm revealed acute and chronic denervation changes in the muscle supplied primarily by the C6 motor root. Needle testing of the paraspinals found acute denervation changes. Electrodiagnostic examination of both arms disclosed evidence diagnostic of the following---    1. Bilateral median neuropathies at/or distal to the wrists, of minimal severity. These findings were consistent with carpal tunnel syndrome. 2.  Left C6 motor radiculopathy or intracanalicular lesion, proven by the abnormal needle testing of the paraspinals, and with acute and chronic denervation changes. There were no other peripheral neuropathies. There were no other motor radiculopathies or intracanalicular lesions. Sensory radiculopathies cannot be evaluated by electrodiagnostic means. Clinically, the patient presented with neck pains radiating into the left arm, forearm and lateral hand. Recent MRIs revealed left C5-C6 neuroforaminal stenosis. On brief neurological examination, I found no motor or sensory compromise. His difficulties are related to his C6 radicular disease. The minimal carpal tunnel syndrome may be contributing. Clinical correlation was highly advised.

## 2022-06-09 ENCOUNTER — OFFICE VISIT (OUTPATIENT)
Dept: NEUROSURGERY | Age: 57
End: 2022-06-09
Payer: COMMERCIAL

## 2022-06-09 VITALS
WEIGHT: 272 LBS | SYSTOLIC BLOOD PRESSURE: 133 MMHG | BODY MASS INDEX: 33.82 KG/M2 | HEART RATE: 117 BPM | DIASTOLIC BLOOD PRESSURE: 105 MMHG | HEIGHT: 75 IN

## 2022-06-09 DIAGNOSIS — M54.12 LEFT CERVICAL RADICULOPATHY: Primary | ICD-10-CM

## 2022-06-09 PROCEDURE — 99212 OFFICE O/P EST SF 10 MIN: CPT

## 2022-06-09 PROCEDURE — 99212 OFFICE O/P EST SF 10 MIN: CPT | Performed by: NEUROLOGICAL SURGERY

## 2022-06-09 NOTE — PROGRESS NOTES
Patient is here for follow up consult for: left thumb and index finger pain and numbness. It is better. Physical exam  Alert and Oriented X3  PERRLA, EOMI  MAYA 5/5  Sensation intact to LT and PP  Reflexes are 2+ and symmetric    A/P: patient is here for follow up for: left pain and index finger numbness. His MRI shows severe left foraminal stenosis at C5-C6 and his EMG shows C6 radiculopathy. His symptoms are minimal. We will try physical therapy. This does not explain his right shoulder symptoms.       Liza Meehan MD

## 2022-06-13 RX ORDER — MONTELUKAST SODIUM 10 MG/1
TABLET ORAL
Qty: 90 TABLET | Refills: 1 | Status: SHIPPED | OUTPATIENT
Start: 2022-06-13

## 2022-06-23 ENCOUNTER — EVALUATION (OUTPATIENT)
Dept: PHYSICAL THERAPY | Age: 57
End: 2022-06-23
Payer: COMMERCIAL

## 2022-06-23 DIAGNOSIS — M54.12 LEFT CERVICAL RADICULOPATHY: Primary | ICD-10-CM

## 2022-06-23 PROCEDURE — 97140 MANUAL THERAPY 1/> REGIONS: CPT | Performed by: PHYSICAL THERAPIST

## 2022-06-23 PROCEDURE — 97161 PT EVAL LOW COMPLEX 20 MIN: CPT | Performed by: PHYSICAL THERAPIST

## 2022-06-23 NOTE — PROGRESS NOTES
[facility]   Phone: [phone]   Fax: [phone]    Patient: Alisia Mehta. : 1965  MRN: [id number]  Referring Provider: Sofía Lopez MD  [address] [place] [address]. [facility],  [address] [facility]     Medical Diagnosis:      Diagnosis Orders   1. Left cervical radiculopathy          SUBJECTIVE:     Onset date: 22    Onset[de-identified] Insidious onset    Mechanism of Injury: Pt developed numbness and tingling in the left forearm and 1st 2 digits of the left hand about 1-2 months ago. Previous PT: none    Medical Management for Current Problem: N/A    Chief complaint: numbness left forearm and hand    Behavior: condition is staying the same    Pain: no pain reported, just numbness  Current: 0/10     Best: 0/10     Worst:0/10    Symptom Type/Quality: numbness  Location[de-identified] Neck:  Numbness left forearm and 1st and 2nd digit hand, intermittent        Provoking Activities/Positions: resting arm on a surface, work activities, sleeping on right side                 Relieving Activities/Positions:  shaking arm/hand     Disturbed Sleep: no    Imaging results: No results found.     Past Medical History:  Past Medical History:   Diagnosis Date    Diabetes mellitus ([facility]     Encounter for screening colonoscopy     Hyperlipemia     Hypertension      Past Surgical History:   Procedure Laterality Date    COLONOSCOPY N/A 2019    COLORECTAL CANCER SCREENING, NOT HIGH RISK performed by Cabrera Bright MD at [place] [address]      22yrs ago    UPPER GASTROINTESTINAL ENDOSCOPY  12    ESOPHAGOGASTRODUODENOSCOPY       Medications:   Current Outpatient Medications   Medication Sig Dispense Refill    montelukast (SINGULAIR) 10 MG tablet TAKE ONE TABLET BY MOUTH NIGHTLY 90 tablet 1    losartan-hydroCHLOROthiazide (HYZAAR) 100-25 MG per tablet Take 1 tablet by mouth daily 30 tablet 5    glipiZIDE (GLUCOTROL) 5 MG tablet Take 2 tabs in the morning and one at night 270 tablet 1    dapagliflozin (FARXIGA) 10 MG tablet Take 1 tablet by mouth every morning 90 tablet 3    metFORMIN (GLUCOPHAGE-XR) 500 MG extended release tablet Take 2 tablets by mouth 2 times daily 360 tablet 1    simvastatin (ZOCOR) 10 MG tablet TAKE 1 TABLET BY MOUTH ONE TIME A DAY 90 tablet 1    Cholecalciferol (VITAMIN D3) 5000 UNITS TABS Take by mouth daily LD 7/25/19      KRILL OIL PO Take by mouth daily  7/25/19      aspirin 81 MG tablet Take 81 mg by mouth daily LD 7/25/19 (takes for healthy living)      Multiple Vitamins-Minerals (CENTRUM PO) Take 1 tablet by mouth daily LD 7/25/19       No current facility-administered medications for this visit. 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 Status: full time. Exercise regimen: none    Hobbies: golfing    Patient Goals: relief of numbness    Contraindications/Precautions: none    OBJECTIVE:     Observations: well nourished male    Inspection: normal orthopedic exam        Range of Motion:    Neck: WFL throughout     Upper Extremity:   Right:   [x] Normal   [] Limited    Left:   [x] Normal   [] Limited     Strength:     Neck: 5/5   R UE: 5/5   L UE: 5/5    Palpation: No tenderness     Sensation: intact to light touch and temperature.     Special Tests:   [x] Nerve Root Compression           Right []+ / [] -    Left []+ / [x] -  [x] Cervical Distraction []+ / [x] -    [] Spurling's Test           Right []+ / [] -    Left []+ / [] -     [x] Flexion/rotation test :           Right []+ / [] -    Left [x]+ / [] -    [] Other: []+ / [] -      Special Test Comments: N/A    ASSESSMENT     Outcome Measure:   Neck Disability Index 4% disability     Problems:    Numbness L UE   Decreased functional ability with work, sleeping    Reason for Skilled Care: Pt with intermittent     [x] There are no barriers affecting plan of care or recovery    [] Barriers to this patient's plan of care or recovery include. Domestic Concerns:  [x] No  [] Yes:    Long Term goals (4 weeks)   Decrease reported c/o numbness by 50%   Able to perform/complete the following functions/tasks: pt will be able to sleep and perform work activities without significant onset of numbness   Independent with Home Exercise Programs    Rehab Potential: [x] Good  [] Fair  [] Poor    PLAN       Treatment Plan:   [x] Therapeutic Exercise  [x] Therapeutic Activity  [x] Neuromuscular Re-education   [] Gait Training  [] Balance Training  [] Aerobic conditioning  [] Manual Therapy  [] Massage/Fascial release   [] Work/Sport specific activities    [] Pain Neuroscience [] Cold/hotpack  [] Vasocompression  [] Electrical Stimulation  [] Lumbar/Cervical Traction  [] Ultrasound   [] Iontophoresis: 4 mg/mL Dexamethasone Sodium Phosphate 40-80 mAmin  [] Dry Needling      [x] Instruction in HEP      []  Medication allergies reviewed for use of Dexamethasone Sodium Phosphate 4mg/ml  with iontophoresis treatments. Patient is not allergic. The following CPT codes are likely to be used in the care of this patient: 40861 PT Evaluation: Low Complexity, 39866 PT Re-Evaluation, 37381 Therapeutic Exercise, [id number] Neuromuscular Re-Education, 46661 Therapeutic Activities and 73799 Manual Therapy      Suggested Professional Referral: [x] No  [] Yes:     Patient Education:  [x] Plans/Goals, Risks/Benefits discussed  [x] Home exercise program  Method of Education: [x] Verbal  [x] Demo  [x] Written  Comprehension of Education:  [x] Verbalizes understanding. [x] Demonstrates understanding. [] Needs Review. [] Demonstrates/verbalizes understanding of HEP/Ed previously given. Frequency:  1-2 days per week for 4 weeks    Patient understands diagnosis/prognosis and consents to treatment, plan and goals: [x] Yes    [] No     Thank you for the opportunity to work with your patient.   If you have questions or comments, please contact me at number listed above.    Electronically signed by: Shannon Lucas, [id number]    Medicare Patients Only     Please sign Physician's Certification and return to: Linda Rojas  [facility] PHYSICAL [address] [facility] [id number]  Dept: [phone]  Dept Fax: [phone]  Loc: [phone] Certification / Comments     Frequency/Duration 1-2 days per week for 4 weeks. Certification period from 6/23/2022  to 9/22/22. I have reviewed the Plan of Care established for skilled therapy services and certify that the services are required and that they will be provided while the patient is under my care.     Physician's Comments/Revisions:               Physician's Printed Name:                                           [de-identified] Signature:                                                               Date:

## 2022-06-23 NOTE — PROGRESS NOTES
[facility]          Phone: [phone] Fax: [phone]    Physical Therapy Daily Treatment Note  Date:  2022    Patient Name:  Karissa Todd :  1965  MRN: [id number]    Evaluating Therapist:  Jace Fajardo, [place] [id number]    Restrictions/Precautions:    Diagnosis:     Diagnosis Orders   1. Left cervical radiculopathy       Treatment Diagnosis:    Insurance/Certification information:  [facility]  Referring Physician:  Pam Vera MD  Plan of care signed (Y/N):    Visit# / total visits:  -  Pain level: 0/10   Time In:  1300  Time Out:  1340    Subjective:  See evaluation    Exercises:  Exercise/Equipment Resistance/Repetitions Other comments     Nerve glides       Scapular retraction       Chin tucks                                                                                                                          Other Therapeutic Activities:  PT evaluation completed. Pt educated on positioning during sleep with the use of pillows and with workstation modification for proper posture during work activities.      Home Exercise Program:  N/A    Manual Treatments:  MFR, manual cervical traction, and left arm pull x 15 minutes    Modalities:  N/A     Time-in Time-out Total Time   24752  Evaluation Low Complexity 1300 1325 25   91626  Evaluation Med Complexity      00451  Evaluation High Complexity      84672  Ther Ex      24314  Neuro Re-ed        36852  Ther Activities        76956  Manual Therapy  1179 2978 15   88666  E-stim       47308  Ultrasound            Session 1300 1340 40       Treatment/Activity Tolerance:  [x] Patient tolerated treatment well [] Patient limited by fatigue  [] Patient limited by pain  [] Patient limited by other medical complications  [] Other:     Prognosis: [x] Good [] Fair  [] Poor    Patient Requires Follow-up: [x] Yes  [] No    Plan:   [] Continue per plan of care [] Alter current plan (see comments)  [x] Plan of care initiated [] Hold pending MD visit [] Discharge  Plan for Next Session:        Electronically signed by:  Joselin Paz, [place], [id number]

## 2022-06-27 ENCOUNTER — TELEPHONE (OUTPATIENT)
Dept: FAMILY MEDICINE CLINIC | Age: 57
End: 2022-06-27

## 2022-06-27 RX ORDER — LOSARTAN POTASSIUM AND HYDROCHLOROTHIAZIDE 25; 100 MG/1; MG/1
1 TABLET ORAL DAILY
Qty: 90 TABLET | Refills: 1 | Status: SHIPPED | OUTPATIENT
Start: 2022-06-27

## 2022-06-27 NOTE — TELEPHONE ENCOUNTER
On 4/14/22 pt was seem by Dr. Sheryl Polk at that time she changed his script from Losartan 50 mg to 100 mg

## 2022-06-27 NOTE — TELEPHONE ENCOUNTER
Pt has been taking Losartan 50 mg, BID, although the script states 1 dariusz can we look into this and make sure of correct dosage and  We would need too send a new script if it is BID

## 2022-06-29 ENCOUNTER — TREATMENT (OUTPATIENT)
Dept: PHYSICAL THERAPY | Age: 57
End: 2022-06-29
Payer: COMMERCIAL

## 2022-06-29 ENCOUNTER — TELEPHONE (OUTPATIENT)
Dept: ORTHOPEDIC SURGERY | Age: 57
End: 2022-06-29

## 2022-06-29 DIAGNOSIS — M54.12 LEFT CERVICAL RADICULOPATHY: Primary | ICD-10-CM

## 2022-06-29 DIAGNOSIS — M75.111 INCOMPLETE TEAR OF RIGHT ROTATOR CUFF, UNSPECIFIED WHETHER TRAUMATIC: Primary | ICD-10-CM

## 2022-06-29 PROCEDURE — 97140 MANUAL THERAPY 1/> REGIONS: CPT | Performed by: PHYSICAL THERAPIST

## 2022-06-29 NOTE — PROGRESS NOTES
[facility]          Phone: [phone] Fax: [phone]    Physical Therapy Daily Treatment Note  Date:  2022    Patient Name:  Sharif Lorenzo. :  1965  MRN: [id number]    Evaluating Therapist:  Mela Perales, [place] [id number]    Restrictions/Precautions:    Diagnosis:     Diagnosis Orders   1.  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    Exercises:  Exercise/Equipment Resistance/Repetitions Other comments     Nerve glides       Scapular retraction       Chin tucks       Corner stretch 20 sec x 3 reps                                                                                                                  Other Therapeutic Activities:       Home Exercise Program:  N/A    Manual Treatments:  MFR, manual cervical traction, and left arm pull x 23 minutes    Modalities:  N/A     Time-in Time-out Total Time   70960  Evaluation Low Complexity      92372  Evaluation Med Complexity      79392  Evaluation High Complexity      03242  Ther Ex 1059 1103 4   03993  Neuro Re-ed        91757  Ther Activities        09734  Manual Therapy  4300 1536 67   65369  E-stim       62177  Ultrasound            Session 1036 1103 27       Treatment/Activity Tolerance:  [x] Patient tolerated treatment well [] Patient limited by fatigue  [] Patient limited by pain  [] Patient limited by other medical complications  [] Other:     Prognosis: [x] Good [] Fair  [] Poor    Patient Requires Follow-up: [x] Yes  [] No    Plan:   [x] Continue per plan of care [] Alter current plan (see comments)  [] Plan of care initiated [] Hold pending MD visit [] Discharge  Plan for Next Session: Electronically signed by:  Orville Quintanilla, [place], [id number]

## 2022-06-29 NOTE — TELEPHONE ENCOUNTER
Pt called into the office today and left a VM stating that he has attempted to call our office 3X with no answer back yet. .    Pt is inquiring for a PT script for his R Arm. . he states he is already doing PT for his L arm under a [facility] order, and that if he gets a script for his R arm they will begin treating him for that as well. Documented for review.

## 2022-07-05 ENCOUNTER — TREATMENT (OUTPATIENT)
Dept: PHYSICAL THERAPY | Age: 57
End: 2022-07-05
Payer: COMMERCIAL

## 2022-07-05 DIAGNOSIS — M54.12 LEFT CERVICAL RADICULOPATHY: Primary | ICD-10-CM

## 2022-07-05 PROCEDURE — 97110 THERAPEUTIC EXERCISES: CPT | Performed by: PHYSICAL THERAPIST

## 2022-07-05 PROCEDURE — 97140 MANUAL THERAPY 1/> REGIONS: CPT | Performed by: PHYSICAL THERAPIST

## 2022-07-05 NOTE — PROGRESS NOTES
[facility]          Phone: [phone] Fax: [phone]    Physical Therapy Daily Treatment Note  Date:  2022    Patient Name:  Elaine Munguia. :  1965  MRN: [id number]    Evaluating Therapist:  Win Jaimes, [place] [id number]    Restrictions/Precautions:    Diagnosis:     Diagnosis Orders   1. Left cervical radiculopathy       Treatment Diagnosis:    Insurance/Certification information:  [facility]  Referring Physician:  Victor Hugo Pierre MD  Plan of care signed (Y/N):    Visit# / total visits:  3/4-8  Pain level: 0/10   Time In:  1115  Time Out:  1145    Subjective:  Pt denies any numbness or other symptoms in the left arm since starting therapy. Pt with new script from Dr. India Rockwell for incomplete rotator cuff tear in the right shoulder.      Exercises:  Exercise/Equipment Resistance/Repetitions Other comments     Nerve glides       Scapular retraction       Chin tucks       Corner stretch 20 sec x 3 reps      UBE 5 minutes                                                                                                           Other Therapeutic Activities:       Home Exercise Program:  N/A    Manual Treatments:  MFR, manual cervical traction, and left arm pull x 20 minutes    Modalities:  N/A     Time-in Time-out Total Time   77903  Evaluation Low Complexity      00311  Evaluation Med Complexity      32853  Evaluation High Complexity      35709  Ther Ex 1135 1145 10   34445  Neuro Re-ed        85364  Ther Activities        39101  Manual Therapy  7348 6433 20   61081  E-stim       75176  Ultrasound            Session 6195 9150 33       Treatment/Activity Tolerance:  [x] Patient tolerated treatment well [] Patient limited by fatigue  [] Patient limited by pain  [] Patient limited by other medical complications  [] Other:     Prognosis: [x] Good [] Fair  [] Poor    Patient Requires Follow-up: [x] Yes  [] No    Plan:   [x] Continue per plan of care [] Alter current plan (see comments)  [] Plan of care initiated [] Hold pending MD visit [] Discharge  Plan for Next Session:  Will evaluate and initiate treatment to right shoulder next visit      Electronically signed by:  Shannon Lacey, [id number]

## 2022-07-07 ENCOUNTER — EVALUATION (OUTPATIENT)
Dept: PHYSICAL THERAPY | Age: 57
End: 2022-07-07
Payer: COMMERCIAL

## 2022-07-07 DIAGNOSIS — M75.111 NONTRAUMATIC INCOMPLETE TEAR OF RIGHT ROTATOR CUFF: Primary | ICD-10-CM

## 2022-07-07 PROCEDURE — 97035 APP MDLTY 1+ULTRASOUND EA 15: CPT | Performed by: PHYSICAL THERAPIST

## 2022-07-07 PROCEDURE — 97161 PT EVAL LOW COMPLEX 20 MIN: CPT | Performed by: PHYSICAL THERAPIST

## 2022-07-07 NOTE — PROGRESS NOTES
[facility]          Phone: [phone] Fax: [phone]    Physical Therapy Daily Treatment Note  Date:  2022    Patient Name:  Jas Baum. :  1965  MRN: [id number]    Evaluating Therapist:  Luna Cornejo, [place] [id number]    Restrictions/Precautions:    Diagnosis:     Diagnosis Orders   1. Nontraumatic incomplete tear of right rotator cuff       Treatment Diagnosis:    Insurance/Certification information:  [facility]  Referring Physician:  KODAK Norris  Plan of care signed (Y/N):    Visit# / total visits:  -  Pain level: 5/10   Time In:  1335  Time Out:  1400    Subjective:  Pt denies any symptoms in the L UE today. Evaluation of R UE performed on this date. See evaluation for subjective report.     Exercises:  Exercise/Equipment Resistance/Repetitions Other comments     Biceps stretch in doorway 20 sec x 3 reps                                                                                                                                       Other:  kinesiotaping of right biceps completed after US    Home Exercise Program:  N/A    Manual Treatments:  N/A    Modalities:  US to right biceps, 50%, 1.0 MHz, 1.2 W/cm², x 8 minutes     Time-in Time-out Total Time   29696  Evaluation Low Complexity 1335 1345 10   41742  Evaluation Med Complexity      14702  Evaluation High Complexity      44923  Ther Ex 1355 1400 5   78551  Neuro Re-ed        05445  Ther Activities        59830  Manual Therapy       84450  E-stim       83396  Ultrasound 4300 7957 10         Session 1335 1400 25       Treatment/Activity Tolerance:  [x] Patient tolerated treatment well [] Patient limited by fatigue  [] Patient limited by pain  [] Patient limited by other medical complications  [] Other:     Prognosis: [x] Good [] Fair  [] Poor    Patient Requires Follow-up: [x] Yes  [] No    Plan:   [] Continue per plan of care [] Alter current plan (see comments)  [x] Plan of care initiated [] Hold pending MD visit [] Discharge  Plan for Next Session:        Electronically signed by:  Zaira Fraier, [id number] [facility], [id number]

## 2022-07-07 NOTE — PROGRESS NOTES
[facility]   Phone: [phone]   Fax: [phone]           Date:  2022   Patient: Rosa Hebert. : 1965  MRN: [id number]  Referring Provider: BREANNE De Leon - CNP  [address] [facility]     Medical Diagnosis:      Diagnosis Orders   1. Nontraumatic incomplete tear of right rotator cuff          SUBJECTIVE:     Onset date: 22    Onset[de-identified] Insidious onset    Mechanism of Injury / History: Pt reports he developed right arm/biceps pain about 1 year ago while doing rehab for hi rotator cuff. Patient is left handed. Previous PT: none    Medical Management for Current Problem: N/A    Chief complaint: pain    Behavior: condition is staying the same    Pain: constant  Current: 5/10        Aggravated by: reaching out, reaching behind back    Relieved by: rest    Symptom Type/Quality: \"it feels like the muscle is tearing\"  Location[de-identified] anterior, throughout the right biceps        Imaging results: No results found.     Past Medical History:  Past Medical History:   Diagnosis Date    Diabetes mellitus ([facility] [address]     Encounter for screening colonoscopy     Hyperlipemia     Hypertension      Past Surgical History:   Procedure Laterality Date    COLONOSCOPY N/A 2019    COLORECTAL CANCER SCREENING, NOT HIGH RISK performed by Estela Land MD at [place] [address]      22yrs ago    UPPER GASTROINTESTINAL ENDOSCOPY  12    ESOPHAGOGASTRODUODENOSCOPY       Medications:   Current Outpatient Medications   Medication Sig Dispense Refill    losartan-hydroCHLOROthiazide (HYZAAR) 100-25 MG per tablet Take 1 tablet by mouth daily 90 tablet 1    montelukast (SINGULAIR) 10 MG tablet TAKE ONE TABLET BY MOUTH NIGHTLY 90 tablet 1    glipiZIDE (GLUCOTROL) 5 MG tablet Take 2 tabs in the morning and one at night 270 tablet 1    dapagliflozin (FARXIGA) 10 MG tablet Take 1 tablet by mouth every morning 90 tablet 3    metFORMIN (GLUCOPHAGE-XR) 500 MG extended release tablet Take 2 tablets by mouth 2 times daily 360 tablet 1    simvastatin (ZOCOR) 10 MG tablet TAKE 1 TABLET BY MOUTH ONE TIME A DAY 90 tablet 1    Cholecalciferol (VITAMIN D3) 5000 UNITS TABS Take by mouth daily  7/25/19      KRILL OIL PO Take by mouth daily  7/25/19      aspirin 81 MG tablet Take 81 mg by mouth daily  7/25/19 (takes for healthy living)      Multiple Vitamins-Minerals (CENTRUM PO) Take 1 tablet by mouth daily  7/25/19       No current facility-administered medications for this visit. 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 Status: full time.     Exercise regimen: none    Hobbies: golfing    Patient Goals: pain relief    Precautions/Contraindications: none    OBJECTIVE:     Observations: well nourished male    Inspection: normal orthopedic exam.                   Palpation: Tender to palpation at biceps tendon/muscle     Joint/Motion:  Right Shoulder:  AROM: WNL    Left Shoulder:  AROM: WNL    Strength:  Right Shoulder: Flexion 5/5,  Abduction 5/5, ER 5/5, IR 5/5    Right Elbow:Flexion 5/5,  Extension 5/5    Left Shoulder: Flexion 5/5,  Abduction 5/5, ER 5/5, IR 5/5   Left Elbow:Flexion 5/5,  Extension 5/5    Special Tests/Functional Screens:    [] Miguel []+ / [] -  [] Bruceton's []+ / [] -   [] AC Sheer []+ / [] -    [] [facility] draw []+ / [] -    [] Bicep Load []+ / [] -   [] Crank []+ / [] -  [] Clunk []+ / [] -  [] [place] []+ / [] -   [] Swapnil Pederson []+ / [] -  [] Nehenry's []+ / [] -      [x] Speed's [x]+ / [] -   [] sÓcar's []+ / [] -    [] Sulcus Sign []+ / [] -   [] Apprehension []+ / [] -   [] Bicep Load II []+ / [] -   [] Elbow Valgus []+ / [] -     [] Elbow Varus []+ / [] -   [] Gene's relocation []+ / [] -   [] Empty Can []+ / [] -  [] Drop arm []+ / [] -  [] ER lag []+ / [] -  [] Painful Arc []+ / [] -  [] Priya Corado []+ / [] -  [] Belly Press/ lift off[]+ / [] -  [] Other: []+ / [] -       Special Test Comments:      ASSESSMENT     Outcome Measure:   QuickDASH (Disorders of the Arm, Shoulder, and Hand N/A    Problems:    Pain reported 5/10   Decreased functional ability with use of right upper extremity, reaching, lifting    Reason for Skilled Care: Pt with pain right biceps which is interfering with pt ability to use R UE for daily tasks. [x] There are no barriers affecting plan of care or recovery    [] Barriers to this patient's plan of care or recovery include. Domestic Concerns:  [x] No  [] Yes:    Long Term goals (3-4 weeks)   Decrease reported pain to 0-3/10   Able to perform/complete the following functions/tasks: pt will be able to reach out and behind with R UE with feeling like muscle is tearing   Independent with Home Exercise Programs    Rehab Potential: [x] Good  [] Fair  [] Poor    PLAN       Treatment Plan:   [x] Therapeutic Exercise  [x] Therapeutic Activity  [x] Neuromuscular Re-education   [] Gait Training  [] Balance Training  [] Aerobic conditioning  [x] Manual Therapy  [] Massage/Fascial release   [] Work/Sport specific activities    [] Pain Neuroscience [x] Cold/hotpack  [] Vasocompression  [x] Electrical Stimulation  [] Lumbar/Cervical Traction  [x] Ultrasound   [] Iontophoresis: 4 mg/mL Dexamethasone Sodium Phosphate 40-80 mAmin  [] Dry Needling      [x] Instruction in HEP      []  Medication allergies reviewed for use of Dexamethasone Sodium Phosphate 4mg/ml  with iontophoresis treatments. Patient is not allergic.       The following CPT codes are likely to be used in the care of this patient: 50261 PT Evaluation: Low Complexity, 28028 PT Re-Evaluation, 10742 Therapeutic Exercise, 90101 Neuromuscular Re-Education, 71421 Therapeutic Activities, 90447 Manual Therapy, 24217 Electric Stimulation and 46299 Ultrasound      Suggested Professional Referral: [x] No  [] Yes:     Patient Education:  [x] Plans/Goals, Risks/Benefits discussed  [x] Home exercise program  Method of Education: [x] Verbal  [x] Demo  [x] Written  Comprehension of Education:  [x] Verbalizes understanding. [x] Demonstrates understanding. [] Needs Review. [] Demonstrates/verbalizes understanding of HEP/Ed previously given. Frequency:  1-2 days per week for 3-4 weeks    Patient understands diagnosis/prognosis and consents to treatment, plan and goals: [x] Yes    [] No     Thank you for the opportunity to work with your patient. If you have questions or comments, please contact me at numbers listed above. Electronically signed by: Osman Negrete, [address] [place] [address], [id number]    Medicare Patients Only     Please sign Physician's Certification and return to: [address] [facility]  [address] [place] [id number]  Dept: [phone]  Dept Fax: [phone]  Loc: [phone] Certification / Comments     Frequency/Duration 1-2 days per week for 3-4 weeks. Certification period from 7/7/2022  to 10/6/22. I have reviewed the Plan of Care established for skilled therapy services and certify that the services are required and that they will be provided while the patient is under my care.     Physician's Comments/Revisions:               Physician's Printed Name:                                           [de-identified] Signature:                                                               Date:

## 2022-07-22 ENCOUNTER — TREATMENT (OUTPATIENT)
Dept: PHYSICAL THERAPY | Age: 57
End: 2022-07-22
Payer: COMMERCIAL

## 2022-07-22 DIAGNOSIS — M75.111 NONTRAUMATIC INCOMPLETE TEAR OF RIGHT ROTATOR CUFF: Primary | ICD-10-CM

## 2022-07-22 PROCEDURE — 97035 APP MDLTY 1+ULTRASOUND EA 15: CPT | Performed by: PHYSICAL THERAPIST

## 2022-07-22 PROCEDURE — 97124 MASSAGE THERAPY: CPT | Performed by: PHYSICAL THERAPIST

## 2022-07-22 NOTE — PROGRESS NOTES
[facility]          Phone: [phone] Fax: [phone]    Physical Therapy Daily Treatment Note  Date:  2022    Patient Name:  Moon Wilder. :  1965  MRN: [id number]    Evaluating Therapist:  Bala Hansen PT [id number]    Restrictions/Precautions:    Diagnosis:     Diagnosis Orders   1. Nontraumatic incomplete tear of right rotator cuff          Treatment Diagnosis:    Insurance/Certification information:  [facility]  Referring Physician:  KODAK Reed  Plan of care signed (Y/N):    Visit# / total visits:  -  Pain level: 3/10   Time In:  1040  Time Out:  1105    Subjective:  Pt reports feeling better, states that the pain hasn't been as bad. Reported that the taping really helped with the pinching in the muscle, but that after wearing tape for 3 days as instructed, he had a rash over his arm. Instructed pt to try alternating use of tape, on for daytime hours, off when sleeping, and applying a new piece the next day. Pt and wife previously instructed in proper application of tape. Exercises:  Exercise/Equipment Resistance/Repetitions Other comments     Biceps stretch in doorway 20 sec x 3 reps                                                                                                                                       Other:  kinesiotaping of right biceps completed after US    Home Exercise Program:  N/A    Manual Treatments:  soft tissue massage to biceps tendon and muscle x 10 minutes. Kinesiotape applied after massage. Pt provided with extra piece of tape for home use.     Modalities:  US to right biceps, 50%, 1.0 MHz, 1.2 W/cm², x 8 minutes     Time-in Time-out Total Time   95927  Evaluation Low Complexity      57047  Evaluation Med Complexity      19243  Evaluation High Complexity      05426  Ther Ex      [id number]  Neuro Re-ed        37996  Ther Activities        90844  Manual Therapy       85907  Soft Tissue       Massage  [address]       Treatment/Activity Tolerance:  [x] Patient tolerated treatment well [] Patient limited by fatigue  [] Patient limited by pain  [] Patient limited by other medical complications  [] Other:     Prognosis: [x] Good [] Fair  [] Poor    Patient Requires Follow-up: [x] Yes  [] No    Plan:   [x] Continue per plan of care [] Alter current plan (see comments)  [] Plan of care initiated [] Hold pending MD visit [] Discharge  Plan for Next Session:        Electronically signed by:  Damaso Bethea, [address] [place] [address], [id number]

## 2022-07-26 ENCOUNTER — TREATMENT (OUTPATIENT)
Dept: PHYSICAL THERAPY | Age: 57
End: 2022-07-26
Payer: COMMERCIAL

## 2022-07-26 DIAGNOSIS — M75.111 NONTRAUMATIC INCOMPLETE TEAR OF RIGHT ROTATOR CUFF: Primary | ICD-10-CM

## 2022-07-26 PROCEDURE — 97035 APP MDLTY 1+ULTRASOUND EA 15: CPT | Performed by: PHYSICAL THERAPIST

## 2022-07-26 PROCEDURE — 97124 MASSAGE THERAPY: CPT | Performed by: PHYSICAL THERAPIST

## 2022-07-26 RX ORDER — GLIPIZIDE 5 MG/1
TABLET ORAL
Qty: 270 TABLET | Refills: 0 | Status: SHIPPED
Start: 2022-07-26 | End: 2022-08-03

## 2022-07-26 NOTE — PROGRESS NOTES
[facility]          Phone: [phone] Fax: [phone]    Physical Therapy Daily Treatment Note  Date:  2022    Patient Name:  Omayra Whitfield. :  1965  MRN: [id number]    Evaluating Therapist:  Heavenly Roth, [place] [id number]    Restrictions/Precautions:    Diagnosis:     Diagnosis Orders   1. Nontraumatic incomplete tear of right rotator cuff          Treatment Diagnosis:    Insurance/Certification information:  [facility]  Referring Physician:  KODAK Dos Santos  Plan of care signed (Y/N):    Visit# / total visits:  3/6-  Pain level: 3/10   Time In:  1507  Time Out:  153    Subjective:  Pt reports minimal pain    Exercises:  Exercise/Equipment Resistance/Repetitions Other comments     Biceps stretch in doorway 20 sec x 3 reps                                                                                                                                       Other:  kinesiotaping of right biceps completed after US/STM    Home Exercise Program:  N/A    Manual Treatments:  soft tissue massage to biceps tendon and muscle x 10 minutes. Kinesiotape applied after massage.      Modalities:  US to right biceps, 50%, 1.0 MHz, 1.2 W/cm², x 8 minutes     Time-in Time-out Total Time   00605  Evaluation Low Complexity      63104  Evaluation Med Complexity      55709  Evaluation High Complexity      24298  Ther Ex      [phone]  Neuro Re-ed        04391  Ther Activities        15841  Manual Therapy       14678  Soft Tissue       Massage  1517 1532 15   37505  E-stim       67528  Ultrasound 1507 1517 10         Session 1507 1532 25       Treatment/Activity Tolerance:  [x] Patient tolerated treatment well [] Patient limited by fatigue  [] Patient limited by pain  [] Patient limited by other medical complications  [] Other:     Prognosis: [x] Good [] Fair  [] Poor    Patient Requires Follow-up: [x] Yes  [] No    Plan:   [x] Continue per plan of care [] Alter current plan (see comments)  [] Plan of care initiated [] Hold pending MD visit [] Discharge  Plan for Next Session:        Electronically signed by:  Mecca Michele, [address] [facility], [id number]

## 2022-07-29 ENCOUNTER — TREATMENT (OUTPATIENT)
Dept: PHYSICAL THERAPY | Age: 57
End: 2022-07-29
Payer: COMMERCIAL

## 2022-07-29 DIAGNOSIS — M75.111 NONTRAUMATIC INCOMPLETE TEAR OF RIGHT ROTATOR CUFF: Primary | ICD-10-CM

## 2022-07-29 PROCEDURE — 97110 THERAPEUTIC EXERCISES: CPT | Performed by: PHYSICAL THERAPIST

## 2022-07-29 PROCEDURE — 97035 APP MDLTY 1+ULTRASOUND EA 15: CPT | Performed by: PHYSICAL THERAPIST

## 2022-07-29 NOTE — PROGRESS NOTES
[facility]          Phone: [phone] Fax: [phone]    Physical Therapy Daily Treatment Note  Date:  2022    Patient Name:  Adalgisa Perales. :  1965  MRN: [id number]    Evaluating Therapist:  Renato Akins, [place] 088429    Restrictions/Precautions:    Diagnosis:     Diagnosis Orders   1. Nontraumatic incomplete tear of right rotator cuff            Treatment Diagnosis:    Insurance/Certification information:  [facility]  Referring Physician:  KODAK Huizar  Plan of care signed (Y/N):    Visit# / total visits:  -  Pain level: 3/10   Time In:  1115  Time Out:  1143    Subjective:  Pt denies any pain in right shoulder/arm, no pain/numbness in L UE either    Exercises:  Exercise/Equipment Resistance/Repetitions Other comments     Biceps stretch in doorway 20 sec x 3 reps      UBE 3 min fwd/3 min bwd      Corner stretch 15 sec x 2 reps Pain reported in right biceps, so exercise was ended                                                                                                                        Other:      Home Exercise Program:  N/A    Manual Treatments:  soft tissue massage to biceps tendon and muscle x 5 minutes. No kinesiotaping on this date due to pain well controlled. Instructed pt to apply at home with previously provided tape if needed.       Modalities:  US to right biceps, 50%, 1.0 MHz, 1.2 W/cm², x 8 minutes     Time-in Time-out Total Time   14519  Evaluation Low Complexity      45704  Evaluation Med Complexity      76220  Evaluation High Complexity      88284  Ther Ex 1130 1143 13   40350  Neuro Re-ed        58848  Ther Activities        77194  Manual Therapy       35079  Soft Tissue       Massage  1125 1130 5   91281  E-stim       38257  Ultrasound 1115 1125 10         Session 1115 1143 28       Treatment/Activity Tolerance:  [x] Patient tolerated treatment well [] Patient limited by fatigue  [] Patient limited by pain  [] Patient limited by other medical complications  [] Other:     Prognosis: [x] Good [] Fair  [] Poor    Patient Requires Follow-up: [x] Yes  [] No    Plan:   [x] Continue per plan of care [] Alter current plan (see comments)  [] Plan of care initiated [] Hold pending MD visit [] Discharge  Plan for Next Session:        Electronically signed by:  Mecca Michele, [facility], [id number]

## 2022-08-03 ENCOUNTER — OFFICE VISIT (OUTPATIENT)
Dept: FAMILY MEDICINE CLINIC | Age: 57
End: 2022-08-03
Payer: COMMERCIAL

## 2022-08-03 ENCOUNTER — TREATMENT (OUTPATIENT)
Dept: PHYSICAL THERAPY | Age: 57
End: 2022-08-03
Payer: COMMERCIAL

## 2022-08-03 VITALS
BODY MASS INDEX: 34.44 KG/M2 | WEIGHT: 277 LBS | DIASTOLIC BLOOD PRESSURE: 77 MMHG | HEIGHT: 75 IN | SYSTOLIC BLOOD PRESSURE: 121 MMHG | TEMPERATURE: 97.9 F | HEART RATE: 78 BPM | OXYGEN SATURATION: 96 % | RESPIRATION RATE: 18 BRPM

## 2022-08-03 DIAGNOSIS — Z11.59 NEED FOR HEPATITIS B SCREENING TEST: ICD-10-CM

## 2022-08-03 DIAGNOSIS — Z11.59 ENCOUNTER FOR HEPATITIS C SCREENING TEST FOR LOW RISK PATIENT: ICD-10-CM

## 2022-08-03 DIAGNOSIS — M75.111 NONTRAUMATIC INCOMPLETE TEAR OF RIGHT ROTATOR CUFF: Primary | ICD-10-CM

## 2022-08-03 DIAGNOSIS — E66.9 DIABETES MELLITUS TYPE 2 IN OBESE (HCC): Primary | ICD-10-CM

## 2022-08-03 DIAGNOSIS — Z12.5 SCREENING FOR PROSTATE CANCER: ICD-10-CM

## 2022-08-03 DIAGNOSIS — E78.00 PURE HYPERCHOLESTEROLEMIA: ICD-10-CM

## 2022-08-03 DIAGNOSIS — I10 HYPERTENSION, UNSPECIFIED TYPE: ICD-10-CM

## 2022-08-03 DIAGNOSIS — F17.200 SMOKING: ICD-10-CM

## 2022-08-03 DIAGNOSIS — Z11.4 ENCOUNTER FOR SCREENING FOR HIV: ICD-10-CM

## 2022-08-03 DIAGNOSIS — E11.69 DIABETES MELLITUS TYPE 2 IN OBESE (HCC): Primary | ICD-10-CM

## 2022-08-03 LAB — HBA1C MFR BLD: 8.4 %

## 2022-08-03 PROCEDURE — 97035 APP MDLTY 1+ULTRASOUND EA 15: CPT | Performed by: PHYSICAL THERAPIST

## 2022-08-03 PROCEDURE — 99214 OFFICE O/P EST MOD 30 MIN: CPT | Performed by: STUDENT IN AN ORGANIZED HEALTH CARE EDUCATION/TRAINING PROGRAM

## 2022-08-03 PROCEDURE — 3052F HG A1C>EQUAL 8.0%<EQUAL 9.0%: CPT | Performed by: STUDENT IN AN ORGANIZED HEALTH CARE EDUCATION/TRAINING PROGRAM

## 2022-08-03 PROCEDURE — 97124 MASSAGE THERAPY: CPT | Performed by: PHYSICAL THERAPIST

## 2022-08-03 PROCEDURE — 97110 THERAPEUTIC EXERCISES: CPT | Performed by: PHYSICAL THERAPIST

## 2022-08-03 PROCEDURE — 83036 HEMOGLOBIN GLYCOSYLATED A1C: CPT | Performed by: STUDENT IN AN ORGANIZED HEALTH CARE EDUCATION/TRAINING PROGRAM

## 2022-08-03 RX ORDER — METFORMIN HYDROCHLORIDE 500 MG/1
1000 TABLET, EXTENDED RELEASE ORAL 2 TIMES DAILY
Qty: 360 TABLET | Refills: 3 | Status: SHIPPED | OUTPATIENT
Start: 2022-08-03 | End: 2023-07-29

## 2022-08-03 RX ORDER — GLIPIZIDE 5 MG/1
5 TABLET ORAL
Qty: 180 TABLET | Refills: 3
Start: 2022-08-03

## 2022-08-03 RX ORDER — SIMVASTATIN 10 MG
TABLET ORAL
Qty: 90 TABLET | Refills: 3 | Status: SHIPPED | OUTPATIENT
Start: 2022-08-03

## 2022-08-03 SDOH — ECONOMIC STABILITY: FOOD INSECURITY: WITHIN THE PAST 12 MONTHS, THE FOOD YOU BOUGHT JUST DIDN'T LAST AND YOU DIDN'T HAVE MONEY TO GET MORE.: NEVER TRUE

## 2022-08-03 SDOH — ECONOMIC STABILITY: FOOD INSECURITY: WITHIN THE PAST 12 MONTHS, YOU WORRIED THAT YOUR FOOD WOULD RUN OUT BEFORE YOU GOT MONEY TO BUY MORE.: NEVER TRUE

## 2022-08-03 ASSESSMENT — ENCOUNTER SYMPTOMS
ABDOMINAL PAIN: 0
WHEEZING: 0
CONSTIPATION: 0
SHORTNESS OF BREATH: 0
DIARRHEA: 0
COUGH: 0
BLOOD IN STOOL: 0
NAUSEA: 0

## 2022-08-03 ASSESSMENT — SOCIAL DETERMINANTS OF HEALTH (SDOH): HOW HARD IS IT FOR YOU TO PAY FOR THE VERY BASICS LIKE FOOD, HOUSING, MEDICAL CARE, AND HEATING?: NOT HARD AT ALL

## 2022-08-03 NOTE — PROGRESS NOTES
Roseann Guadalupe. (:  1965) is a 56 y.o. male, New patient , established at the office. here for evaluation of the following:  Establish Care         ASSESSMENT/PLAN      1. Diabetes mellitus type 2 in obese (HCC)  Chronic, not well controlled, A1c of 8.4% today, he does have good health knowledge, he is capable of making the correct changes he just needs to get motivated, did suggest possibly Wellbutrin to help with motivation, weight, and smoking, he would like to try to make the changes he knows he is capable of making and come back in 3 months to see how he is doing, did talk about setting smart goals  -     POCT glycosylated hemoglobin (Hb A1C)  -     metFORMIN (GLUCOPHAGE-XR) 500 MG extended release tablet; Take 2 tablets by mouth in the morning and 2 tablets before bedtime. , Disp-360 tablet, R-3Normal  2. Hypertension, unspecified type  Chronic, well controlled, continue current medications and treatment plan    3. Encounter for screening for HIV  -     HIV Screen; Future  4. Encounter for hepatitis C screening test for low risk patient  -     Hepatitis C Antibody; Future  5. Need for hepatitis B screening test  -     Hepatitis B Core Antibody, IgM; Future  -     Hepatitis B Surface Antibody; Future  -     Hepatitis B Surface Antigen; Future  6. Screening for prostate cancer  -     PSA Screening; Future  7. Pure hypercholesterolemia  -     simvastatin (ZOCOR) 10 MG tablet; TAKE 1 TABLET BY MOUTH ONE TIME A DAY, Disp-90 tablet, R-3Normal  -     Lipid Panel; Future  8. Smoking  Chronic, precontemplative  Return in about 3 months (around 11/3/2022) for Diabetes follow up. Subjective   SUBJECTIVE/OBJECTIVE:  HPI    Patient here to establish care, blood pressure does look much better today. He also has diabetes. He is on farxIGA, metformin, and glipizide, A1c is still elevated at 8.4%. He is taking glipizide 1 tablet in the morning and 1 tablet in the evening he is still currently smoking. He is going to PT for his neck for torn rotator cuff and neck pain. Smokes socially. Diet could be better. He does not exercise much. Declined preventative screening identified as care gaps unless ordered through this visit    PHQ2/PHQ9      No data recorded     Past Medical History:  has a past medical history of Diabetes mellitus ([address] Encounter for screening colonoscopy, Hyperlipemia, and Hypertension. Past Surgical History:  has a past surgical history that includes Tonsillectomy; Upper gastrointestinal endoscopy (09/20/12); sinus surgery; and Colonoscopy (N/A, 7/31/2019). Social History:  reports that he has been smoking cigarettes and cigars. He started smoking about 35 years ago. He has a 3.50 pack-year smoking history. He has never used smokeless tobacco. He reports current alcohol use. He reports that he does not use drugs. Family History: family history includes Alzheimer's Disease in his mother; Anxiety Disorder in his sister; No Known Problems in his father. Allergies: Patient has no known allergies. Medications:   Current Outpatient Medications   Medication Sig Dispense Refill    metFORMIN (GLUCOPHAGE-XR) 500 MG extended release tablet Take 2 tablets by mouth in the morning and 2 tablets before bedtime. 360 tablet 3    simvastatin (ZOCOR) 10 MG tablet TAKE 1 TABLET BY MOUTH ONE TIME A DAY 90 tablet 3    glipiZIDE (GLUCOTROL) 5 MG tablet Take 1 tablet by mouth in the morning and 1 tablet in the evening. Take before meals.  180 tablet 3    losartan-hydroCHLOROthiazide (HYZAAR) 100-25 MG per tablet Take 1 tablet by mouth daily 90 tablet 1    montelukast (SINGULAIR) 10 MG tablet TAKE ONE TABLET BY MOUTH NIGHTLY 90 tablet 1    dapagliflozin (FARXIGA) 10 MG tablet Take 1 tablet by mouth every morning 90 tablet 3    Cholecalciferol (VITAMIN D3) 5000 UNITS TABS Take by mouth daily LD 7/25/19      KRILL OIL PO Take by mouth daily LD 7/25/19      aspirin 81 MG tablet Take 81 mg by mouth daily LD 7/25/19 (takes for healthy living)      Multiple Vitamins-Minerals (CENTRUM PO) Take 1 tablet by mouth daily LD 7/25/19       No current facility-administered medications for this visit. Allergies: Patient has no known allergies. Review of Systems   Constitutional:  Negative for chills, fatigue, fever and unexpected weight change. HENT:  Negative for hearing loss. Eyes:  Negative for visual disturbance. Respiratory:  Negative for cough, shortness of breath and wheezing. Cardiovascular:  Negative for chest pain, palpitations and leg swelling. Gastrointestinal:  Negative for abdominal pain, blood in stool, constipation, diarrhea and nausea. Genitourinary:  Negative for dysuria. Musculoskeletal:  Negative for arthralgias. Neurological:  Negative for weakness, light-headedness, numbness and headaches. Psychiatric/Behavioral:  Negative for dysphoric mood and sleep disturbance. The patient is not nervous/anxious. All other systems reviewed and are negative.        Objective   /77 (Site: Right Upper Arm, Position: Sitting, Cuff Size: Large Adult)   Pulse 78   Temp 97.9 °F (36.6 °C) (Temporal)   Resp 18   Ht 6' 3\" (1.905 m)   Wt 277 lb (125.6 kg)   SpO2 96%   BMI 34.62 kg/m²       Lab Results   Component Value Date    LABA1C 8.4 08/03/2022    LABA1C 8.2 04/14/2022    LABA1C 8.1 12/14/2021     Lab Results   Component Value Date    CHOL 153 06/15/2021    CHOL 143 10/01/2020    CHOL 133 06/17/2020     Lab Results   Component Value Date    TRIG 163 (H) 06/15/2021    TRIG 123 10/01/2020    TRIG 188 (H) 06/17/2020     Lab Results   Component Value Date    HDL 33 06/15/2021    HDL 40 10/01/2020    HDL 32 06/17/2020     Lab Results   Component Value Date    LDLCHOLESTEROL 82 10/29/2015    LDLCALC 87 06/15/2021    LDLCALC 78 10/01/2020    LDLCALC 63 06/17/2020     Lab Results   Component Value Date    LABVLDL 33 06/15/2021    LABVLDL 25 10/01/2020    LABVLDL 38 06/17/2020     No results found for: CHOLHDLRATIO   Creatinine   Date Value Ref Range Status   03/17/2022 0.7 0.7 - 1.2 mg/dL Final   06/15/2021 0.7 0.7 - 1.2 mg/dL Final   06/17/2020 0.7 0.7 - 1.2 mg/dL Final       The 10-year ASCVD risk score (Agatha Pozo et al., 2013) is: 21.2%    Values used to calculate the score:      Age: 56 years      Sex: Male      Is Non- : No      Diabetic: Yes      Tobacco smoker: Yes      Systolic Blood Pressure: 450 mmHg      Is BP treated: Yes      HDL Cholesterol: 33 mg/dL      Total Cholesterol: 153 mg/dL     Physical Exam  Constitutional:       General: He is not in acute distress. Appearance: Normal appearance. HENT:      Head: Normocephalic and atraumatic. Right Ear: External ear normal.      Nose: Nose normal.      Mouth/Throat:      Mouth: Mucous membranes are moist.   Eyes:      Extraocular Movements: Extraocular movements intact. Conjunctiva/sclera: Conjunctivae normal.   Cardiovascular:      Rate and Rhythm: Normal rate and regular rhythm. Heart sounds: No murmur heard. Pulmonary:      Effort: Pulmonary effort is normal.      Breath sounds: Normal breath sounds. No wheezing. Musculoskeletal:         General: Normal range of motion. Cervical back: Normal range of motion and neck supple. Lymphadenopathy:      Cervical: No cervical adenopathy. Neurological:      General: No focal deficit present. Mental Status: He is alert. Psychiatric:         Mood and Affect: Mood normal.         Behavior: Behavior normal.           An electronic signature was used to authenticate this note. --Martín Jean MD       *NOTE: This report was transcribed using voice recognition software. Every effort was made to ensure accuracy; however, inadvertent computerized transcription errors may be present.

## 2022-08-03 NOTE — PROGRESS NOTES
[facility]          Phone: [phone] Fax: [phone]    Physical Therapy Daily Treatment Note  Date:  8/3/2022    Patient Name:  Paul Em :  1965  MRN: [id number]    Evaluating Therapist:  Sendy Lorenzo, [place] [id number]    Restrictions/Precautions:    Diagnosis:     Diagnosis Orders   1. Nontraumatic incomplete tear of right rotator cuff            Treatment Diagnosis:    Insurance/Certification information:  [facility]  Referring Physician:  KODAK Pelaez  Plan of care signed (Y/N):    Visit# / total visits:  -  Pain level: /10   Time In:  1012  Time Out:  1050    Subjective:  Pt reports that he hasn't done much today therefore is not having any significant pain. Exercises:  Exercise/Equipment Resistance/Repetitions Other comments     Biceps stretch in doorway 20 sec x 3 reps      UBE 3 min fwd/3 min bwd      Bicep curls 2# x 20 reps      Front and lateral raises 2# x 10 reps each                                                                                                           Other:      Home Exercise Program:  N/A    Manual Treatments:  soft tissue massage to biceps tendon and muscle x 10 minutes. No kinesiotaping on this date due to pain well controlled. Instructed pt to apply at home with previously provided tape if needed.       Modalities:  US to right biceps, 50%, 1.0 MHz, 1.2 W/cm², x 8 minutes     Time-in Time-out Total Time   86259  Evaluation Low Complexity      44526  Evaluation Med Complexity      40770  Evaluation High Complexity      22840  Ther Ex 1032 1050 18   80993  Neuro Re-ed        39833  Ther Activities        08753  Manual Therapy       72347  Soft Tissue       Massage  1022 1032 10   06315  E-stim       93400  Ultrasound 1012 1022 10         Session 1115 1143 38       Treatment/Activity Tolerance:  [x] Patient tolerated treatment well [] Patient limited by fatigue  [] Patient limited by pain  [] Patient limited by other medical complications  [] Other:     Prognosis: [x] Good [] Fair  [] Poor    Patient Requires Follow-up: [x] Yes  [] No    Plan:   [x] Continue per plan of care [] Alter current plan (see comments)  [] Plan of care initiated [] Hold pending MD visit [] Discharge  Plan for Next Session:        Electronically signed by:  Damaso Bethea [place], [id number]

## 2022-08-11 ENCOUNTER — TREATMENT (OUTPATIENT)
Dept: PHYSICAL THERAPY | Age: 57
End: 2022-08-11
Payer: COMMERCIAL

## 2022-08-11 DIAGNOSIS — M75.111 NONTRAUMATIC INCOMPLETE TEAR OF RIGHT ROTATOR CUFF: Primary | ICD-10-CM

## 2022-08-11 PROCEDURE — 97110 THERAPEUTIC EXERCISES: CPT | Performed by: PHYSICAL THERAPIST

## 2022-08-11 PROCEDURE — 97035 APP MDLTY 1+ULTRASOUND EA 15: CPT | Performed by: PHYSICAL THERAPIST

## 2022-08-11 NOTE — PROGRESS NOTES
[place] [facility]                Phone: [phone] Fax: [phone]    Physical Therapy  Outpatient Discharge Summary     Date:  2022    Patient Name:  Lisa Johnson. :  1965  MRN: [id number]    DIAGNOSIS:     Diagnosis Orders   1. Left cervical radiculopathy          REFERRING PHYSICIAN:  Candelaria Whitmore MD    ATTENDANCE:  Pt has attended 3 of 3 scheduled treatments from 22 to 22. TREATMENTS RECEIVED:  manual therapy, stretching, education in a HEP    INITIAL STATUS:  Pt with intermittent numbness in left forearm and 1st and 2nd digit left hand  Pt with interrupted sleep and difficulty completing work activities due to numbness    FINAL STATUS:  Pt without c/o numbness  Pt able to sleep and complete work activities without onset of numbness  Pt is independent with HEP    GOALS:  3 out of 3 Long Term Goals were obtained. LONG TERM GOALS NOT OBTAINED/REASON:  N/A    PATIENT GOALS:  to get rid of numbness    REASON FOR DISCHARGE:  pt has met goals    PATIENT EDUCATION/INSTRUCTIONS:  pt educated in stretches for HEP    RECOMMENDATIONS:  discharge to HEP        Thank you for the opportunity to work with your patient. If you have questions or comments, please feel free to contact me by phone or fax.       Electronically Signed by: Mushtaq Dennis, [address] [facility], [id number]  2022

## 2022-08-11 NOTE — PROGRESS NOTES
[place] [facility]                Phone: [phone] Fax: [phone]    Physical Therapy  Outpatient Discharge Summary     Date:  2022    Patient Name:  Sammi Thornton. :  1965  MRN: [id number]    DIAGNOSIS:     Diagnosis Orders   1. Nontraumatic incomplete tear of right rotator cuff          REFERRING PHYSICIAN:  KODAK Dover    ATTENDANCE:  Pt has attended 6 of 6 scheduled treatments from 22 to 22. TREATMENTS RECEIVED:  US, soft tissue massage, therapeutic exericises    INITIAL STATUS:  Pain right biceps 5/10  Pt with c/o feeling like muscle is tearing when reaching out and behind    FINAL STATUS:  Pain 0/10  Pt able to reach out and behind with R UE without c/o \"muscle tearing\"  Pt is independent with HEP    GOALS:  3 out of 3 Long Term Goals were obtained. LONG TERM GOALS NOT OBTAINED/REASON:  N/A    PATIENT GOALS:  pain relief    REASON FOR DISCHARGE:  pt has met goals    PATIENT EDUCATION/INSTRUCTIONS:  pt educated in stretching and biceps taping for HEP    RECOMMENDATIONS:  discharge to HEP        Thank you for the opportunity to work with your patient. If you have questions or comments, please feel free to contact me by phone or fax.       Electronically Signed by: Shannon Mcnamara, [id number]  2022

## 2022-08-11 NOTE — PROGRESS NOTES
[facility] Out[facility] Physical Therapy          Phone: [phone] Fax: [phone]    Physical Therapy Daily Treatment Note  Date:  2022    Patient Name:  Michell Damon. :  1965  MRN: [id number]    Evaluating Therapist:  Orville Sherman PT [id number]    Restrictions/Precautions:    Diagnosis:     Diagnosis Orders   1. Nontraumatic incomplete tear of right rotator cuff            Treatment Diagnosis:    Insurance/Certification information:  [facility]  Referring Physician:  KODAK Almonte  Plan of care signed (Y/N):    Visit# / total visits:  -  Pain level: 0/10   Time In:  1340  Time Out:  1412    Subjective:  Pt denies having any pain with any daily activities. Exercises:  Exercise/Equipment Resistance/Repetitions Other comments     Biceps stretch in doorway 20 sec x 3 reps      UBE 5 min fwd/5 min bwd                                                                                                                     Other:      Home Exercise Program:  N/A    Manual Treatments:  soft tissue massage to biceps tendon and muscle x  minutes.        Modalities:  US to right biceps, 50%, 1.0 MHz, 1.2 W/cm², x 8 minutes     Time-in Time-out Total Time   04830  Evaluation Low Complexity      55096  Evaluation Med Complexity      12293  Evaluation High Complexity      37857  Ther Ex 1355 1412 17   59556  Neuro Re-ed        86523  Ther Activities        87039  Manual Therapy       59950  Soft Tissue       Massage  1350 1355 5   45005  E-stim       96459  Ultrasound 1340 1350 10         Session 1340 1412 32       Treatment/Activity Tolerance:  [x] Patient tolerated treatment well [] Patient limited by fatigue  [] Patient limited by pain  [] Patient limited by other medical complications  [] Other:     Prognosis: [x] Good [] Fair  [] Poor    Patient Requires Follow-up: [] Yes  [x] No    Plan:   [] Continue per plan of care [] Alter current plan (see comments)  [] Plan of care initiated [] Hold pending MD visit [x] Discharge  Plan for Next Session:        Electronically signed by:  Shannon Tao, [id number]

## 2022-11-08 ENCOUNTER — OFFICE VISIT (OUTPATIENT)
Dept: FAMILY MEDICINE CLINIC | Age: 57
End: 2022-11-08
Payer: COMMERCIAL

## 2022-11-08 VITALS
TEMPERATURE: 97.5 F | OXYGEN SATURATION: 95 % | RESPIRATION RATE: 16 BRPM | DIASTOLIC BLOOD PRESSURE: 80 MMHG | HEART RATE: 76 BPM | HEIGHT: 75 IN | SYSTOLIC BLOOD PRESSURE: 136 MMHG | WEIGHT: 276 LBS | BODY MASS INDEX: 34.32 KG/M2

## 2022-11-08 DIAGNOSIS — Z11.4 ENCOUNTER FOR SCREENING FOR HIV: ICD-10-CM

## 2022-11-08 DIAGNOSIS — Z11.59 NEED FOR HEPATITIS B SCREENING TEST: ICD-10-CM

## 2022-11-08 DIAGNOSIS — Z11.59 ENCOUNTER FOR HEPATITIS C SCREENING TEST FOR LOW RISK PATIENT: ICD-10-CM

## 2022-11-08 DIAGNOSIS — E66.9 DIABETES MELLITUS TYPE 2 IN OBESE (HCC): Primary | ICD-10-CM

## 2022-11-08 DIAGNOSIS — Z12.5 SCREENING FOR PROSTATE CANCER: ICD-10-CM

## 2022-11-08 DIAGNOSIS — E11.69 DIABETES MELLITUS TYPE 2 IN OBESE (HCC): Primary | ICD-10-CM

## 2022-11-08 DIAGNOSIS — E78.00 PURE HYPERCHOLESTEROLEMIA: ICD-10-CM

## 2022-11-08 LAB
CHOLESTEROL, TOTAL: 153 MG/DL (ref 0–199)
HBA1C MFR BLD: 8.4 %
HDLC SERPL-MCNC: 36 MG/DL
LDL CHOLESTEROL CALCULATED: 87 MG/DL (ref 0–99)
PROSTATE SPECIFIC ANTIGEN: 0.41 NG/ML (ref 0–4)
TRIGL SERPL-MCNC: 149 MG/DL (ref 0–149)
VLDLC SERPL CALC-MCNC: 30 MG/DL

## 2022-11-08 PROCEDURE — 3074F SYST BP LT 130 MM HG: CPT | Performed by: STUDENT IN AN ORGANIZED HEALTH CARE EDUCATION/TRAINING PROGRAM

## 2022-11-08 PROCEDURE — 99214 OFFICE O/P EST MOD 30 MIN: CPT | Performed by: STUDENT IN AN ORGANIZED HEALTH CARE EDUCATION/TRAINING PROGRAM

## 2022-11-08 PROCEDURE — 3078F DIAST BP <80 MM HG: CPT | Performed by: STUDENT IN AN ORGANIZED HEALTH CARE EDUCATION/TRAINING PROGRAM

## 2022-11-08 PROCEDURE — 3052F HG A1C>EQUAL 8.0%<EQUAL 9.0%: CPT | Performed by: STUDENT IN AN ORGANIZED HEALTH CARE EDUCATION/TRAINING PROGRAM

## 2022-11-08 PROCEDURE — 83036 HEMOGLOBIN GLYCOSYLATED A1C: CPT | Performed by: STUDENT IN AN ORGANIZED HEALTH CARE EDUCATION/TRAINING PROGRAM

## 2022-11-08 ASSESSMENT — ENCOUNTER SYMPTOMS
SHORTNESS OF BREATH: 0
ABDOMINAL PAIN: 0
TROUBLE SWALLOWING: 0

## 2022-11-08 NOTE — PROGRESS NOTES
Azael Blackmon (:  1965) is a 56 y.o. male, established patient follow up , here for evaluation of the following:  Diabetes         ASSESSMENT/PLAN      1. Diabetes mellitus type 2 in obese (HCC)  Chronic, not well controlled, patient is aware of nutritional and lifestyle modifications he needs to make make to bring it down, I do think if he falls his to goals especially through this holiday season he will be able to bring his A1c down significantly, if not at that point we can discuss further medications    He declines all vaccines today  -     POCT glycosylated hemoglobin (Hb A1C)    Return in about 3 months (around 2023) for Diabetes follow up. Subjective   SUBJECTIVE/OBJECTIVE:  HPI  Patient is here for follow-up. His A1c was 8.4 last visit, he was supposed make lifestyle changes to bring it down, he has not been successful at that, it is 8.4% again today, we did thoroughly discuss lifestyle changes he would like to make and came up with these 2 goals for himself:  40-50% plate is green one serving every day  Exercise 3 days a week during TV time      Declined preventative screening identified as care gaps unless ordered through this visit    PHQ2/PHQ9      No data recorded     Past Medical History:  has a past medical history of Diabetes mellitus ([facility] [address] Encounter for screening colonoscopy, Hyperlipemia, and Hypertension. Past Surgical History:  has a past surgical history that includes Tonsillectomy; Upper gastrointestinal endoscopy (12); sinus surgery; and Colonoscopy (N/A, 2019). Social History:  reports that he has been smoking cigarettes and cigars. He started smoking about 35 years ago. He has a 3.50 pack-year smoking history. He has never used smokeless tobacco. He reports current alcohol use. He reports that he does not use drugs. Family History: family history includes Alzheimer's Disease in his mother; Anxiety Disorder in his sister;  No Known Problems in his father. Allergies: Patient has no known allergies. Medications:   Current Outpatient Medications   Medication Sig Dispense Refill    metFORMIN (GLUCOPHAGE-XR) 500 MG extended release tablet Take 2 tablets by mouth in the morning and 2 tablets before bedtime. 360 tablet 3    simvastatin (ZOCOR) 10 MG tablet TAKE 1 TABLET BY MOUTH ONE TIME A DAY 90 tablet 3    glipiZIDE (GLUCOTROL) 5 MG tablet Take 1 tablet by mouth in the morning and 1 tablet in the evening. Take before meals. 180 tablet 3    losartan-hydroCHLOROthiazide (HYZAAR) 100-25 MG per tablet Take 1 tablet by mouth daily 90 tablet 1    montelukast (SINGULAIR) 10 MG tablet TAKE ONE TABLET BY MOUTH NIGHTLY 90 tablet 1    dapagliflozin (FARXIGA) 10 MG tablet Take 1 tablet by mouth every morning 90 tablet 3    Cholecalciferol (VITAMIN D3) 5000 UNITS TABS Take by mouth daily LD 7/25/19      KRILL OIL PO Take by mouth daily LD 7/25/19      aspirin 81 MG tablet Take 81 mg by mouth daily LD 7/25/19 (takes for healthy living)      Multiple Vitamins-Minerals (CENTRUM PO) Take 1 tablet by mouth daily LD 7/25/19       No current facility-administered medications for this visit. Allergies: Patient has no known allergies. Review of Systems   Constitutional:  Negative for activity change, appetite change, fatigue, fever and unexpected weight change. HENT:  Negative for trouble swallowing. Eyes:  Negative for visual disturbance. Respiratory:  Negative for shortness of breath. Cardiovascular:  Negative for chest pain. Gastrointestinal:  Negative for abdominal pain. Musculoskeletal:  Negative for arthralgias. Neurological:  Negative for weakness, light-headedness and headaches. Psychiatric/Behavioral:  Negative for confusion and sleep disturbance. All other systems reviewed and are negative.        Objective   /80   Pulse 76   Temp 97.5 °F (36.4 °C)   Resp 16   Ht 6' 3\" (1.905 m)   Wt 276 lb (125.2 kg)   SpO2 95%   BMI 34.50 kg/m² Lab Results   Component Value Date    LABA1C 8.4 11/08/2022    LABA1C 8.4 08/03/2022    LABA1C 8.2 04/14/2022     Lab Results   Component Value Date    CHOL 153 06/15/2021    CHOL 143 10/01/2020    CHOL 133 06/17/2020     Lab Results   Component Value Date    TRIG 163 (H) 06/15/2021    TRIG 123 10/01/2020    TRIG 188 (H) 06/17/2020     Lab Results   Component Value Date    HDL 33 06/15/2021    HDL 40 10/01/2020    HDL 32 06/17/2020     Lab Results   Component Value Date    LDLCHOLESTEROL 82 10/29/2015    LDLCALC 87 06/15/2021    LDLCALC 78 10/01/2020    LDLCALC 63 06/17/2020     Lab Results   Component Value Date    LABVLDL 33 06/15/2021    LABVLDL 25 10/01/2020    LABVLDL 38 06/17/2020     No results found for: CHOLHDLRATIO   Creatinine   Date Value Ref Range Status   03/17/2022 0.7 0.7 - 1.2 mg/dL Final   06/15/2021 0.7 0.7 - 1.2 mg/dL Final   06/17/2020 0.7 0.7 - 1.2 mg/dL Final       The 10-year ASCVD risk score (Evin DK, et al., 2019) is: 25.5%    Values used to calculate the score:      Age: 56 years      Sex: Male      Is Non- : No      Diabetic: Yes      Tobacco smoker: Yes      Systolic Blood Pressure: 398 mmHg      Is BP treated: Yes      HDL Cholesterol: 33 mg/dL      Total Cholesterol: 153 mg/dL     Physical Exam  Constitutional:       General: He is not in acute distress. Appearance: Normal appearance. HENT:      Head: Normocephalic and atraumatic. Right Ear: External ear normal.      Left Ear: External ear normal.      Nose: Nose normal.      Mouth/Throat:      Mouth: Mucous membranes are moist.   Eyes:      Extraocular Movements: Extraocular movements intact. Conjunctiva/sclera: Conjunctivae normal.   Cardiovascular:      Rate and Rhythm: Normal rate and regular rhythm. Heart sounds: No murmur heard. Pulmonary:      Effort: Pulmonary effort is normal.      Breath sounds: Normal breath sounds. No wheezing.    Musculoskeletal:         General: Normal range of motion. Neurological:      General: No focal deficit present. Mental Status: He is alert. Psychiatric:         Mood and Affect: Mood normal.         Behavior: Behavior normal.           An electronic signature was used to authenticate this note. --Sonu Funez MD       *NOTE: This report was transcribed using voice recognition software. Every effort was made to ensure accuracy; however, inadvertent computerized transcription errors may be present.

## 2022-11-08 NOTE — PATIENT INSTRUCTIONS
40-50% plate is green or other colorful veggie at lunch and dinner and only 1 plate of appropriate serving a day.      Exercise 3 days a week during TV time

## 2022-11-16 LAB
HBV SURFACE AB TITR SER: NORMAL {TITER}
HEPATITIS B CORE IGM ANTIBODY: NORMAL
HEPATITIS B SURFACE ANTIGEN INTERPRETATION: NORMAL
HEPATITIS C ANTIBODY INTERPRETATION: NORMAL
HIV-1 AND HIV-2 ANTIBODIES: NORMAL

## 2022-12-28 ENCOUNTER — TELEPHONE (OUTPATIENT)
Dept: FAMILY MEDICINE CLINIC | Age: 57
End: 2022-12-28

## 2022-12-28 RX ORDER — MONTELUKAST SODIUM 10 MG/1
10 TABLET ORAL NIGHTLY
Qty: 90 TABLET | Refills: 3 | Status: SHIPPED | OUTPATIENT
Start: 2022-12-28

## 2022-12-29 ENCOUNTER — TELEPHONE (OUTPATIENT)
Dept: FAMILY MEDICINE CLINIC | Age: 57
End: 2022-12-29

## 2023-01-26 ENCOUNTER — TELEPHONE (OUTPATIENT)
Dept: FAMILY MEDICINE CLINIC | Age: 58
End: 2023-01-26

## 2023-01-26 DIAGNOSIS — Z91.89 AT RISK FOR FOOT PROBLEM: Primary | ICD-10-CM

## 2023-01-31 RX ORDER — LOSARTAN POTASSIUM AND HYDROCHLOROTHIAZIDE 25; 100 MG/1; MG/1
1 TABLET ORAL DAILY
Qty: 90 TABLET | Refills: 3 | Status: SHIPPED | OUTPATIENT
Start: 2023-01-31

## 2023-02-27 ENCOUNTER — OFFICE VISIT (OUTPATIENT)
Dept: PODIATRY | Age: 58
End: 2023-02-27
Payer: COMMERCIAL

## 2023-02-27 VITALS — HEIGHT: 75 IN | BODY MASS INDEX: 34.32 KG/M2 | WEIGHT: 276 LBS

## 2023-02-27 DIAGNOSIS — E11.9 TYPE 2 DIABETES MELLITUS WITHOUT COMPLICATION, UNSPECIFIED WHETHER LONG TERM INSULIN USE (HCC): Primary | ICD-10-CM

## 2023-02-27 PROCEDURE — 99203 OFFICE O/P NEW LOW 30 MIN: CPT | Performed by: PODIATRIST

## 2023-02-27 NOTE — PROGRESS NOTES
Eliel Larry. is here today for a diabetic foot exam. his PCP is Odilia Oshea MD last OV was 2023.     23  Eliel Larry. : 1965 Sex: male  Age: 66 y.o. Patient was referred by Odilia Oshea MD    CC:    Diabetic foot ankle exam    HPI:   This pleasant 61-year-old male diabetic patient referred me today for foot and ankle exam.  History of metformin. Recent hemoglobin A1c has been over 8. Denies any numbness or tingling of his feet. Denies any history of diabetic wounds or any open sores or ulcerations of his foot or ankle. Denies any calf pain. No additional pedal complaints today. ROS:  Const: Denies constitutional symptoms  Musculo: Denies symptoms other than stated above  Skin: Denies symptoms other than stated above       Current Outpatient Medications:     dapagliflozin (FARXIGA) 10 MG tablet, Take 1 tablet by mouth every morning, Disp: 90 tablet, Rfl: 3    losartan-hydroCHLOROthiazide (HYZAAR) 100-25 MG per tablet, Take 1 tablet by mouth daily, Disp: 90 tablet, Rfl: 3    montelukast (SINGULAIR) 10 MG tablet, Take 1 tablet by mouth nightly, Disp: 90 tablet, Rfl: 3    metFORMIN (GLUCOPHAGE-XR) 500 MG extended release tablet, Take 2 tablets by mouth in the morning and 2 tablets before bedtime. , Disp: 360 tablet, Rfl: 3    simvastatin (ZOCOR) 10 MG tablet, TAKE 1 TABLET BY MOUTH ONE TIME A DAY, Disp: 90 tablet, Rfl: 3    glipiZIDE (GLUCOTROL) 5 MG tablet, Take 1 tablet by mouth in the morning and 1 tablet in the evening. Take before meals. , Disp: 180 tablet, Rfl: 3    Cholecalciferol (VITAMIN D3) 5000 UNITS TABS, Take by mouth daily LD 19, Disp: , Rfl:     KRILL OIL PO, Take by mouth daily LD 19, Disp: , Rfl:     aspirin 81 MG tablet, Take 81 mg by mouth daily LD 19 (takes for healthy living), Disp: , Rfl:     Multiple Vitamins-Minerals (CENTRUM PO), Take 1 tablet by mouth daily LD 19, Disp: , Rfl:   No Known Allergies    Past Medical History:   Diagnosis Date    Diabetes mellitus ([facility]     Encounter for screening colonoscopy     Hyperlipemia     Hypertension            Vitals:    02/27/23 0901   Weight: 276 lb (125.2 kg)   Height: 6' 3\" (1.905 m)       Work History/Social History: Foot and ankle history:     Focused Lower Extremity Physical Exam:    Neurovascular examination:    Dorsalis Pedis palpable bilateral.  Posterior tibialis palpable bilateral.    Capillary Refill Time:  Immediate return  Hair growth:  Symmetrical and bilateral   Skin:  Not atrophic  Edema: No edema bilateral feet or ankles. Neurologic:  Light touch intact bilateral.      Musculoskeletal/ Orthopedic examination:    Equinis: Absent bilateral  Dorsiflexion, plantarflexion, inversion, eversion bilateral 5 out of 5 muscle strength  Wiggling toes  Negative Homans. Negative Shirley. No pain foot or ankle. Dermatology examination:    No open skin lesions or abrasions bilateral lower extremity. Assessment and Plan:  Minor Miller was seen today for diabetes. Diagnoses and all orders for this visit:    Type 2 diabetes mellitus without complication, unspecified whether long term insulin use ([facility]    Other orders  -     Diabetic Foot Exam      New referral diabetic foot ankle exam  Recent hemoglobin A1c from 11/8/2022.  8.4. We did discuss over-the-counter close toed low-dose compression stockings knee-high to be worn during the day remove at night any calf pain to the emergency room. Avoid barefoot  Lotion daily. Importance of continued follow-up with primary care team regarding glucose control. Appreciate Dr. Chang Vincent input from diabetic standpoint going forward. From diabetic foot ankle standpoint I will follow-up in office 1 year. Any new foot or ankle issues I be happy to see him back sooner. Return in about 1 year (around 2/27/2024). Seen By:  Isabela Allsion DPM      Document was created using voice recognition software.   Note was reviewed, however may contain grammatical errors.

## 2023-03-01 ENCOUNTER — OFFICE VISIT (OUTPATIENT)
Dept: FAMILY MEDICINE CLINIC | Age: 58
End: 2023-03-01

## 2023-03-01 VITALS
OXYGEN SATURATION: 94 % | BODY MASS INDEX: 34.62 KG/M2 | TEMPERATURE: 97 F | WEIGHT: 278.4 LBS | SYSTOLIC BLOOD PRESSURE: 150 MMHG | DIASTOLIC BLOOD PRESSURE: 98 MMHG | HEIGHT: 75 IN | HEART RATE: 81 BPM | RESPIRATION RATE: 20 BRPM

## 2023-03-01 DIAGNOSIS — E11.69 DIABETES MELLITUS TYPE 2 IN OBESE (HCC): ICD-10-CM

## 2023-03-01 DIAGNOSIS — I10 HYPERTENSION, UNSPECIFIED TYPE: ICD-10-CM

## 2023-03-01 DIAGNOSIS — E66.9 DIABETES MELLITUS TYPE 2 IN OBESE (HCC): ICD-10-CM

## 2023-03-01 DIAGNOSIS — R53.81 MALAISE: Primary | ICD-10-CM

## 2023-03-01 DIAGNOSIS — R68.83 CHILLS: ICD-10-CM

## 2023-03-01 LAB — HBA1C MFR BLD: 8.8 %

## 2023-03-01 SDOH — ECONOMIC STABILITY: TRANSPORTATION INSECURITY
IN THE PAST 12 MONTHS, HAS LACK OF TRANSPORTATION KEPT YOU FROM MEETINGS, WORK, OR FROM GETTING THINGS NEEDED FOR DAILY LIVING?: NO

## 2023-03-01 SDOH — HEALTH STABILITY: PHYSICAL HEALTH: ON AVERAGE, HOW MANY DAYS PER WEEK DO YOU ENGAGE IN MODERATE TO STRENUOUS EXERCISE (LIKE A BRISK WALK)?: 0 DAYS

## 2023-03-01 SDOH — HEALTH STABILITY: PHYSICAL HEALTH: ON AVERAGE, HOW MANY MINUTES DO YOU ENGAGE IN EXERCISE AT THIS LEVEL?: 0 MIN

## 2023-03-01 SDOH — ECONOMIC STABILITY: TRANSPORTATION INSECURITY
IN THE PAST 12 MONTHS, HAS THE LACK OF TRANSPORTATION KEPT YOU FROM MEDICAL APPOINTMENTS OR FROM GETTING MEDICATIONS?: NO

## 2023-03-01 SDOH — ECONOMIC STABILITY: HOUSING INSECURITY
IN THE LAST 12 MONTHS, WAS THERE A TIME WHEN YOU DID NOT HAVE A STEADY PLACE TO SLEEP OR SLEPT IN A SHELTER (INCLUDING NOW)?: NO

## 2023-03-01 SDOH — ECONOMIC STABILITY: HOUSING INSECURITY: IN THE LAST 12 MONTHS, HOW MANY PLACES HAVE YOU LIVED?: 0

## 2023-03-01 SDOH — ECONOMIC STABILITY: INCOME INSECURITY: IN THE LAST 12 MONTHS, WAS THERE A TIME WHEN YOU WERE NOT ABLE TO PAY THE MORTGAGE OR RENT ON TIME?: NO

## 2023-03-01 ASSESSMENT — PATIENT HEALTH QUESTIONNAIRE - PHQ9
SUM OF ALL RESPONSES TO PHQ9 QUESTIONS 1 & 2: 0
SUM OF ALL RESPONSES TO PHQ QUESTIONS 1-9: 0
2. FEELING DOWN, DEPRESSED OR HOPELESS: 0
1. LITTLE INTEREST OR PLEASURE IN DOING THINGS: 0
SUM OF ALL RESPONSES TO PHQ QUESTIONS 1-9: 0

## 2023-03-01 ASSESSMENT — SOCIAL DETERMINANTS OF HEALTH (SDOH)
HOW OFTEN DO YOU GET TOGETHER WITH FRIENDS OR RELATIVES?: MORE THAN THREE TIMES A WEEK
WITHIN THE LAST YEAR, HAVE YOU BEEN HUMILIATED OR EMOTIONALLY ABUSED IN OTHER WAYS BY YOUR PARTNER OR EX-PARTNER?: NO
WITHIN THE LAST YEAR, HAVE TO BEEN RAPED OR FORCED TO HAVE ANY KIND OF SEXUAL ACTIVITY BY YOUR PARTNER OR EX-PARTNER?: NO
IN A TYPICAL WEEK, HOW MANY TIMES DO YOU TALK ON THE PHONE WITH FAMILY, FRIENDS, OR NEIGHBORS?: MORE THAN THREE TIMES A WEEK
HOW OFTEN DO YOU ATTENT MEETINGS OF THE CLUB OR ORGANIZATION YOU BELONG TO?: NEVER
WITHIN THE LAST YEAR, HAVE YOU BEEN KICKED, HIT, SLAPPED, OR OTHERWISE PHYSICALLY HURT BY YOUR PARTNER OR EX-PARTNER?: NO
HOW OFTEN DO YOU ATTEND CHURCH OR RELIGIOUS SERVICES?: NEVER
WITHIN THE LAST YEAR, HAVE YOU BEEN AFRAID OF YOUR PARTNER OR EX-PARTNER?: NO
DO YOU BELONG TO ANY CLUBS OR ORGANIZATIONS SUCH AS CHURCH GROUPS UNIONS, FRATERNAL OR ATHLETIC GROUPS, OR SCHOOL GROUPS?: NO

## 2023-03-01 ASSESSMENT — LIFESTYLE VARIABLES
HOW OFTEN DO YOU HAVE A DRINK CONTAINING ALCOHOL: NEVER
HOW MANY STANDARD DRINKS CONTAINING ALCOHOL DO YOU HAVE ON A TYPICAL DAY: PATIENT DOES NOT DRINK

## 2023-03-01 ASSESSMENT — ENCOUNTER SYMPTOMS
TROUBLE SWALLOWING: 0
ABDOMINAL PAIN: 0
COUGH: 1
SHORTNESS OF BREATH: 0

## 2023-03-01 NOTE — PROGRESS NOTES
Mela Pierce. (:  1965) is a 66 y.o. male, established patient follow up , here for evaluation of the following:  Diabetes         ASSESSMENT/PLAN      1. Malaise  Acute, he did just get back from a cruise, will check for COVID and flu, he was negative for both, headache and chills have resolved, recommended he continue to rest, stay hydrated, no signs of bacterial infection at this time, if it does not improve he will let us know  -     POCT COVID-19, Antigen  2. Diabetes mellitus type 2 in obese (HCC)  Chronic, not well controlled, now up to 8.8, did thoroughly discussed that if he cannot change his dietary habits and increase his exercise he will have to go on additional medications including possibly long-acting insulin, he does not want to do this, did provide him with the [address] cardiometabolic diet plan, did discuss that this can put diabetes into remission, he notes he has had education in the past he just has problems putting it into practice, he notes he would like 3 more months to try and fix this before starting additional medications  -     POCT glycosylated hemoglobin (Hb A1C)  3. Hypertension, unspecified type  Chronic, not well controlled, he notes he did not take his medications today, he will go home, take his medications, keep track of his blood pressure, he will have his wife bring a list into work, we will have to add additional medications if blood pressure remains greater than 135 over greater than 85, changes in diet and exercise would likely help this as well  4. Chills  -     POCT COVID-19, Antigen    Return in about 3 months (around 2023) for Diabetes follow up. Subjective   SUBJECTIVE/OBJECTIVE:  HPI    He is here for follow up. He just got back from a cruise. He had chills on Monday, no fever. He then started with head ache n the right side and neck pain, could not turn head. He took tylenol and laid in bed.  He spent a lot fo time on the cough. No congestion, sore throat, no cough. Come green mucus. No diarrhea, still eating and staying hydrated. Head ache is gone. No vision changes. He feels run down head ache and chills gone. He does not check sugars. He has not taken meds yet today. Declined preventative screening identified as care gaps unless ordered through this visit    PHQ2/PHQ9  PHQ-2 Score: 0  PHQ-2 Over the past 2 weeks, how often have you been bothered by any of the following problems? Little interest or pleasure in doing things: Not at all  Feeling down, depressed, or hopeless: Not at all  PHQ-2 Score: 0   PHQ-9 Total Score: 0 (3/1/2023  9:11 AM)       Past Medical History:  has a past medical history of Diabetes mellitus ([facility] [address] Encounter for screening colonoscopy, Hyperlipemia, and Hypertension. Past Surgical History:  has a past surgical history that includes Tonsillectomy; Upper gastrointestinal endoscopy (09/20/12); sinus surgery; and Colonoscopy (N/A, 7/31/2019). Social History:  reports that he has been smoking cigarettes and cigars. He started smoking about 36 years ago. He has a 3.50 pack-year smoking history. He has never used smokeless tobacco. He reports current alcohol use. He reports that he does not use drugs. Family History: family history includes Alzheimer's Disease in his mother; Anxiety Disorder in his sister; No Known Problems in his father. Allergies: Patient has no known allergies. Medications:   Current Outpatient Medications   Medication Sig Dispense Refill    dapagliflozin (FARXIGA) 10 MG tablet Take 1 tablet by mouth every morning 90 tablet 3    losartan-hydroCHLOROthiazide (HYZAAR) 100-25 MG per tablet Take 1 tablet by mouth daily 90 tablet 3    montelukast (SINGULAIR) 10 MG tablet Take 1 tablet by mouth nightly 90 tablet 3    metFORMIN (GLUCOPHAGE-XR) 500 MG extended release tablet Take 2 tablets by mouth in the morning and 2 tablets before bedtime.  360 tablet 3 simvastatin (ZOCOR) 10 MG tablet TAKE 1 TABLET BY MOUTH ONE TIME A DAY 90 tablet 3    glipiZIDE (GLUCOTROL) 5 MG tablet Take 1 tablet by mouth in the morning and 1 tablet in the evening. Take before meals. 180 tablet 3    Cholecalciferol (VITAMIN D3) 5000 UNITS TABS Take by mouth daily  7/25/19      KRILL OIL PO Take by mouth daily  7/25/19      aspirin 81 MG tablet Take 81 mg by mouth daily LD 7/25/19 (takes for healthy living)      Multiple Vitamins-Minerals (CENTRUM PO) Take 1 tablet by mouth daily  7/25/19       No current facility-administered medications for this visit. Allergies: Patient has no known allergies. Review of Systems   Constitutional:  Positive for fatigue. Negative for activity change, appetite change, fever and unexpected weight change. HENT:  Negative for trouble swallowing. Eyes:  Negative for visual disturbance. Respiratory:  Positive for cough. Negative for shortness of breath. Cardiovascular:  Negative for chest pain. Gastrointestinal:  Negative for abdominal pain. Musculoskeletal:  Negative for arthralgias. Neurological:  Negative for weakness, light-headedness and headaches. Psychiatric/Behavioral:  Negative for confusion and sleep disturbance. All other systems reviewed and are negative.        Objective   BP (!) 150/98   Pulse 81   Temp 97 °F (36.1 °C) (Temporal)   Resp 20   Ht 6' 3\" (1.905 m)   Wt 278 lb 6.4 oz (126.3 kg)   SpO2 94%   BMI 34.80 kg/m²       Lab Results   Component Value Date    LABA1C 8.8 03/01/2023    LABA1C 8.4 11/08/2022    LABA1C 8.4 08/03/2022     Lab Results   Component Value Date    CHOL 153 11/08/2022    CHOL 153 06/15/2021    CHOL 143 10/01/2020     Lab Results   Component Value Date    TRIG 149 11/08/2022    TRIG 163 (H) 06/15/2021    TRIG 123 10/01/2020     Lab Results   Component Value Date    HDL 36 11/08/2022    HDL 33 06/15/2021    HDL 40 10/01/2020     Lab Results   Component Value Date    LDLCHOLESTEROL 80 10/29/2015    LDLCALC 87 11/08/2022    LDLCALC 87 06/15/2021    LDLCALC 78 10/01/2020     Lab Results   Component Value Date    LABVLDL 30 11/08/2022    LABVLDL 33 06/15/2021    LABVLDL 25 10/01/2020     No results found for: CHOLHDLRATIO   Creatinine   Date Value Ref Range Status   03/17/2022 0.7 0.7 - 1.2 mg/dL Final   06/15/2021 0.7 0.7 - 1.2 mg/dL Final   06/17/2020 0.7 0.7 - 1.2 mg/dL Final       The 10-year ASCVD risk score (Evin CARR, et al., 2019) is: 29.2%    Values used to calculate the score:      Age: 66 years      Sex: Male      Is Non- : No      Diabetic: Yes      Tobacco smoker: Yes      Systolic Blood Pressure: 496 mmHg      Is BP treated: Yes      HDL Cholesterol: 36 mg/dL      Total Cholesterol: 153 mg/dL     Physical Exam  Constitutional:       Appearance: Normal appearance. HENT:      Head: Normocephalic and atraumatic. Right Ear: Tympanic membrane, ear canal and external ear normal. No middle ear effusion. Left Ear: Tympanic membrane, ear canal and external ear normal.  No middle ear effusion. Nose: No congestion or rhinorrhea. Right Turbinates: Not enlarged or swollen. Left Turbinates: Not enlarged or swollen. Mouth/Throat:      Mouth: Mucous membranes are moist.      Pharynx: No oropharyngeal exudate or posterior oropharyngeal erythema. Eyes:      Extraocular Movements: Extraocular movements intact. Conjunctiva/sclera: Conjunctivae normal.   Cardiovascular:      Rate and Rhythm: Normal rate and regular rhythm. Pulmonary:      Effort: Pulmonary effort is normal.      Breath sounds: Normal breath sounds. Musculoskeletal:      Cervical back: Normal range of motion and neck supple. Lymphadenopathy:      Cervical: No cervical adenopathy. Neurological:      Mental Status: He is alert.    Psychiatric:         Mood and Affect: Mood normal.         Behavior: Behavior normal.           An electronic signature was used to authenticate this note. --Benita Boxer, MD       *NOTE: This report was transcribed using voice recognition software. Every effort was made to ensure accuracy; however, inadvertent computerized transcription errors may be present.

## 2023-03-10 LAB — DIABETIC RETINOPATHY: NEGATIVE

## 2023-06-06 ENCOUNTER — OFFICE VISIT (OUTPATIENT)
Dept: FAMILY MEDICINE CLINIC | Age: 58
End: 2023-06-06
Payer: COMMERCIAL

## 2023-06-06 VITALS
BODY MASS INDEX: 33.94 KG/M2 | SYSTOLIC BLOOD PRESSURE: 121 MMHG | OXYGEN SATURATION: 96 % | WEIGHT: 273 LBS | TEMPERATURE: 96.6 F | RESPIRATION RATE: 20 BRPM | DIASTOLIC BLOOD PRESSURE: 85 MMHG | HEART RATE: 73 BPM | HEIGHT: 75 IN

## 2023-06-06 DIAGNOSIS — Z23 NEED FOR TDAP VACCINATION: ICD-10-CM

## 2023-06-06 DIAGNOSIS — I10 PRIMARY HYPERTENSION: ICD-10-CM

## 2023-06-06 DIAGNOSIS — Z23 NEED FOR PROPHYLACTIC VACCINATION AGAINST STREPTOCOCCUS PNEUMONIAE (PNEUMOCOCCUS): ICD-10-CM

## 2023-06-06 DIAGNOSIS — E78.00 PURE HYPERCHOLESTEROLEMIA: ICD-10-CM

## 2023-06-06 DIAGNOSIS — E55.9 VITAMIN D DEFICIENCY: ICD-10-CM

## 2023-06-06 DIAGNOSIS — R63.5 WEIGHT GAIN: ICD-10-CM

## 2023-06-06 DIAGNOSIS — E11.65 TYPE 2 DIABETES MELLITUS WITH HYPERGLYCEMIA, WITHOUT LONG-TERM CURRENT USE OF INSULIN (HCC): Primary | ICD-10-CM

## 2023-06-06 LAB — HBA1C MFR BLD: 8.5 %

## 2023-06-06 PROCEDURE — 90677 PCV20 VACCINE IM: CPT | Performed by: STUDENT IN AN ORGANIZED HEALTH CARE EDUCATION/TRAINING PROGRAM

## 2023-06-06 PROCEDURE — 90471 IMMUNIZATION ADMIN: CPT | Performed by: STUDENT IN AN ORGANIZED HEALTH CARE EDUCATION/TRAINING PROGRAM

## 2023-06-06 PROCEDURE — 90472 IMMUNIZATION ADMIN EACH ADD: CPT | Performed by: STUDENT IN AN ORGANIZED HEALTH CARE EDUCATION/TRAINING PROGRAM

## 2023-06-06 PROCEDURE — 3052F HG A1C>EQUAL 8.0%<EQUAL 9.0%: CPT | Performed by: STUDENT IN AN ORGANIZED HEALTH CARE EDUCATION/TRAINING PROGRAM

## 2023-06-06 PROCEDURE — 90715 TDAP VACCINE 7 YRS/> IM: CPT | Performed by: STUDENT IN AN ORGANIZED HEALTH CARE EDUCATION/TRAINING PROGRAM

## 2023-06-06 PROCEDURE — 3079F DIAST BP 80-89 MM HG: CPT | Performed by: STUDENT IN AN ORGANIZED HEALTH CARE EDUCATION/TRAINING PROGRAM

## 2023-06-06 PROCEDURE — 3074F SYST BP LT 130 MM HG: CPT | Performed by: STUDENT IN AN ORGANIZED HEALTH CARE EDUCATION/TRAINING PROGRAM

## 2023-06-06 PROCEDURE — 83036 HEMOGLOBIN GLYCOSYLATED A1C: CPT | Performed by: STUDENT IN AN ORGANIZED HEALTH CARE EDUCATION/TRAINING PROGRAM

## 2023-06-06 PROCEDURE — 99214 OFFICE O/P EST MOD 30 MIN: CPT | Performed by: STUDENT IN AN ORGANIZED HEALTH CARE EDUCATION/TRAINING PROGRAM

## 2023-06-06 ASSESSMENT — ENCOUNTER SYMPTOMS
TROUBLE SWALLOWING: 0
ABDOMINAL PAIN: 0
SHORTNESS OF BREATH: 0

## 2023-06-06 NOTE — PROGRESS NOTES
Last visit BP was up. He did have A1c was 8.8 not 8.5. He did not change his diet very much. He eats a lot of oatmeal. He notes he did make good changes for a month. He has been eating out a lot. He eats poor choices when he is out socially. He does take metformin and glipizide an farxiga without side effects       Declined preventative screening identified as care gaps unless ordered through this visit    PHQ2/PHQ9      No data recorded     Past Medical History:  has a past medical history of Diabetes mellitus ([address] Encounter for screening colonoscopy, Hyperlipemia, and Hypertension. Past Surgical History:  has a past surgical history that includes Tonsillectomy; Upper gastrointestinal endoscopy (09/20/12); sinus surgery; and Colonoscopy (N/A, 7/31/2019). Social History:  reports that he has been smoking cigarettes and cigars. He started smoking about 36 years ago. He has a 3.50 pack-year smoking history. He has never used smokeless tobacco. He reports current alcohol use. He reports that he does not use drugs. Family History: family history includes Alzheimer's Disease in his mother; Anxiety Disorder in his sister; No Known Problems in his father. Allergies: Patient has no known allergies. Medications:   Current Outpatient Medications   Medication Sig Dispense Refill    blood glucose test strips (ASCENSIA AUTODISC VI;ONE TOUCH ULTRA TEST VI) strip 1 each by In Vitro route daily As needed. 100 each 3    dapagliflozin (FARXIGA) 10 MG tablet Take 1 tablet by mouth every morning 90 tablet 3    losartan-hydroCHLOROthiazide (HYZAAR) 100-25 MG per tablet Take 1 tablet by mouth daily 90 tablet 3    montelukast (SINGULAIR) 10 MG tablet Take 1 tablet by mouth nightly 90 tablet 3    metFORMIN (GLUCOPHAGE-XR) 500 MG extended release tablet Take 2 tablets by mouth in the morning and 2 tablets before bedtime.  360 tablet 3    simvastatin (ZOCOR) 10 MG tablet TAKE 1 TABLET BY MOUTH ONE TIME A DAY 90 tablet 3

## 2023-06-15 PROBLEM — M54.14 THORACIC RADICULOPATHY: Status: ACTIVE | Noted: 2023-06-15

## 2023-06-15 PROBLEM — M47.814 THORACIC SPONDYLOSIS: Status: ACTIVE | Noted: 2023-06-15

## 2023-06-15 PROBLEM — M47.894 THORACIC FACET JOINT SYNDROME: Status: ACTIVE | Noted: 2023-06-15

## 2023-06-19 RX ORDER — LOSARTAN POTASSIUM AND HYDROCHLOROTHIAZIDE 25; 100 MG/1; MG/1
TABLET ORAL
Qty: 30 TABLET | Refills: 3 | Status: SHIPPED | OUTPATIENT
Start: 2023-06-19

## 2023-06-20 RX ORDER — GLIPIZIDE 5 MG/1
5 TABLET ORAL
Qty: 180 TABLET | Refills: 3 | Status: SHIPPED | OUTPATIENT
Start: 2023-06-20

## 2023-06-23 ENCOUNTER — HOSPITAL ENCOUNTER (OUTPATIENT)
Dept: GENERAL RADIOLOGY | Age: 58
End: 2023-06-23
Payer: COMMERCIAL

## 2023-06-23 ENCOUNTER — HOSPITAL ENCOUNTER (OUTPATIENT)
Age: 58
End: 2023-06-23
Payer: COMMERCIAL

## 2023-06-23 ENCOUNTER — HOSPITAL ENCOUNTER (OUTPATIENT)
Age: 58
Discharge: HOME | End: 2023-06-23
Payer: COMMERCIAL

## 2023-06-23 DIAGNOSIS — E78.00 PURE HYPERCHOLESTEROLEMIA: ICD-10-CM

## 2023-06-23 DIAGNOSIS — R63.5 WEIGHT GAIN: ICD-10-CM

## 2023-06-23 DIAGNOSIS — M54.14 THORACIC RADICULOPATHY: ICD-10-CM

## 2023-06-23 DIAGNOSIS — E55.9 VITAMIN D DEFICIENCY: ICD-10-CM

## 2023-06-23 DIAGNOSIS — E11.65 TYPE 2 DIABETES MELLITUS WITH HYPERGLYCEMIA, WITHOUT LONG-TERM CURRENT USE OF INSULIN (HCC): ICD-10-CM

## 2023-06-23 LAB
ALBUMIN SERPL-MCNC: 4.1 G/DL (ref 3.5–5.2)
ALP SERPL-CCNC: 52 U/L (ref 40–129)
ALT SERPL-CCNC: 24 U/L (ref 0–40)
ANION GAP SERPL CALCULATED.3IONS-SCNC: 11 MMOL/L (ref 7–16)
AST SERPL-CCNC: 20 U/L (ref 0–39)
BASOPHILS # BLD: 0.05 E9/L (ref 0–0.2)
BASOPHILS NFR BLD: 0.7 % (ref 0–2)
BILIRUB SERPL-MCNC: 0.7 MG/DL (ref 0–1.2)
BUN SERPL-MCNC: 11 MG/DL (ref 6–20)
CALCIUM SERPL-MCNC: 9.2 MG/DL (ref 8.6–10.2)
CHLORIDE SERPL-SCNC: 101 MMOL/L (ref 98–107)
CHOLESTEROL, TOTAL: 148 MG/DL (ref 0–199)
CO2 SERPL-SCNC: 26 MMOL/L (ref 22–29)
CREAT SERPL-MCNC: 0.7 MG/DL (ref 0.7–1.2)
EOSINOPHIL # BLD: 0.41 E9/L (ref 0.05–0.5)
EOSINOPHIL NFR BLD: 5.3 % (ref 0–6)
ERYTHROCYTE [DISTWIDTH] IN BLOOD BY AUTOMATED COUNT: 12.7 FL (ref 11.5–15)
GLUCOSE SERPL-MCNC: 175 MG/DL (ref 74–99)
HCT VFR BLD AUTO: 45.7 % (ref 37–54)
HDLC SERPL-MCNC: 34 MG/DL
HGB BLD-MCNC: 16.5 G/DL (ref 12.5–16.5)
IMM GRANULOCYTES # BLD: 0.05 E9/L
IMM GRANULOCYTES NFR BLD: 0.7 % (ref 0–5)
LDLC SERPL CALC-MCNC: 66 MG/DL (ref 0–99)
LYMPHOCYTES # BLD: 1.78 E9/L (ref 1.5–4)
LYMPHOCYTES NFR BLD: 23.2 % (ref 20–42)
MCH RBC QN AUTO: 33.4 PG (ref 26–35)
MCHC RBC AUTO-ENTMCNC: 36.1 % (ref 32–34.5)
MCV RBC AUTO: 92.5 FL (ref 80–99.9)
MONOCYTES # BLD: 0.57 E9/L (ref 0.1–0.95)
MONOCYTES NFR BLD: 7.4 % (ref 2–12)
NEUTROPHILS # BLD: 4.81 E9/L (ref 1.8–7.3)
NEUTS SEG NFR BLD: 62.7 % (ref 43–80)
PLATELET # BLD AUTO: 249 E9/L (ref 130–450)
PMV BLD AUTO: 9.9 FL (ref 7–12)
POTASSIUM SERPL-SCNC: 3.9 MMOL/L (ref 3.5–5)
PROT SERPL-MCNC: 7 G/DL (ref 6.4–8.3)
RBC # BLD AUTO: 4.94 E12/L (ref 3.8–5.8)
SODIUM SERPL-SCNC: 138 MMOL/L (ref 132–146)
T4 FREE SERPL-MCNC: 1.1 NG/DL (ref 0.93–1.7)
TRIGL SERPL-MCNC: 240 MG/DL (ref 0–149)
TSH SERPL-MCNC: 3.12 UIU/ML (ref 0.27–4.2)
VITAMIN D 25-HYDROXY: 40 NG/ML (ref 30–100)
VLDLC SERPL CALC-MCNC: 48 MG/DL
WBC # BLD: 7.7 E9/L (ref 4.5–11.5)

## 2023-06-23 PROCEDURE — 84439 ASSAY OF FREE THYROXINE: CPT

## 2023-06-23 PROCEDURE — 84681 ASSAY OF C-PEPTIDE: CPT

## 2023-06-23 PROCEDURE — 82306 VITAMIN D 25 HYDROXY: CPT

## 2023-06-23 PROCEDURE — 72072 X-RAY EXAM THORAC SPINE 3VWS: CPT

## 2023-06-23 PROCEDURE — 84443 ASSAY THYROID STIM HORMONE: CPT

## 2023-06-23 PROCEDURE — 80053 COMPREHEN METABOLIC PANEL: CPT

## 2023-06-23 PROCEDURE — 80061 LIPID PANEL: CPT

## 2023-06-23 PROCEDURE — 85025 COMPLETE CBC W/AUTO DIFF WBC: CPT

## 2023-06-23 PROCEDURE — 36415 COLL VENOUS BLD VENIPUNCTURE: CPT

## 2023-06-25 LAB — C PEPTIDE SERPL-MCNC: 2.9 NG/ML (ref 0.5–3.3)

## 2023-08-08 ENCOUNTER — OFFICE VISIT (OUTPATIENT)
Dept: PAIN MANAGEMENT | Age: 58
End: 2023-08-08
Payer: COMMERCIAL

## 2023-08-08 VITALS
HEART RATE: 104 BPM | DIASTOLIC BLOOD PRESSURE: 80 MMHG | BODY MASS INDEX: 33.94 KG/M2 | HEIGHT: 75 IN | RESPIRATION RATE: 18 BRPM | OXYGEN SATURATION: 97 % | TEMPERATURE: 97.3 F | SYSTOLIC BLOOD PRESSURE: 148 MMHG | WEIGHT: 273 LBS

## 2023-08-08 DIAGNOSIS — M54.14 THORACIC RADICULOPATHY: ICD-10-CM

## 2023-08-08 DIAGNOSIS — R10.11 RIGHT UPPER QUADRANT ABDOMINAL PAIN: ICD-10-CM

## 2023-08-08 DIAGNOSIS — G89.4 CHRONIC PAIN SYNDROME: Primary | ICD-10-CM

## 2023-08-08 DIAGNOSIS — M47.894 THORACIC FACET JOINT SYNDROME: ICD-10-CM

## 2023-08-08 DIAGNOSIS — M47.814 THORACIC SPONDYLOSIS: ICD-10-CM

## 2023-08-08 PROCEDURE — 99213 OFFICE O/P EST LOW 20 MIN: CPT | Performed by: PAIN MEDICINE

## 2023-08-08 PROCEDURE — 99214 OFFICE O/P EST MOD 30 MIN: CPT | Performed by: PAIN MEDICINE

## 2023-08-08 PROCEDURE — 3074F SYST BP LT 130 MM HG: CPT | Performed by: PAIN MEDICINE

## 2023-08-08 PROCEDURE — 3078F DIAST BP <80 MM HG: CPT | Performed by: PAIN MEDICINE

## 2023-08-08 RX ORDER — LOSARTAN POTASSIUM AND HYDROCHLOROTHIAZIDE 25; 100 MG/1; MG/1
TABLET ORAL
Qty: 90 TABLET | Refills: 3 | Status: SHIPPED | OUTPATIENT
Start: 2023-08-08

## 2023-08-08 NOTE — TELEPHONE ENCOUNTER
Last Appointment:  6/6/2023  Future Appointments   Date Time Provider [address]   8/8/2023  2:15 PM Shlomo Denton MD [facility]   9/8/2023  7:30 AM [facility]   2/26/2024  8:30 AM Cara Tucker DPM [facility]

## 2023-08-09 PROBLEM — G89.4 CHRONIC PAIN SYNDROME: Status: ACTIVE | Noted: 2023-08-09

## 2023-08-09 PROBLEM — R10.11 RIGHT UPPER QUADRANT ABDOMINAL PAIN: Status: ACTIVE | Noted: 2023-08-09

## 2023-08-14 ENCOUNTER — HOSPITAL ENCOUNTER (OUTPATIENT)
Dept: ULTRASOUND IMAGING | Age: 58
Discharge: HOME | End: 2023-08-16
Attending: PAIN MEDICINE
Payer: COMMERCIAL

## 2023-08-14 DIAGNOSIS — R10.11 RIGHT UPPER QUADRANT ABDOMINAL PAIN: ICD-10-CM

## 2023-08-14 PROCEDURE — 76705 ECHO EXAM OF ABDOMEN: CPT

## 2023-08-22 ENCOUNTER — SCHEDULED TELEPHONE ENCOUNTER (OUTPATIENT)
Dept: PAIN MANAGEMENT | Age: 58
End: 2023-08-22
Payer: COMMERCIAL

## 2023-08-22 DIAGNOSIS — R10.11 RIGHT UPPER QUADRANT ABDOMINAL PAIN: ICD-10-CM

## 2023-08-22 DIAGNOSIS — E11.69 DIABETES MELLITUS TYPE 2 IN OBESE (HCC): ICD-10-CM

## 2023-08-22 DIAGNOSIS — G89.4 CHRONIC PAIN SYNDROME: Primary | ICD-10-CM

## 2023-08-22 DIAGNOSIS — M47.814 THORACIC SPONDYLOSIS: ICD-10-CM

## 2023-08-22 DIAGNOSIS — M47.894 THORACIC FACET JOINT SYNDROME: ICD-10-CM

## 2023-08-22 DIAGNOSIS — M54.14 THORACIC RADICULOPATHY: ICD-10-CM

## 2023-08-22 DIAGNOSIS — E66.9 DIABETES MELLITUS TYPE 2 IN OBESE (HCC): ICD-10-CM

## 2023-08-22 PROCEDURE — 99442 PR PHYS/QHP TELEPHONE EVALUATION 11-20 MIN: CPT | Performed by: PAIN MEDICINE

## 2023-08-22 RX ORDER — SODIUM CHLORIDE 9 MG/ML
INJECTION, SOLUTION INTRAVENOUS PRN
OUTPATIENT
Start: 2023-08-22

## 2023-08-22 RX ORDER — SODIUM CHLORIDE 0.9 % (FLUSH) 0.9 %
5-40 SYRINGE (ML) INJECTION EVERY 12 HOURS SCHEDULED
OUTPATIENT
Start: 2023-08-22

## 2023-08-22 RX ORDER — SODIUM CHLORIDE 0.9 % (FLUSH) 0.9 %
5-40 SYRINGE (ML) INJECTION PRN
OUTPATIENT
Start: 2023-08-22

## 2023-08-22 NOTE — PROGRESS NOTES
[address], [address] [place] [address], [address] [place] [address]  [phone]  Telephone follow up visit      Date of Visit:  8/22/2023    CC:     Consent  The patient and/or health care decision maker is aware that he/she may receive a bill for this telephone service, depending on his insurance coverage, and has provided verbal consent to proceed: Yes    I have considered the risks of abuse, dependence, addiction and diversion. My patient is aware that they will need a follow-up visit (in-person or virtually) at the appropriate time indicated for continued medications. Further, my patient is aware that when this acute crisis has lifted, they will be expected to return for an in-person visit and all elements of standard local and hospital guidelines in order to continue this medication. Patient location: Home   Physician Location:Other address in [place]    HPI:  Patient mentioned that his pain is more localized in the right upper quadrant  Appropriate analgesia with current medications regimen:yes fair    Change in quality of symptoms:yes . Medication side effects:none  Recent diagnostic testing:RUQ ultrasound  Recent interventional procedures:none. .     He has not been on anticoagulation medications to include none. The patient  has not been on herbal supplements. The patient is diabetic. Imaging:   Cervical spine MRI 2022:  1. Degenerative change with multiple mild disc bulges. No evidence of  significant central canal stenosis. 2. Left-sided disc osteophyte complex at C5-6 causing moderate to severe left  neural foraminal stenosis. This could compress the left C6 nerve root. 3. Tiny left paramedian disc protrusion at C4-5 and small left posterolateral  disc protrusion at C7-T1. These are felt to be of doubtful significance. Gall bladder Ultrasound normal     Previous treatments: medications. .       Potential Aberrant Drug-Related Behavior:    No     Urine Drug Addended by: BECKI NUÑEZ on: 12/3/2019 02:57 PM     Modules accepted: Orders

## 2023-08-22 NOTE — TELEPHONE ENCOUNTER
Last Appointment:  6/6/2023  Future Appointments   Date Time Provider [address]   8/22/2023  4:00 PM Ami Adams MD [facility]   9/8/2023  7:30 AM MD Karina Kirbynusrat [facility] [place] [facility]   2/26/2024  8:30 [facility]

## 2023-08-23 RX ORDER — METFORMIN HYDROCHLORIDE 500 MG/1
TABLET, EXTENDED RELEASE ORAL
Qty: 360 TABLET | Refills: 3 | Status: SHIPPED | OUTPATIENT
Start: 2023-08-23

## 2023-09-05 ENCOUNTER — PREP FOR PROCEDURE (OUTPATIENT)
Dept: PAIN MANAGEMENT | Age: 58
End: 2023-09-05

## 2023-09-06 ENCOUNTER — TELEPHONE (OUTPATIENT)
Dept: PAIN MANAGEMENT | Age: 58
End: 2023-09-06

## 2023-10-05 RX ORDER — LOSARTAN POTASSIUM AND HYDROCHLOROTHIAZIDE 25; 100 MG/1; MG/1
1 TABLET ORAL DAILY
Qty: 30 TABLET | Refills: 3 | OUTPATIENT
Start: 2023-10-05

## 2024-01-06 DIAGNOSIS — I10 PRIMARY HYPERTENSION: Primary | ICD-10-CM

## 2024-01-06 DIAGNOSIS — J30.2 SEASONAL ALLERGIES: ICD-10-CM

## 2024-01-08 RX ORDER — MONTELUKAST SODIUM 10 MG/1
10 TABLET ORAL NIGHTLY
Qty: 90 TABLET | Refills: 0 | Status: SHIPPED | OUTPATIENT
Start: 2024-01-08

## 2024-01-08 RX ORDER — LOSARTAN POTASSIUM AND HYDROCHLOROTHIAZIDE 25; 100 MG/1; MG/1
1 TABLET ORAL DAILY
Qty: 90 TABLET | Refills: 0 | Status: SHIPPED | OUTPATIENT
Start: 2024-01-08

## 2024-01-09 DIAGNOSIS — E78.00 PURE HYPERCHOLESTEROLEMIA: ICD-10-CM

## 2024-01-09 RX ORDER — SIMVASTATIN 10 MG
TABLET ORAL
Qty: 90 TABLET | Refills: 0 | Status: SHIPPED | OUTPATIENT
Start: 2024-01-09

## 2024-01-17 ENCOUNTER — OFFICE VISIT (OUTPATIENT)
Dept: FAMILY MEDICINE CLINIC | Age: 59
End: 2024-01-17
Payer: COMMERCIAL

## 2024-01-17 VITALS
TEMPERATURE: 96.9 F | DIASTOLIC BLOOD PRESSURE: 92 MMHG | HEART RATE: 98 BPM | HEIGHT: 75 IN | RESPIRATION RATE: 20 BRPM | WEIGHT: 272 LBS | OXYGEN SATURATION: 98 % | BODY MASS INDEX: 33.82 KG/M2 | SYSTOLIC BLOOD PRESSURE: 153 MMHG

## 2024-01-17 DIAGNOSIS — E11.65 TYPE 2 DIABETES MELLITUS WITH HYPERGLYCEMIA, WITHOUT LONG-TERM CURRENT USE OF INSULIN (HCC): Primary | ICD-10-CM

## 2024-01-17 DIAGNOSIS — E11.69 DIABETES MELLITUS TYPE 2 IN OBESE (HCC): ICD-10-CM

## 2024-01-17 DIAGNOSIS — E66.9 DIABETES MELLITUS TYPE 2 IN OBESE (HCC): ICD-10-CM

## 2024-01-17 DIAGNOSIS — I10 PRIMARY HYPERTENSION: ICD-10-CM

## 2024-01-17 LAB — HBA1C MFR BLD: 8.6 %

## 2024-01-17 PROCEDURE — 3077F SYST BP >= 140 MM HG: CPT | Performed by: STUDENT IN AN ORGANIZED HEALTH CARE EDUCATION/TRAINING PROGRAM

## 2024-01-17 PROCEDURE — 3080F DIAST BP >= 90 MM HG: CPT | Performed by: STUDENT IN AN ORGANIZED HEALTH CARE EDUCATION/TRAINING PROGRAM

## 2024-01-17 PROCEDURE — 3052F HG A1C>EQUAL 8.0%<EQUAL 9.0%: CPT | Performed by: STUDENT IN AN ORGANIZED HEALTH CARE EDUCATION/TRAINING PROGRAM

## 2024-01-17 PROCEDURE — 99214 OFFICE O/P EST MOD 30 MIN: CPT | Performed by: STUDENT IN AN ORGANIZED HEALTH CARE EDUCATION/TRAINING PROGRAM

## 2024-01-17 PROCEDURE — 83036 HEMOGLOBIN GLYCOSYLATED A1C: CPT | Performed by: STUDENT IN AN ORGANIZED HEALTH CARE EDUCATION/TRAINING PROGRAM

## 2024-01-17 RX ORDER — AMLODIPINE BESYLATE 5 MG/1
5 TABLET ORAL NIGHTLY
Qty: 90 TABLET | Refills: 3 | Status: SHIPPED | OUTPATIENT
Start: 2024-01-17

## 2024-01-17 SDOH — ECONOMIC STABILITY: FOOD INSECURITY: WITHIN THE PAST 12 MONTHS, YOU WORRIED THAT YOUR FOOD WOULD RUN OUT BEFORE YOU GOT MONEY TO BUY MORE.: NEVER TRUE

## 2024-01-17 SDOH — ECONOMIC STABILITY: FOOD INSECURITY: WITHIN THE PAST 12 MONTHS, THE FOOD YOU BOUGHT JUST DIDN'T LAST AND YOU DIDN'T HAVE MONEY TO GET MORE.: NEVER TRUE

## 2024-01-17 SDOH — ECONOMIC STABILITY: INCOME INSECURITY: HOW HARD IS IT FOR YOU TO PAY FOR THE VERY BASICS LIKE FOOD, HOUSING, MEDICAL CARE, AND HEATING?: NOT HARD AT ALL

## 2024-01-17 ASSESSMENT — PATIENT HEALTH QUESTIONNAIRE - PHQ9
2. FEELING DOWN, DEPRESSED OR HOPELESS: 0
SUM OF ALL RESPONSES TO PHQ QUESTIONS 1-9: 0
1. LITTLE INTEREST OR PLEASURE IN DOING THINGS: 0
SUM OF ALL RESPONSES TO PHQ QUESTIONS 1-9: 0
SUM OF ALL RESPONSES TO PHQ9 QUESTIONS 1 & 2: 0

## 2024-01-17 NOTE — PROGRESS NOTES
Josh Valdez Jr. (:  1965) is a 58 y.o. male, established patient follow up , here for evaluation of the following:  No chief complaint on file.         ASSESSMENT/PLAN      1. Type 2 diabetes mellitus with hyperglycemia, without long-term current use of insulin (HCC)  Chronic, not well-controlled, A1c at 8.6, will start Januvia, continue metformin, and angle KEITH, glipizide, discussed healthy lifestyle exercise and nutrition, he does have good knowledge, plans to put into practice  -     POCT glycosylated hemoglobin (Hb A1C)  -     SITagliptin (JANUVIA) 50 MG tablet; Take 1 tablet by mouth daily, Disp-90 tablet, R-1Normal  2. Diabetes mellitus type 2 in obese (HCC)  3. Primary hypertension  Chronic, not well-controlled, will add amlodipine at night  -     amLODIPine (NORVASC) 5 MG tablet; Take 1 tablet by mouth at bedtime, Disp-90 tablet, R-3Normal    Return in about 3 months (around 2024) for Diabetes follow up.         Subjective   SUBJECTIVE/OBJECTIVE:  HPI  Patient is here for diabetes follow-up visit, he is disappointed to see his A1c is gone up although he notes he has been under a lot of stress and has been not eating and exercising the way he knows would help with the diabetes, a lot of stress with work, life, is willing to start new medications, does have some concern for side effects of things such as the GLP-1 inhibitors, also had side effect with increased glipizide, did also discussed that he has had low C-peptide in the past which means that he may eventually need insulin  Will get microalbumin at the next visit    Declined preventative screening identified as care gaps unless ordered through this visit    PHQ2/PHQ9  PHQ-2 Score: 0  PHQ-2 Over the past 2 weeks, how often have you been bothered by any of the following problems?  Little interest or pleasure in doing things: Not at all  Feeling down, depressed, or hopeless: Not at all  PHQ-2 Score: 0   PHQ-9 Total Score: 0 (2024  8:40

## 2024-01-17 NOTE — PATIENT INSTRUCTIONS
I do recommend magnesium 200-600 mg at your bedtime for muscle relaxation, restless legs, mind calming, reduces head aches, and a more restful night sleep. Oxide and citrate form help with constipation but can cause diarrhea, other forms of magnesium like gluconate and glycinate are better is you don't have constipation.

## 2024-01-18 ASSESSMENT — ENCOUNTER SYMPTOMS
CONSTIPATION: 0
DIARRHEA: 0
WHEEZING: 0
NAUSEA: 0
COUGH: 0
BLOOD IN STOOL: 0
ABDOMINAL PAIN: 0
SHORTNESS OF BREATH: 0

## 2024-02-06 RX ORDER — DAPAGLIFLOZIN 10 MG/1
10 TABLET, FILM COATED ORAL EVERY MORNING
Qty: 90 TABLET | Refills: 3 | Status: SHIPPED | OUTPATIENT
Start: 2024-02-06

## 2024-02-13 DIAGNOSIS — E11.65 TYPE 2 DIABETES MELLITUS WITH HYPERGLYCEMIA, WITHOUT LONG-TERM CURRENT USE OF INSULIN (HCC): ICD-10-CM

## 2024-03-01 ENCOUNTER — TELEPHONE (OUTPATIENT)
Dept: FAMILY MEDICINE CLINIC | Age: 59
End: 2024-03-01

## 2024-03-10 DIAGNOSIS — J30.2 SEASONAL ALLERGIES: ICD-10-CM

## 2024-03-11 RX ORDER — MONTELUKAST SODIUM 10 MG/1
10 TABLET ORAL NIGHTLY
Qty: 90 TABLET | Refills: 0 | Status: SHIPPED | OUTPATIENT
Start: 2024-03-11

## 2024-04-24 ENCOUNTER — OFFICE VISIT (OUTPATIENT)
Dept: FAMILY MEDICINE CLINIC | Age: 59
End: 2024-04-24
Payer: COMMERCIAL

## 2024-04-24 VITALS
DIASTOLIC BLOOD PRESSURE: 101 MMHG | HEIGHT: 75 IN | BODY MASS INDEX: 34.32 KG/M2 | RESPIRATION RATE: 20 BRPM | SYSTOLIC BLOOD PRESSURE: 150 MMHG | WEIGHT: 276 LBS | OXYGEN SATURATION: 97 % | HEART RATE: 98 BPM | TEMPERATURE: 96.1 F

## 2024-04-24 DIAGNOSIS — I10 PRIMARY HYPERTENSION: ICD-10-CM

## 2024-04-24 DIAGNOSIS — Z12.5 SCREENING FOR PROSTATE CANCER: ICD-10-CM

## 2024-04-24 DIAGNOSIS — E11.65 TYPE 2 DIABETES MELLITUS WITH HYPERGLYCEMIA, WITHOUT LONG-TERM CURRENT USE OF INSULIN (HCC): Primary | ICD-10-CM

## 2024-04-24 DIAGNOSIS — E55.9 VITAMIN D DEFICIENCY: ICD-10-CM

## 2024-04-24 DIAGNOSIS — E78.00 PURE HYPERCHOLESTEROLEMIA: ICD-10-CM

## 2024-04-24 DIAGNOSIS — I10 WHITE COAT SYNDROME WITH DIAGNOSIS OF HYPERTENSION: ICD-10-CM

## 2024-04-24 LAB
CREATININE URINE POCT: NORMAL
HBA1C MFR BLD: 8.6 %
MICROALBUMIN/CREAT 24H UR: NORMAL MG/G{CREAT}
MICROALBUMIN/CREAT UR-RTO: NORMAL

## 2024-04-24 PROCEDURE — 82044 UR ALBUMIN SEMIQUANTITATIVE: CPT | Performed by: STUDENT IN AN ORGANIZED HEALTH CARE EDUCATION/TRAINING PROGRAM

## 2024-04-24 PROCEDURE — 3080F DIAST BP >= 90 MM HG: CPT | Performed by: STUDENT IN AN ORGANIZED HEALTH CARE EDUCATION/TRAINING PROGRAM

## 2024-04-24 PROCEDURE — G2211 COMPLEX E/M VISIT ADD ON: HCPCS | Performed by: STUDENT IN AN ORGANIZED HEALTH CARE EDUCATION/TRAINING PROGRAM

## 2024-04-24 PROCEDURE — 3052F HG A1C>EQUAL 8.0%<EQUAL 9.0%: CPT | Performed by: STUDENT IN AN ORGANIZED HEALTH CARE EDUCATION/TRAINING PROGRAM

## 2024-04-24 PROCEDURE — 83036 HEMOGLOBIN GLYCOSYLATED A1C: CPT | Performed by: STUDENT IN AN ORGANIZED HEALTH CARE EDUCATION/TRAINING PROGRAM

## 2024-04-24 PROCEDURE — 99214 OFFICE O/P EST MOD 30 MIN: CPT | Performed by: STUDENT IN AN ORGANIZED HEALTH CARE EDUCATION/TRAINING PROGRAM

## 2024-04-24 PROCEDURE — 3077F SYST BP >= 140 MM HG: CPT | Performed by: STUDENT IN AN ORGANIZED HEALTH CARE EDUCATION/TRAINING PROGRAM

## 2024-04-24 RX ORDER — LANCETS 28 GAUGE
EACH MISCELLANEOUS
COMMUNITY
Start: 2024-03-08

## 2024-04-24 RX ORDER — BLOOD-GLUCOSE METER
EACH MISCELLANEOUS
COMMUNITY
Start: 2024-03-08

## 2024-04-24 NOTE — PROGRESS NOTES
Josh Valdez Jr. (:  1965) is a 59 y.o. male, established patient, here for evaluation of the following:  Diabetes      Assessment & Plan   ASSESSMENT/PLAN  He is due for labs prior to next visit, these were ordered through this visit    1. Type 2 diabetes mellitus with hyperglycemia, without long-term current use of insulin (HCC)  Chronic, not well-controlled, he has not been over 8 for about 3 years, did discuss the significance of this and the possible complications, no change in A1c with DDP 4, will trial tirzepatide, with low C-peptide may need insulin in the future, no protein in urine, he is on a statin and an ARB, recommended foot and eye exams  -     POCT glycosylated hemoglobin (Hb A1C)  -     Tirzepatide (MOUNJARO) 5 MG/0.5ML SOPN SC injection; Inject 0.5 mLs into the skin once a week, Disp-4 Adjustable Dose Pre-filled Pen Syringe, R-0I think this come in 1 pen per dose so I ordered 4 pens for  month pleaseNormal  -     POCT microalbumin  -     C-Peptide; Future  2. Primary hypertension  Chronic, well-controlled at home, elevated here, he notes it is likely whitecoat hypertension, he has had this for most of his life, will keep medications as they are as he has good blood pressures at home  -     TSH; Future  -     Comprehensive Metabolic Panel; Future  -     CBC with Auto Differential; Future  3. White coat syndrome with diagnosis of hypertension  4. Screening for prostate cancer  -     PSA Screening; Future  5. Pure hypercholesterolemia  -     Lipid Panel; Future  6. Vitamin D deficiency  -     Vitamin D 25 Hydroxy; Future    Return in about 3 months (around 2024) for Diabetes follow up.         Subjective   SUBJECTIVE/OBJECTIVE:  HPI    Patient is here for follow-up, last A1c was 8.6, today also 8.6, we did start Januvia, he is currently on metformin, farxiga, glipizide as well, thoroughly discussed healthy lifestyle, in the past we have checked a C-peptide which was low with a sugar of

## 2024-04-25 DIAGNOSIS — E11.65 TYPE 2 DIABETES MELLITUS WITH HYPERGLYCEMIA, WITHOUT LONG-TERM CURRENT USE OF INSULIN (HCC): ICD-10-CM

## 2024-05-13 ENCOUNTER — OFFICE VISIT (OUTPATIENT)
Dept: PRIMARY CARE CLINIC | Age: 59
End: 2024-05-13
Payer: COMMERCIAL

## 2024-05-13 VITALS
DIASTOLIC BLOOD PRESSURE: 84 MMHG | OXYGEN SATURATION: 95 % | TEMPERATURE: 97.7 F | SYSTOLIC BLOOD PRESSURE: 135 MMHG | WEIGHT: 272 LBS | HEART RATE: 90 BPM | BODY MASS INDEX: 33.82 KG/M2 | HEIGHT: 75 IN

## 2024-05-13 DIAGNOSIS — R05.9 COUGH IN ADULT PATIENT: ICD-10-CM

## 2024-05-13 DIAGNOSIS — J22 ACUTE LOWER RESPIRATORY INFECTION: Primary | ICD-10-CM

## 2024-05-13 PROCEDURE — 3075F SYST BP GE 130 - 139MM HG: CPT | Performed by: NURSE PRACTITIONER

## 2024-05-13 PROCEDURE — 99213 OFFICE O/P EST LOW 20 MIN: CPT | Performed by: NURSE PRACTITIONER

## 2024-05-13 PROCEDURE — 3079F DIAST BP 80-89 MM HG: CPT | Performed by: NURSE PRACTITIONER

## 2024-05-13 RX ORDER — DOXYCYCLINE HYCLATE 100 MG
100 TABLET ORAL 2 TIMES DAILY
Qty: 20 TABLET | Refills: 0 | Status: SHIPPED | OUTPATIENT
Start: 2024-05-13 | End: 2024-05-23

## 2024-05-13 RX ORDER — BROMPHENIRAMINE MALEATE, PSEUDOEPHEDRINE HYDROCHLORIDE, AND DEXTROMETHORPHAN HYDROBROMIDE 2; 30; 10 MG/5ML; MG/5ML; MG/5ML
5 SYRUP ORAL 4 TIMES DAILY PRN
Qty: 118 ML | Refills: 0 | Status: SHIPPED | OUTPATIENT
Start: 2024-05-13

## 2024-05-13 NOTE — PROGRESS NOTES
Chief Complaint:   Congestion (Chest congestion x 2 days) and Cough (Non productive cough )      History of Present Illness   Source of history provided by:  patient.      Josh Valdez Jr. is a 59 y.o. old male with a past medical history of:   Past Medical History:   Diagnosis Date    Diabetes mellitus (HCC)     Encounter for screening colonoscopy     Hyperlipemia     Hypertension        Pt  presents to the [facility] with a cough/chest congestion for the past few days.  States the cough is non productive. No fever noted.   Denies any N/V/D, abdominal pain, CP, progressive SOB, dizziness, or lethargy.         ROS    Unless otherwise stated in this report or unable to obtain because of the patient's clinical or mental status as evidenced by the medical record, this patients's positive and negative responses for Review of Systems, constitutional, psych, eyes, ENT, cardiovascular, respiratory, gastrointestinal, neurological, genitourinary, musculoskeletal, integument systems and systems related to the presenting problem are either stated in the preceding or were not pertinent or were negative for the symptoms and/or complaints related to the medical problem.    Past Surgical History:  has a past surgical history that includes Tonsillectomy; Upper gastrointestinal endoscopy (09/20/12); sinus surgery; and Colonoscopy (N/A, 7/31/2019).  Social History:  reports that he has never smoked. He has been exposed to tobacco smoke. He has never used smokeless tobacco. He reports current alcohol use of about 3.0 - 4.0 standard drinks of alcohol per week. He reports that he does not use drugs.  Family History: family history includes Alzheimer's Disease in his mother; Anxiety Disorder in his sister; No Known Problems in his father.   Allergies: Patient has no known allergies.    Physical Exam         VS:  /84   Pulse 90   Temp 97.7 °F (36.5 °C)   Ht 1.905 m (6' 3\")   Wt 123.4 kg (272 lb)   SpO2 95%   BMI 34.00 kg/m²

## 2024-05-15 ENCOUNTER — HOSPITAL ENCOUNTER (OUTPATIENT)
Dept: GENERAL RADIOLOGY | Age: 59
Discharge: HOME | End: 2024-05-17
Payer: COMMERCIAL

## 2024-05-15 ENCOUNTER — HOSPITAL ENCOUNTER (OUTPATIENT)
Age: 59
Discharge: HOME | End: 2024-05-15
Payer: COMMERCIAL

## 2024-05-15 ENCOUNTER — OFFICE VISIT (OUTPATIENT)
Dept: PRIMARY CARE CLINIC | Age: 59
End: 2024-05-15
Payer: COMMERCIAL

## 2024-05-15 ENCOUNTER — HOSPITAL ENCOUNTER (OUTPATIENT)
Age: 59
Discharge: HOME | End: 2024-05-17
Payer: COMMERCIAL

## 2024-05-15 VITALS
HEIGHT: 75 IN | HEART RATE: 96 BPM | OXYGEN SATURATION: 97 % | TEMPERATURE: 98.4 F | RESPIRATION RATE: 19 BRPM | SYSTOLIC BLOOD PRESSURE: 132 MMHG | DIASTOLIC BLOOD PRESSURE: 92 MMHG | BODY MASS INDEX: 33.69 KG/M2 | WEIGHT: 271 LBS

## 2024-05-15 DIAGNOSIS — J22 ACUTE LOWER RESPIRATORY INFECTION: Primary | ICD-10-CM

## 2024-05-15 DIAGNOSIS — R05.3 PERSISTENT COUGH: ICD-10-CM

## 2024-05-15 DIAGNOSIS — J22 ACUTE LOWER RESPIRATORY INFECTION: ICD-10-CM

## 2024-05-15 LAB
INFLUENZA A ANTIGEN, POC: NEGATIVE
INFLUENZA B ANTIGEN, POC: NEGATIVE
LOT EXPIRE DATE: NORMAL
LOT KIT NUMBER: NORMAL
SARS-COV-2, POC: NORMAL
VALID INTERNAL CONTROL: NORMAL
VENDOR AND KIT NAME POC: NORMAL

## 2024-05-15 PROCEDURE — 3075F SYST BP GE 130 - 139MM HG: CPT | Performed by: NURSE PRACTITIONER

## 2024-05-15 PROCEDURE — 99213 OFFICE O/P EST LOW 20 MIN: CPT | Performed by: NURSE PRACTITIONER

## 2024-05-15 PROCEDURE — 87428 SARSCOV & INF VIR A&B AG IA: CPT | Performed by: NURSE PRACTITIONER

## 2024-05-15 PROCEDURE — 3080F DIAST BP >= 90 MM HG: CPT | Performed by: NURSE PRACTITIONER

## 2024-05-15 PROCEDURE — 71046 X-RAY EXAM CHEST 2 VIEWS: CPT

## 2024-05-15 RX ORDER — PREDNISONE 10 MG/1
10 TABLET ORAL 2 TIMES DAILY
Qty: 10 TABLET | Refills: 0 | Status: SHIPPED | OUTPATIENT
Start: 2024-05-15 | End: 2024-05-20

## 2024-05-15 RX ORDER — DEXTROMETHORPHAN HYDROBROMIDE AND PROMETHAZINE HYDROCHLORIDE 15; 6.25 MG/5ML; MG/5ML
5 SYRUP ORAL 4 TIMES DAILY PRN
Qty: 180 ML | Refills: 0 | Status: SHIPPED | OUTPATIENT
Start: 2024-05-15 | End: 2024-05-22

## 2024-05-15 NOTE — PROGRESS NOTES
Chief Complaint:   Cough (Patient was seen two days ago for same issues )      History of Present Illness   Source of history provided by:  patient.      Josh Valdez Jr. is a 59 y.o. old male with a past medical history of:   Past Medical History:   Diagnosis Date    Diabetes mellitus (HCC)     Encounter for screening colonoscopy     Hyperlipemia     Hypertension         Pt presents to the [facility] with a cough/chest congestion. Pt was seen 2 days ago and dx with lower respiratory infection. Pt is currently prescribed Doxycycline and Bromfed PRN.   States the cough is  productive and persistent.  No  fever noted.    Denies any N/V/D, abdominal pain, CP, progressive SOB, dizziness, or lethargy.         ROS    Unless otherwise stated in this report or unable to obtain because of the patient's clinical or mental status as evidenced by the medical record, this patients's positive and negative responses for Review of Systems, constitutional, psych, eyes, ENT, cardiovascular, respiratory, gastrointestinal, neurological, genitourinary, musculoskeletal, integument systems and systems related to the presenting problem are either stated in the preceding or were not pertinent or were negative for the symptoms and/or complaints related to the medical problem.    Past Surgical History:  has a past surgical history that includes Tonsillectomy; Upper gastrointestinal endoscopy (09/20/12); sinus surgery; and Colonoscopy (N/A, 7/31/2019).  Social History:  reports that he has never smoked. He has been exposed to tobacco smoke. He has never used smokeless tobacco. He reports current alcohol use of about 3.0 - 4.0 standard drinks of alcohol per week. He reports that he does not use drugs.  Family History: family history includes Alzheimer's Disease in his mother; Anxiety Disorder in his sister; No Known Problems in his father.   Allergies: Patient has no known allergies.    Physical Exam         VS:  BP (!) 132/92   Pulse 96

## 2024-05-17 ENCOUNTER — TELEPHONE (OUTPATIENT)
Dept: FAMILY MEDICINE CLINIC | Age: 59
End: 2024-05-17

## 2024-05-17 DIAGNOSIS — R05.2 SUBACUTE COUGH: Primary | ICD-10-CM

## 2024-05-17 RX ORDER — SITAGLIPTIN 50 MG/1
50 TABLET, FILM COATED ORAL DAILY
COMMUNITY
Start: 2024-05-16

## 2024-05-17 RX ORDER — ALBUTEROL SULFATE 90 UG/1
2 AEROSOL, METERED RESPIRATORY (INHALATION) 4 TIMES DAILY PRN
Qty: 18 G | Refills: 5 | Status: SHIPPED | OUTPATIENT
Start: 2024-05-17

## 2024-05-17 NOTE — TELEPHONE ENCOUNTER
1. Subacute cough  -     albuterol sulfate HFA (VENTOLIN HFA) 108 (90 Base) MCG/ACT inhaler; Inhale 2 puffs into the lungs 4 times daily as needed for Wheezing, Disp-18 g, R-5Normal

## 2024-05-17 NOTE — TELEPHONE ENCOUNTER
PT'S WIFE CALLED, PT WAS SEEN IN WALK IN TWICE THIS WEEK FOR A LASTING COUGH  IT IS A DRY COUGH,HE IS HORSE, WHEN COUGHING,CANNOT CATCH BREATH.  PT REQUESTED A CALL FROM DR FRENCH TO SEE IF THERE IS ANYTHING ELSE THAT CAN BE DONE FOR HIM.    PLEASE CALL JUSTIN [phone]

## 2024-05-28 DIAGNOSIS — E11.65 TYPE 2 DIABETES MELLITUS WITH HYPERGLYCEMIA, WITHOUT LONG-TERM CURRENT USE OF INSULIN (HCC): ICD-10-CM

## 2024-05-28 NOTE — TELEPHONE ENCOUNTER
Pharmacy will not fill the Monjaro at 5 mg, states there is a starter dose pt needs first, please send new script

## 2024-05-29 NOTE — TELEPHONE ENCOUNTER
1. Type 2 diabetes mellitus with hyperglycemia, without long-term current use of insulin (HCC)  -     Tirzepatide (MOUNJARO) 2.5 MG/0.5ML SOPN SC injection; Inject 0.5 mLs into the skin once a week, Disp-4 each, R-0Normal

## 2024-06-17 ENCOUNTER — OFFICE VISIT (OUTPATIENT)
Dept: PRIMARY CARE CLINIC | Age: 59
End: 2024-06-17
Payer: COMMERCIAL

## 2024-06-17 VITALS
RESPIRATION RATE: 18 BRPM | HEART RATE: 93 BPM | TEMPERATURE: 98.9 F | HEIGHT: 75 IN | DIASTOLIC BLOOD PRESSURE: 74 MMHG | BODY MASS INDEX: 34.27 KG/M2 | SYSTOLIC BLOOD PRESSURE: 126 MMHG | OXYGEN SATURATION: 93 % | WEIGHT: 275.6 LBS

## 2024-06-17 DIAGNOSIS — J10.1 INFLUENZA A: ICD-10-CM

## 2024-06-17 DIAGNOSIS — R05.3 PERSISTENT COUGH: ICD-10-CM

## 2024-06-17 LAB
INFLUENZA A ANTIGEN, POC: POSITIVE
INFLUENZA B ANTIGEN, POC: NEGATIVE
LOT EXPIRE DATE: NORMAL
LOT KIT NUMBER: NORMAL
SARS-COV-2, POC: NORMAL
VALID INTERNAL CONTROL: NORMAL
VENDOR AND KIT NAME POC: NORMAL

## 2024-06-17 PROCEDURE — 3074F SYST BP LT 130 MM HG: CPT | Performed by: NURSE PRACTITIONER

## 2024-06-17 PROCEDURE — 3078F DIAST BP <80 MM HG: CPT | Performed by: NURSE PRACTITIONER

## 2024-06-17 PROCEDURE — 99213 OFFICE O/P EST LOW 20 MIN: CPT | Performed by: NURSE PRACTITIONER

## 2024-06-17 PROCEDURE — 87428 SARSCOV & INF VIR A&B AG IA: CPT | Performed by: NURSE PRACTITIONER

## 2024-06-17 RX ORDER — PREDNISONE 10 MG/1
10 TABLET ORAL 2 TIMES DAILY
Qty: 10 TABLET | Refills: 0 | Status: SHIPPED | OUTPATIENT
Start: 2024-06-17 | End: 2024-06-22

## 2024-06-17 RX ORDER — OSELTAMIVIR PHOSPHATE 75 MG/1
75 CAPSULE ORAL 2 TIMES DAILY
Qty: 10 CAPSULE | Refills: 0 | Status: SHIPPED | OUTPATIENT
Start: 2024-06-17 | End: 2024-06-22

## 2024-06-17 RX ORDER — DEXTROMETHORPHAN HYDROBROMIDE AND PROMETHAZINE HYDROCHLORIDE 15; 6.25 MG/5ML; MG/5ML
5 SYRUP ORAL 4 TIMES DAILY PRN
Qty: 180 ML | Refills: 0 | Status: SHIPPED | OUTPATIENT
Start: 2024-06-17 | End: 2024-06-24

## 2024-06-17 NOTE — PROGRESS NOTES
Chief Complaint:   Fatigue, Cough, and Fever (101 last night )      History of Present Illness   Source of history provided by:  patient.      oJsh Valdez Jr. is a 59 y.o. old male with a past medical history of:   Past Medical History:   Diagnosis Date    Diabetes mellitus (HCC)     Encounter for screening colonoscopy     Hyperlipemia     Hypertension       Pt presents to the [facility] with a cough/fatigue/congestion and fevers for the past 2 days.  States the cough is persistent.   Pt is currently afebrile.   Pt recently returned home form [place] over the weekend Denies any N/V/D, abdominal pain, CP, progressive SOB, dizziness, or lethargy.       ROS    Unless otherwise stated in this report or unable to obtain because of the patient's clinical or mental status as evidenced by the medical record, this patients's positive and negative responses for Review of Systems, constitutional, psych, eyes, ENT, cardiovascular, respiratory, gastrointestinal, neurological, genitourinary, musculoskeletal, integument systems and systems related to the presenting problem are either stated in the preceding or were not pertinent or were negative for the symptoms and/or complaints related to the medical problem.    Past Surgical History:  has a past surgical history that includes Tonsillectomy; Upper gastrointestinal endoscopy (09/20/12); sinus surgery; and Colonoscopy (N/A, 7/31/2019).  Social History:  reports that he has never smoked. He has been exposed to tobacco smoke. He has never used smokeless tobacco. He reports current alcohol use of about 3.0 - 4.0 standard drinks of alcohol per week. He reports that he does not use drugs.  Family History: family history includes Alzheimer's Disease in his mother; Anxiety Disorder in his sister; No Known Problems in his father.   Allergies: Patient has no known allergies.    Physical Exam         VS:  /74   Pulse 93   Temp 98.9 °F (37.2 °C) (Temporal)   Resp 18   Ht

## 2024-06-20 DIAGNOSIS — J30.2 SEASONAL ALLERGIES: ICD-10-CM

## 2024-06-20 RX ORDER — MONTELUKAST SODIUM 10 MG/1
10 TABLET ORAL NIGHTLY
Qty: 90 TABLET | Refills: 3 | Status: SHIPPED | OUTPATIENT
Start: 2024-06-20

## 2024-07-01 LAB — DIABETIC RETINOPATHY: NEGATIVE

## 2024-07-19 ENCOUNTER — HOSPITAL ENCOUNTER (OUTPATIENT)
Age: 59
Discharge: HOME | End: 2024-07-19
Payer: COMMERCIAL

## 2024-07-19 DIAGNOSIS — Z12.5 SCREENING FOR PROSTATE CANCER: ICD-10-CM

## 2024-07-19 DIAGNOSIS — I10 PRIMARY HYPERTENSION: ICD-10-CM

## 2024-07-19 DIAGNOSIS — E11.65 TYPE 2 DIABETES MELLITUS WITH HYPERGLYCEMIA, WITHOUT LONG-TERM CURRENT USE OF INSULIN (HCC): ICD-10-CM

## 2024-07-19 DIAGNOSIS — E78.00 PURE HYPERCHOLESTEROLEMIA: ICD-10-CM

## 2024-07-19 DIAGNOSIS — E55.9 VITAMIN D DEFICIENCY: ICD-10-CM

## 2024-07-19 LAB
25(OH)D3 SERPL-MCNC: 43.8 NG/ML (ref 30–100)
ALBUMIN SERPL-MCNC: 4.2 G/DL (ref 3.5–5.2)
ALP SERPL-CCNC: 51 U/L (ref 40–129)
ALT SERPL-CCNC: 29 U/L (ref 0–40)
ANION GAP SERPL CALCULATED.3IONS-SCNC: 13 MMOL/L (ref 7–16)
AST SERPL-CCNC: 22 U/L (ref 0–39)
BASOPHILS # BLD: 0.07 K/UL (ref 0–0.2)
BASOPHILS NFR BLD: 1 % (ref 0–2)
BILIRUB SERPL-MCNC: 0.6 MG/DL (ref 0–1.2)
BUN SERPL-MCNC: 13 MG/DL (ref 6–20)
CALCIUM SERPL-MCNC: 9.5 MG/DL (ref 8.6–10.2)
CHLORIDE SERPL-SCNC: 98 MMOL/L (ref 98–107)
CHOLEST SERPL-MCNC: 151 MG/DL
CO2 SERPL-SCNC: 26 MMOL/L (ref 22–29)
CREAT SERPL-MCNC: 0.6 MG/DL (ref 0.7–1.2)
EOSINOPHIL # BLD: 0.34 K/UL (ref 0.05–0.5)
EOSINOPHILS RELATIVE PERCENT: 4 % (ref 0–6)
ERYTHROCYTE [DISTWIDTH] IN BLOOD BY AUTOMATED COUNT: 12.8 % (ref 11.5–15)
GFR, ESTIMATED: >90 ML/MIN/1.73M2
GLUCOSE SERPL-MCNC: 195 MG/DL (ref 74–99)
HCT VFR BLD AUTO: 45.1 % (ref 37–54)
HDLC SERPL-MCNC: 34 MG/DL
HGB BLD-MCNC: 16.1 G/DL (ref 12.5–16.5)
IMM GRANULOCYTES # BLD AUTO: 0.16 K/UL (ref 0–0.58)
IMM GRANULOCYTES NFR BLD: 2 % (ref 0–5)
LDLC SERPL CALC-MCNC: 73 MG/DL
LYMPHOCYTES NFR BLD: 1.85 K/UL (ref 1.5–4)
LYMPHOCYTES RELATIVE PERCENT: 23 % (ref 20–42)
MCH RBC QN AUTO: 33.1 PG (ref 26–35)
MCHC RBC AUTO-ENTMCNC: 35.7 G/DL (ref 32–34.5)
MCV RBC AUTO: 92.6 FL (ref 80–99.9)
MONOCYTES NFR BLD: 0.74 K/UL (ref 0.1–0.95)
MONOCYTES NFR BLD: 9 % (ref 2–12)
NEUTROPHILS NFR BLD: 60 % (ref 43–80)
NEUTS SEG NFR BLD: 4.81 K/UL (ref 1.8–7.3)
PLATELET # BLD AUTO: 274 K/UL (ref 130–450)
PMV BLD AUTO: 10.2 FL (ref 7–12)
POTASSIUM SERPL-SCNC: 4.2 MMOL/L (ref 3.5–5)
PROT SERPL-MCNC: 7.5 G/DL (ref 6.4–8.3)
PSA SERPL-MCNC: 0.37 NG/ML (ref 0–4)
RBC # BLD AUTO: 4.87 M/UL (ref 3.8–5.8)
SODIUM SERPL-SCNC: 137 MMOL/L (ref 132–146)
TRIGL SERPL-MCNC: 219 MG/DL
TSH SERPL DL<=0.05 MIU/L-ACNC: 3.2 UIU/ML (ref 0.27–4.2)
VLDLC SERPL CALC-MCNC: 44 MG/DL
WBC OTHER # BLD: 8 K/UL (ref 4.5–11.5)

## 2024-07-19 PROCEDURE — 82306 VITAMIN D 25 HYDROXY: CPT

## 2024-07-19 PROCEDURE — 84443 ASSAY THYROID STIM HORMONE: CPT

## 2024-07-19 PROCEDURE — 84681 ASSAY OF C-PEPTIDE: CPT

## 2024-07-19 PROCEDURE — 85025 COMPLETE CBC W/AUTO DIFF WBC: CPT

## 2024-07-19 PROCEDURE — 80061 LIPID PANEL: CPT

## 2024-07-19 PROCEDURE — 80053 COMPREHEN METABOLIC PANEL: CPT

## 2024-07-19 PROCEDURE — 36415 COLL VENOUS BLD VENIPUNCTURE: CPT

## 2024-07-19 PROCEDURE — G0103 PSA SCREENING: HCPCS

## 2024-07-23 LAB — C PEPTIDE SERPL-MCNC: 3.4 NG/ML (ref 1.1–4.4)

## 2024-07-25 ENCOUNTER — OFFICE VISIT (OUTPATIENT)
Dept: FAMILY MEDICINE CLINIC | Age: 59
End: 2024-07-25
Payer: COMMERCIAL

## 2024-07-25 VITALS
HEIGHT: 75 IN | WEIGHT: 274 LBS | OXYGEN SATURATION: 96 % | BODY MASS INDEX: 34.07 KG/M2 | TEMPERATURE: 97 F | SYSTOLIC BLOOD PRESSURE: 138 MMHG | HEART RATE: 120 BPM | RESPIRATION RATE: 20 BRPM | DIASTOLIC BLOOD PRESSURE: 86 MMHG

## 2024-07-25 DIAGNOSIS — I10 WHITE COAT SYNDROME WITH DIAGNOSIS OF HYPERTENSION: ICD-10-CM

## 2024-07-25 DIAGNOSIS — R68.82 LOW LIBIDO: ICD-10-CM

## 2024-07-25 DIAGNOSIS — Z23 NEED FOR PROPHYLACTIC VACCINATION AND INOCULATION AGAINST VARICELLA: ICD-10-CM

## 2024-07-25 DIAGNOSIS — E78.00 PURE HYPERCHOLESTEROLEMIA: ICD-10-CM

## 2024-07-25 DIAGNOSIS — I10 PRIMARY HYPERTENSION: ICD-10-CM

## 2024-07-25 DIAGNOSIS — E55.9 VITAMIN D DEFICIENCY: ICD-10-CM

## 2024-07-25 DIAGNOSIS — E66.09 CLASS 1 OBESITY DUE TO EXCESS CALORIES WITH SERIOUS COMORBIDITY AND BODY MASS INDEX (BMI) OF 34.0 TO 34.9 IN ADULT: ICD-10-CM

## 2024-07-25 DIAGNOSIS — Z23 NEED FOR COVID-19 VACCINE: ICD-10-CM

## 2024-07-25 DIAGNOSIS — E11.65 TYPE 2 DIABETES MELLITUS WITH HYPERGLYCEMIA, WITHOUT LONG-TERM CURRENT USE OF INSULIN (HCC): Primary | ICD-10-CM

## 2024-07-25 PROBLEM — R10.11 RIGHT UPPER QUADRANT ABDOMINAL PAIN: Status: RESOLVED | Noted: 2023-08-09 | Resolved: 2024-07-25

## 2024-07-25 PROBLEM — G89.4 CHRONIC PAIN SYNDROME: Status: RESOLVED | Noted: 2023-08-09 | Resolved: 2024-07-25

## 2024-07-25 LAB — HBA1C MFR BLD: 8.6 %

## 2024-07-25 PROCEDURE — 3052F HG A1C>EQUAL 8.0%<EQUAL 9.0%: CPT | Performed by: STUDENT IN AN ORGANIZED HEALTH CARE EDUCATION/TRAINING PROGRAM

## 2024-07-25 PROCEDURE — G2211 COMPLEX E/M VISIT ADD ON: HCPCS | Performed by: STUDENT IN AN ORGANIZED HEALTH CARE EDUCATION/TRAINING PROGRAM

## 2024-07-25 PROCEDURE — 3075F SYST BP GE 130 - 139MM HG: CPT | Performed by: STUDENT IN AN ORGANIZED HEALTH CARE EDUCATION/TRAINING PROGRAM

## 2024-07-25 PROCEDURE — 83036 HEMOGLOBIN GLYCOSYLATED A1C: CPT | Performed by: STUDENT IN AN ORGANIZED HEALTH CARE EDUCATION/TRAINING PROGRAM

## 2024-07-25 PROCEDURE — 99214 OFFICE O/P EST MOD 30 MIN: CPT | Performed by: STUDENT IN AN ORGANIZED HEALTH CARE EDUCATION/TRAINING PROGRAM

## 2024-07-25 PROCEDURE — 3079F DIAST BP 80-89 MM HG: CPT | Performed by: STUDENT IN AN ORGANIZED HEALTH CARE EDUCATION/TRAINING PROGRAM

## 2024-07-25 ASSESSMENT — ENCOUNTER SYMPTOMS
TROUBLE SWALLOWING: 0
ABDOMINAL PAIN: 0
SHORTNESS OF BREATH: 0

## 2024-07-25 NOTE — PATIENT INSTRUCTIONS
100 ounces of water a day - any juice or sugar continuing beverages are toxic to you. Track your heart rate.     No food after 7PM    Eat on a small 9 inch plate.     Get 7-8 hours of sleep at night     Add yet to sentences to allow growth mindset    Don't wait for anyone just do it     Tell yourself what you would tell your wife and daughter

## 2024-07-25 NOTE — PROGRESS NOTES
Josh Valdez Jr. (:  1965) is a 59 y.o. male, established patient follow up , here for evaluation of the following:  Diabetes (3 month follow up )      Assessment & Plan   ASSESSMENT/PLAN  Greater than 3 labs reviewed  [facility] patient care    1. Type 2 diabetes mellitus with hyperglycemia, without long-term current use of insulin (HCC)  Chronic, not well controlled, A1c again 8.6, was never able to get the Mounjaro, discussed importance of taking steps to change nutrition, increase exercise, lose 10% of his body weight, discussed C-peptide was not elevated when it should have been to likely have some pancreatic beta cell function problems as well discussed importance of changing nutrition to help body able to use what insulin does produce, if he cannot bring A1c down we will need to consider additional medications, did send in Mounjaro again hopefully this will get filled this time  -     POCT glycosylated hemoglobin (Hb A1C)  -     Tirzepatide (MOUNJARO) 2.5 MG/0.5ML SOPN SC injection; Inject 0.5 mLs into the skin once a week, Disp-4 each, R-0Normal  2. Pure hypercholesterolemia  Chronic, labs reviewed, triglycerides still high, continue current medication, including exercise to increase HDL  3. Primary hypertension  Chronic, borderline, same dietary recommendations, heart rate is high here in the office, he will go home and check to make sure it comes down, stay hydrated, denies symptoms of sleep apnea, declined sleep referral  4. Vitamin D deficiency  Chronic, labs reviewed, continue multivitamin  5. Class 1 obesity due to excess calories with serious comorbidity and body mass index (BMI) of 34.0 to 34.9 in adult  Chronic, not well-controlled, does have excuses, try to encourage him to take control, avoid blaming other people, he does know what to do, he needs to find the motivation  6. White coat syndrome with diagnosis of hypertension  7. Need for prophylactic vaccination and inoculation against

## 2024-08-19 DIAGNOSIS — E11.65 TYPE 2 DIABETES MELLITUS WITH HYPERGLYCEMIA, WITHOUT LONG-TERM CURRENT USE OF INSULIN (HCC): ICD-10-CM

## 2024-08-19 NOTE — TELEPHONE ENCOUNTER
Tirzepatide (MOUNJARO) 2.5 MG/0.5ML SOPN SC injection     Pt needs refill and wants to have [facility] if you have any questions please call [facility] [phone]

## 2024-09-09 DIAGNOSIS — E11.65 TYPE 2 DIABETES MELLITUS WITH HYPERGLYCEMIA, WITHOUT LONG-TERM CURRENT USE OF INSULIN (HCC): ICD-10-CM

## 2024-09-27 ENCOUNTER — TELEPHONE (OUTPATIENT)
Dept: FAMILY MEDICINE CLINIC | Age: 59
End: 2024-09-27

## 2024-09-27 NOTE — TELEPHONE ENCOUNTER
2 weeks in accident head slammed off the tub, in [place], he was fine, but now he is experiencing neck pain headaches, needs a CT and xray of left foot may have broken toes.

## 2024-09-30 ENCOUNTER — HOSPITAL ENCOUNTER (OUTPATIENT)
Dept: CT IMAGING | Age: 59
Discharge: HOME | End: 2024-10-02
Payer: COMMERCIAL

## 2024-09-30 ENCOUNTER — OFFICE VISIT (OUTPATIENT)
Dept: FAMILY MEDICINE CLINIC | Age: 59
End: 2024-09-30

## 2024-09-30 VITALS
HEART RATE: 85 BPM | WEIGHT: 273 LBS | DIASTOLIC BLOOD PRESSURE: 81 MMHG | BODY MASS INDEX: 33.94 KG/M2 | TEMPERATURE: 98.6 F | HEIGHT: 75 IN | OXYGEN SATURATION: 97 % | SYSTOLIC BLOOD PRESSURE: 143 MMHG

## 2024-09-30 DIAGNOSIS — S05.91XA RIGHT ORBIT TRAUMA, INITIAL ENCOUNTER: ICD-10-CM

## 2024-09-30 DIAGNOSIS — S09.90XA INJURY OF HEAD, INITIAL ENCOUNTER: ICD-10-CM

## 2024-09-30 DIAGNOSIS — R29.898 RIGHT ARM WEAKNESS: ICD-10-CM

## 2024-09-30 DIAGNOSIS — S09.90XA INJURY OF HEAD, INITIAL ENCOUNTER: Primary | ICD-10-CM
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
## 2024-09-30 DIAGNOSIS — M54.6 ACUTE LEFT-SIDED THORACIC BACK PAIN: ICD-10-CM

## 2024-09-30 DIAGNOSIS — M79.672 LEFT FOOT PAIN: ICD-10-CM

## 2024-09-30 DIAGNOSIS — M54.2 NECK PAIN: ICD-10-CM

## 2024-09-30 PROCEDURE — 70486 CT MAXILLOFACIAL W/O DYE: CPT

## 2024-09-30 PROCEDURE — 70450 CT HEAD/BRAIN W/O DYE: CPT

## 2024-09-30 RX ORDER — PREDNISONE 20 MG/1
40 TABLET ORAL DAILY
Qty: 10 TABLET | Refills: 0 | Status: SHIPPED | OUTPATIENT
Start: 2024-09-30 | End: 2024-10-05

## 2024-09-30 NOTE — PROGRESS NOTES
Josh Valdez Jr. (:  1965) is a 59 y.o. male, established patient follow up , here for evaluation of the following:  Head Injury (Fell in shower.  Headaches and head injury.  Foot injury )      Assessment & Plan   ASSESSMENT/PLAN  Patient here with head injury, continued symptoms of headache, will get stat imaging of head, facial bones due to tenderness around the right eye, he did fall into the side of the bathtub and hit his face and head on the right side, no bruising noted, he is neurologically intact, he also did hit his left foot which still continues to be painful, will also get x-ray of that, and lumbar spine due to pain in the mid back    All this imaging may come back normal, he was safe for outpatient treatment, did recommend 6 to 8 weeks to allow for recovery, likely suffered concussion    1. Injury of head, initial encounter  -     CT HEAD WO CONTRAST; Future  -     CT FACIAL BONES WO CONTRAST; Future  2. Fall, initial encounter  -     CT FACIAL BONES WO CONTRAST; Future  3. Left foot pain  -     XR FOOT LEFT (MIN 3 VIEWS); Future  4. Neck pain  -     XR CERVICAL SPINE (4-5 VIEWS); Future  5. Right arm weakness  6. Acute left-sided thoracic back pain  -     XR THORACOLUMBAR SPINE (MIN 2 VIEWS); Future  -     predniSONE (DELTASONE) 20 MG tablet; Take 2 tablets by mouth daily for 5 days, Disp-10 tablet, R-0Normal  7. Right orbit trauma, initial encounter  -     CT FACIAL BONES WO CONTRAST; Future    Return for has visit  .         Subjective   SUBJECTIVE/OBJECTIVE:  HPI  Josh is here for closed head injury, did not have immediate follow-up, He has left toe pain. They all turned purple eventually, He layed around on the boat so didn't notice, he has significant pain in toes now with the work shoes and regular life activity. 4th and 5th toes initially and the big toe, top of foot still painful,     He has right side head pain. He hit his ear flat on tub, no LOC, felt ok right away,

## 2024-10-01 DIAGNOSIS — I10 PRIMARY HYPERTENSION: ICD-10-CM

## 2024-10-01 DIAGNOSIS — E78.00 PURE HYPERCHOLESTEROLEMIA: ICD-10-CM

## 2024-10-01 RX ORDER — SIMVASTATIN 10 MG
TABLET ORAL
Qty: 90 TABLET | Refills: 3 | Status: SHIPPED | OUTPATIENT
Start: 2024-10-01

## 2024-10-01 RX ORDER — LOSARTAN POTASSIUM AND HYDROCHLOROTHIAZIDE 25; 100 MG/1; MG/1
1 TABLET ORAL DAILY
Qty: 90 TABLET | Refills: 3 | Status: SHIPPED | OUTPATIENT
Start: 2024-10-01

## 2024-10-01 RX ORDER — GLIPIZIDE 5 MG/1
5 TABLET ORAL
Qty: 180 TABLET | Refills: 3 | Status: SHIPPED | OUTPATIENT
Start: 2024-10-01

## 2024-10-01 NOTE — TELEPHONE ENCOUNTER
Last seen 9/30/2024  Next appt 10/31/2024    Requested Prescriptions     Pending Prescriptions Disp Refills    glipiZIDE (GLUCOTROL) 5 MG tablet 180 tablet 3     Sig: Take 1 tablet by mouth 2 times daily (before meals)    losartan-hydroCHLOROthiazide (HYZAAR) 100-25 MG per tablet 90 tablet 0     Sig: Take 1 tablet by mouth daily    simvastatin (ZOCOR) 10 MG tablet 90 tablet 0     Sig: TAKE 1 TABLET BY MOUTH ONE TIME A DAY

## 2024-10-31 PROBLEM — F17.200 SMOKING: Status: RESOLVED | Noted: 2022-08-03 | Resolved: 2024-10-31

## 2024-11-11 ENCOUNTER — TELEPHONE (OUTPATIENT)
Dept: FAMILY MEDICINE CLINIC | Age: 59
End: 2024-11-11

## 2024-11-11 DIAGNOSIS — E11.65 TYPE 2 DIABETES MELLITUS WITH HYPERGLYCEMIA, WITHOUT LONG-TERM CURRENT USE OF INSULIN (HCC): Primary | ICD-10-CM

## 2024-11-11 NOTE — TELEPHONE ENCOUNTER
Patient needs mounjaro refilled to [facility] pharmacy. It will not let me pend the medication    Mounjaro 5mg/0.5mL  inject 5mg under the skin weekly

## 2024-11-11 NOTE — PROGRESS NOTES
1. Type 2 diabetes mellitus with hyperglycemia, without long-term current use of insulin (HCC)  -     Tirzepatide 5 MG/0.5ML SOAJ; Inject 5 mg into the skin once a week, Disp-3 mL, R-0Normal

## 2024-12-03 ENCOUNTER — OFFICE VISIT (OUTPATIENT)
Dept: FAMILY MEDICINE CLINIC | Age: 59
End: 2024-12-03

## 2024-12-03 VITALS
HEART RATE: 112 BPM | BODY MASS INDEX: 34.32 KG/M2 | TEMPERATURE: 97.1 F | HEIGHT: 75 IN | OXYGEN SATURATION: 96 % | SYSTOLIC BLOOD PRESSURE: 138 MMHG | WEIGHT: 276 LBS | RESPIRATION RATE: 17 BRPM | DIASTOLIC BLOOD PRESSURE: 98 MMHG

## 2024-12-03 DIAGNOSIS — E11.65 TYPE 2 DIABETES MELLITUS WITH HYPERGLYCEMIA, WITHOUT LONG-TERM CURRENT USE OF INSULIN (HCC): Primary | ICD-10-CM

## 2024-12-03 DIAGNOSIS — I10 PRIMARY HYPERTENSION: ICD-10-CM

## 2024-12-03 DIAGNOSIS — E66.9 TYPE 2 DIABETES MELLITUS WITH OBESITY (HCC): ICD-10-CM

## 2024-12-03 DIAGNOSIS — E11.69 TYPE 2 DIABETES MELLITUS WITH OBESITY (HCC): ICD-10-CM

## 2024-12-03 LAB — HBA1C MFR BLD: 7.4 %

## 2024-12-03 RX ORDER — METFORMIN HYDROCHLORIDE 500 MG/1
1000 TABLET, EXTENDED RELEASE ORAL
Qty: 180 TABLET | Refills: 3
Start: 2024-12-03

## 2024-12-03 ASSESSMENT — ENCOUNTER SYMPTOMS
TROUBLE SWALLOWING: 0
ABDOMINAL PAIN: 0
SHORTNESS OF BREATH: 0

## 2024-12-03 NOTE — PROGRESS NOTES
tobacco. He reports current alcohol use of about 3.0 - 4.0 standard drinks of alcohol per week. He reports that he does not use drugs.  Family History: family history includes Alzheimer's Disease in his mother; Anxiety Disorder in his sister; No Known Problems in his father.  Allergies: Patient has no known allergies.  Medications:   Current Outpatient Medications   Medication Sig Dispense Refill    metFORMIN (GLUCOPHAGE-XR) 500 MG extended release tablet Take 2 tablets by mouth daily (with breakfast) 180 tablet 3    Tirzepatide 5 MG/0.5ML SOAJ Inject 5 mg into the skin once a week 3 mL 0    losartan-hydroCHLOROthiazide (HYZAAR) 100-25 MG per tablet Take 1 tablet by mouth daily 90 tablet 3    simvastatin (ZOCOR) 10 MG tablet TAKE 1 TABLET BY MOUTH ONE TIME A DAY 90 tablet 3    montelukast (SINGULAIR) 10 MG tablet Take 1 tablet by mouth nightly 90 tablet 3    Blood Glucose Monitoring Suppl (PRODIGY AUTOCODE BLOOD GLUCOSE) w/Device KIT       Prodigy Twist Top Lancets 28G MISC       blood glucose test strips (ASCENSIA AUTODISC VI;ONE TOUCH ULTRA TEST VI) strip 1 each by In Vitro route daily As needed. 100 each 3    FARXIGA 10 MG tablet TAKE ONE TABLET BY MOUTH EVERY MORNING 90 tablet 3    amLODIPine (NORVASC) 5 MG tablet Take 1 tablet by mouth at bedtime 90 tablet 3    Cholecalciferol (VITAMIN D3) 5000 UNITS TABS Take by mouth daily LD 7/25/19      KRILL OIL PO Take by mouth daily LD 7/25/19      aspirin 81 MG tablet Take 1 tablet by mouth daily LD 7/25/19 (takes for healthy living)      Multiple Vitamins-Minerals (CENTRUM PO) Take 1 tablet by mouth daily LD 7/25/19       No current facility-administered medications for this visit.      Allergies: Patient has no known allergies.     Review of Systems   Constitutional:  Negative for activity change, appetite change, fatigue, fever and unexpected weight change.   HENT:  Negative for trouble swallowing.    Eyes:  Negative for visual disturbance.   Respiratory:  Negative

## 2024-12-17 DIAGNOSIS — E11.65 TYPE 2 DIABETES MELLITUS WITH HYPERGLYCEMIA, WITHOUT LONG-TERM CURRENT USE OF INSULIN (HCC): ICD-10-CM

## 2024-12-17 NOTE — TELEPHONE ENCOUNTER
Name of Medication(s) Requested:  Requested Prescriptions     Pending Prescriptions Disp Refills    Tirzepatide 5 MG/0.5ML SOAJ 3 mL 0     Sig: Inject 5 mg into the skin once a week       Medication is on current medication list Yes    Dosage and directions were verified? Yes    Quantity verified: 30 day supply     Pharmacy Verified?  Yes    Last Appointment:  12/3/2024    Future appts:  Future Appointments   Date Time Provider Department Center   3/5/2025  9:00 AM Stephanie Rain MD [place] DE[facility]        (If no appt send self scheduling link. .REFILLAPPT)  Scheduling request sent?     [] Yes  [x] No    Does patient need updated?  [] Yes  [x] No

## 2024-12-20 DIAGNOSIS — E11.69 TYPE 2 DIABETES MELLITUS WITH OBESITY (HCC): ICD-10-CM

## 2024-12-20 DIAGNOSIS — E66.9 TYPE 2 DIABETES MELLITUS WITH OBESITY (HCC): ICD-10-CM

## 2024-12-20 NOTE — TELEPHONE ENCOUNTER
Name of Medication(s) Requested:  Requested Prescriptions     Pending Prescriptions Disp Refills    metFORMIN (GLUCOPHAGE-XR) 500 MG extended release tablet 180 tablet 3     Sig: Take 2 tablets by mouth daily (with breakfast)       Medication is on current medication list Yes    Dosage and directions were verified? Yes    Quantity verified: 90 day supply     Pharmacy Verified?  Yes    Last Appointment:  12/3/2024    Future appts:  Future Appointments   Date Time Provider Department Center   3/5/2025  9:00 AM Stephanie Rain MD [place] DE[facility]        (If no appt send self scheduling link. .REFILLAPPT)  Scheduling request sent?     [] Yes  [x] No    Does patient need updated?  [] Yes  [x] No

## 2024-12-21 RX ORDER — METFORMIN HYDROCHLORIDE 500 MG/1
1000 TABLET, EXTENDED RELEASE ORAL
Qty: 180 TABLET | Refills: 3 | Status: SHIPPED | OUTPATIENT
Start: 2024-12-21

## 2024-12-24 RX ORDER — LOSARTAN POTASSIUM AND HYDROCHLOROTHIAZIDE 25; 100 MG/1; MG/1
TABLET ORAL
Qty: 30 TABLET | Refills: 3 | OUTPATIENT
Start: 2024-12-24

## 2024-12-24 RX ORDER — MONTELUKAST SODIUM 10 MG/1
TABLET ORAL
Qty: 90 TABLET | Refills: 1 | OUTPATIENT
Start: 2024-12-24

## 2025-01-02 DIAGNOSIS — E11.65 TYPE 2 DIABETES MELLITUS WITH HYPERGLYCEMIA, WITHOUT LONG-TERM CURRENT USE OF INSULIN (HCC): ICD-10-CM

## 2025-01-02 NOTE — TELEPHONE ENCOUNTER
Name of Medication(s) Requested:  Requested Prescriptions     Pending Prescriptions Disp Refills    Tirzepatide 5 MG/0.5ML SOAJ 3 mL 0     Sig: Inject 5 mg into the skin once a week       Medication is on current medication list Yes    Dosage and directions were verified? Yes    Quantity verified: 30 day supply     Pharmacy Verified?  Yes    Last Appointment:  12/3/2024    Future appts:  Future Appointments   Date Time Provider Department Center   3/5/2025  9:00 AM Stephanie Rain MD [place]        (If no appt send self scheduling link. .REFILLAPPT)  Scheduling request sent?     [] Yes  [x] No    Does patient need updated?  [] Yes  [x] No

## 2025-02-11 DIAGNOSIS — E11.65 TYPE 2 DIABETES MELLITUS WITH HYPERGLYCEMIA, WITHOUT LONG-TERM CURRENT USE OF INSULIN (HCC): Primary | ICD-10-CM

## 2025-02-11 DIAGNOSIS — I10 PRIMARY HYPERTENSION: ICD-10-CM

## 2025-02-11 RX ORDER — DAPAGLIFLOZIN 10 MG/1
10 TABLET, FILM COATED ORAL EVERY MORNING
Qty: 90 TABLET | Refills: 3 | Status: SHIPPED | OUTPATIENT
Start: 2025-02-11

## 2025-02-11 RX ORDER — AMLODIPINE BESYLATE 5 MG/1
5 TABLET ORAL NIGHTLY
Qty: 90 TABLET | Refills: 3 | Status: SHIPPED | OUTPATIENT
Start: 2025-02-11

## 2025-02-11 NOTE — TELEPHONE ENCOUNTER
Name of Medication(s) Requested:  Requested Prescriptions     Pending Prescriptions Disp Refills    dapagliflozin (FARXIGA) 10 MG tablet 90 tablet 3     Sig: Take 1 tablet by mouth every morning    amLODIPine (NORVASC) 5 MG tablet 90 tablet 3     Sig: Take 1 tablet by mouth at bedtime       Medication is on current medication list Yes    Dosage and directions were verified? Yes    Quantity verified: 90 day supply     Pharmacy Verified?  Yes    Last Appointment:  12/3/2024    Future appts:  Future Appointments   Date Time Provider Department Center   3/5/2025  9:00 AM Stephanie Rain MD [place] DE[facility]        (If no appt send self scheduling link. .REFILLAPPT)  Scheduling request sent?     [] Yes  [x] No    Does patient need updated?  [] Yes  [x] No

## 2025-03-05 PROBLEM — E11.69 TYPE 2 DIABETES MELLITUS WITH OBESITY (HCC): Status: ACTIVE | Noted: 2025-03-05

## 2025-03-05 PROBLEM — E66.9 TYPE 2 DIABETES MELLITUS WITH OBESITY (HCC): Status: ACTIVE | Noted: 2025-03-05

## 2025-03-07 ENCOUNTER — OFFICE VISIT (OUTPATIENT)
Dept: FAMILY MEDICINE CLINIC | Age: 60
End: 2025-03-07
Payer: COMMERCIAL

## 2025-03-07 VITALS
OXYGEN SATURATION: 95 % | HEIGHT: 75 IN | RESPIRATION RATE: 17 BRPM | TEMPERATURE: 96.8 F | DIASTOLIC BLOOD PRESSURE: 84 MMHG | SYSTOLIC BLOOD PRESSURE: 138 MMHG | HEART RATE: 118 BPM | WEIGHT: 260.6 LBS | BODY MASS INDEX: 32.4 KG/M2

## 2025-03-07 DIAGNOSIS — E11.65 TYPE 2 DIABETES MELLITUS WITH HYPERGLYCEMIA, WITHOUT LONG-TERM CURRENT USE OF INSULIN (HCC): Primary | ICD-10-CM

## 2025-03-07 DIAGNOSIS — E11.69 TYPE 2 DIABETES MELLITUS WITH OBESITY (HCC): ICD-10-CM

## 2025-03-07 DIAGNOSIS — E66.9 TYPE 2 DIABETES MELLITUS WITH OBESITY (HCC): ICD-10-CM

## 2025-03-07 DIAGNOSIS — I10 PRIMARY HYPERTENSION: ICD-10-CM

## 2025-03-07 LAB — HBA1C MFR BLD: 8.5 %

## 2025-03-07 PROCEDURE — 3075F SYST BP GE 130 - 139MM HG: CPT | Performed by: STUDENT IN AN ORGANIZED HEALTH CARE EDUCATION/TRAINING PROGRAM

## 2025-03-07 PROCEDURE — G2211 COMPLEX E/M VISIT ADD ON: HCPCS | Performed by: STUDENT IN AN ORGANIZED HEALTH CARE EDUCATION/TRAINING PROGRAM

## 2025-03-07 PROCEDURE — 83036 HEMOGLOBIN GLYCOSYLATED A1C: CPT | Performed by: STUDENT IN AN ORGANIZED HEALTH CARE EDUCATION/TRAINING PROGRAM

## 2025-03-07 PROCEDURE — 3079F DIAST BP 80-89 MM HG: CPT | Performed by: STUDENT IN AN ORGANIZED HEALTH CARE EDUCATION/TRAINING PROGRAM

## 2025-03-07 PROCEDURE — 99214 OFFICE O/P EST MOD 30 MIN: CPT | Performed by: STUDENT IN AN ORGANIZED HEALTH CARE EDUCATION/TRAINING PROGRAM

## 2025-03-07 PROCEDURE — 3052F HG A1C>EQUAL 8.0%<EQUAL 9.0%: CPT | Performed by: STUDENT IN AN ORGANIZED HEALTH CARE EDUCATION/TRAINING PROGRAM

## 2025-03-07 SDOH — ECONOMIC STABILITY: FOOD INSECURITY: WITHIN THE PAST 12 MONTHS, YOU WORRIED THAT YOUR FOOD WOULD RUN OUT BEFORE YOU GOT MONEY TO BUY MORE.: NEVER TRUE

## 2025-03-07 SDOH — ECONOMIC STABILITY: FOOD INSECURITY: WITHIN THE PAST 12 MONTHS, THE FOOD YOU BOUGHT JUST DIDN'T LAST AND YOU DIDN'T HAVE MONEY TO GET MORE.: NEVER TRUE

## 2025-03-07 ASSESSMENT — PATIENT HEALTH QUESTIONNAIRE - PHQ9
SUM OF ALL RESPONSES TO PHQ QUESTIONS 1-9: 0
1. LITTLE INTEREST OR PLEASURE IN DOING THINGS: NOT AT ALL
2. FEELING DOWN, DEPRESSED OR HOPELESS: NOT AT ALL
SUM OF ALL RESPONSES TO PHQ QUESTIONS 1-9: 0

## 2025-03-07 ASSESSMENT — ENCOUNTER SYMPTOMS
ABDOMINAL PAIN: 0
TROUBLE SWALLOWING: 0
SHORTNESS OF BREATH: 0

## 2025-03-07 NOTE — PROGRESS NOTES
distress.     Appearance: Normal appearance.   HENT:      Head: Normocephalic and atraumatic.      Right Ear: External ear normal.      Left Ear: External ear normal.      Nose: Nose normal.      Mouth/Throat:      Mouth: Mucous membranes are moist.   Eyes:      Extraocular Movements: Extraocular movements intact.      Conjunctiva/sclera: Conjunctivae normal.   Cardiovascular:      Rate and Rhythm: Normal rate and regular rhythm.      Heart sounds: No murmur heard.  Pulmonary:      Effort: Pulmonary effort is normal.      Breath sounds: Normal breath sounds. No wheezing.   Musculoskeletal:         General: Normal range of motion.   Neurological:      General: No focal deficit present.      Mental Status: He is alert.   Psychiatric:         Mood and Affect: Mood normal.         Behavior: Behavior normal.             An electronic signature was used to authenticate this note.    --Stephanie Rain MD       *NOTE: This report was transcribed using voice recognition software. Every effort was made to ensure accuracy; however, inadvertent computerized transcription errors may be present.

## 2025-04-17 DIAGNOSIS — J30.2 SEASONAL ALLERGIES: ICD-10-CM

## 2025-04-17 NOTE — TELEPHONE ENCOUNTER
Name of Medication(s) Requested:    montelukast (SINGULAIR) 10 MG tablet        Medication is on current medication list Yes    Dosage and directions were verified? Yes    Quantity verified: 90 day supply     Pharmacy Verified?  Yes  [facility]   Last Appointment:  3/7/2025    Future appts:  Future Appointments   Date Time Provider Department Center   6/10/2025 11:00 AM Stephanie Rain MD [place] DE[facility]        (If no appt send self scheduling link. .REFILLAPPT)  Scheduling request sent?     [] Yes  [] No    Does patient need updated?  [] Yes  [] No

## 2025-04-18 RX ORDER — MONTELUKAST SODIUM 10 MG/1
10 TABLET ORAL NIGHTLY
Qty: 90 TABLET | Refills: 3 | Status: SHIPPED | OUTPATIENT
Start: 2025-04-18

## 2025-04-24 ENCOUNTER — OFFICE VISIT (OUTPATIENT)
Dept: PRIMARY CARE CLINIC | Age: 60
End: 2025-04-24
Payer: COMMERCIAL

## 2025-04-24 VITALS
WEIGHT: 254 LBS | SYSTOLIC BLOOD PRESSURE: 141 MMHG | TEMPERATURE: 98.9 F | HEIGHT: 75 IN | HEART RATE: 118 BPM | OXYGEN SATURATION: 96 % | DIASTOLIC BLOOD PRESSURE: 76 MMHG | BODY MASS INDEX: 31.58 KG/M2

## 2025-04-24 DIAGNOSIS — R05.9 COUGH IN ADULT PATIENT: ICD-10-CM

## 2025-04-24 DIAGNOSIS — J06.9 ACUTE URI: Primary | ICD-10-CM

## 2025-04-24 LAB
INFLUENZA A ANTIGEN, POC: NEGATIVE
INFLUENZA B ANTIGEN, POC: NEGATIVE
Lab: NORMAL
PERFORMING INSTRUMENT: NORMAL
QC PASS/FAIL: NORMAL
SARS-COV-2, POC: NORMAL

## 2025-04-24 PROCEDURE — 87804 INFLUENZA ASSAY W/OPTIC: CPT | Performed by: NURSE PRACTITIONER

## 2025-04-24 PROCEDURE — 99213 OFFICE O/P EST LOW 20 MIN: CPT | Performed by: NURSE PRACTITIONER

## 2025-04-24 PROCEDURE — 3077F SYST BP >= 140 MM HG: CPT | Performed by: NURSE PRACTITIONER

## 2025-04-24 PROCEDURE — 87426 SARSCOV CORONAVIRUS AG IA: CPT | Performed by: NURSE PRACTITIONER

## 2025-04-24 PROCEDURE — 3078F DIAST BP <80 MM HG: CPT | Performed by: NURSE PRACTITIONER

## 2025-04-24 RX ORDER — AZITHROMYCIN 250 MG/1
TABLET, FILM COATED ORAL
Qty: 6 TABLET | Refills: 0 | Status: SHIPPED | OUTPATIENT
Start: 2025-04-24 | End: 2025-05-04

## 2025-04-24 RX ORDER — DEXTROMETHORPHAN HYDROBROMIDE AND PROMETHAZINE HYDROCHLORIDE 15; 6.25 MG/5ML; MG/5ML
5 SYRUP ORAL 4 TIMES DAILY PRN
Qty: 180 ML | Refills: 0 | Status: SHIPPED | OUTPATIENT
Start: 2025-04-24 | End: 2025-05-01

## 2025-04-24 NOTE — PROGRESS NOTES
Chief Complaint:   Fever, Cough (Non productive cough for 2 days), and Chills      History of Present Illness   Source of history provided by:  patient.      Josh Valdez Jr. is a 60 y.o. old male with a past medical history of:   Past Medical History:   Diagnosis Date    Diabetes mellitus (HCC)     Encounter for screening colonoscopy     Hyperlipemia     Hypertension     Smoking 08/03/2022       Pt  [facility] with a cough/fevers/chills/congestion for the past 2 days.  States the cough is non productive.  No current fever noted.   Denies any N/V/D, abdominal pain, CP, progressive SOB, dizziness, or lethargy.     ROS    Unless otherwise stated in this report or unable to obtain because of the patient's clinical or mental status as evidenced by the medical record, this patients's positive and negative responses for Review of Systems, constitutional, psych, eyes, ENT, cardiovascular, respiratory, gastrointestinal, neurological, genitourinary, musculoskeletal, integument systems and systems related to the presenting problem are either stated in the preceding or were not pertinent or were negative for the symptoms and/or complaints related to the medical problem.    Past Surgical History:  has a past surgical history that includes Tonsillectomy; Upper gastrointestinal endoscopy (09/20/12); sinus surgery; and Colonoscopy (N/A, 7/31/2019).  Social History:  reports that he has never smoked. He has been exposed to tobacco smoke. He has never used smokeless tobacco. He reports current alcohol use of about 3.0 - 4.0 standard drinks of alcohol per week. He reports that he does not use drugs.  Family History: family history includes Alzheimer's Disease in his mother; Anxiety Disorder in his sister; No Known Problems in his father.   Allergies: Patient has no known allergies.    Physical Exam         VS:  BP (!) 141/76 (BP Site: Right Upper Arm)   Pulse (!) 118   Temp 98.9 °F (37.2 °C)   Ht 1.905 m (6' 3\")

## 2025-07-07 DIAGNOSIS — E11.65 TYPE 2 DIABETES MELLITUS WITH HYPERGLYCEMIA, WITHOUT LONG-TERM CURRENT USE OF INSULIN (HCC): ICD-10-CM

## 2025-07-07 LAB — DIABETIC RETINOPATHY: NEGATIVE

## 2025-07-09 ENCOUNTER — HOSPITAL ENCOUNTER (OUTPATIENT)
Age: 60
Discharge: HOME | End: 2025-07-09
Payer: COMMERCIAL

## 2025-07-09 LAB — TESTOST SERPL-MCNC: 262 NG/DL (ref 193–740)

## 2025-07-09 PROCEDURE — 84403 ASSAY OF TOTAL TESTOSTERONE: CPT

## 2025-07-09 PROCEDURE — 36415 COLL VENOUS BLD VENIPUNCTURE: CPT

## 2025-07-14 ENCOUNTER — RESULTS FOLLOW-UP (OUTPATIENT)
Dept: FAMILY MEDICINE CLINIC | Age: 60
End: 2025-07-14

## 2025-07-14 DIAGNOSIS — R39.9 LOWER URINARY TRACT SYMPTOMS (LUTS): ICD-10-CM

## 2025-07-14 DIAGNOSIS — R68.82 LOW LIBIDO: Primary | ICD-10-CM

## 2025-07-14 NOTE — TELEPHONE ENCOUNTER
1. Low libido  -     Josh Gray MD, Urology, [place]  2. Lower urinary tract symptoms (LUTS)  -     Josh Gray MD, Urology, [place]

## 2025-08-16 DIAGNOSIS — E11.65 TYPE 2 DIABETES MELLITUS WITH HYPERGLYCEMIA, WITHOUT LONG-TERM CURRENT USE OF INSULIN (HCC): ICD-10-CM

## 2025-08-18 RX ORDER — TIRZEPATIDE 5 MG/.5ML
INJECTION, SOLUTION SUBCUTANEOUS
Qty: 3 ML | Refills: 1 | Status: SHIPPED | OUTPATIENT
Start: 2025-08-18

## (undated) DEVICE — GRADUATE TRIANG MEASURE 1000ML BLK PRNT